# Patient Record
Sex: FEMALE | Race: WHITE | Employment: UNEMPLOYED | URBAN - METROPOLITAN AREA
[De-identification: names, ages, dates, MRNs, and addresses within clinical notes are randomized per-mention and may not be internally consistent; named-entity substitution may affect disease eponyms.]

---

## 2017-01-11 ENCOUNTER — HOSPITAL ENCOUNTER (OUTPATIENT)
Dept: NUCLEAR MEDICINE | Age: 37
Discharge: HOME | End: 2017-01-11
Attending: EMERGENCY MEDICINE | Admitting: EMERGENCY MEDICINE

## 2017-01-11 DIAGNOSIS — M79.604 PAIN OF RIGHT LEG: ICD-10-CM

## 2017-01-11 DIAGNOSIS — M84.376G: ICD-10-CM

## 2017-01-11 RX ORDER — TC 99M MEDRONATE 20 MG/10ML
25 INJECTION, POWDER, LYOPHILIZED, FOR SOLUTION INTRAVENOUS
Status: DISCONTINUED | OUTPATIENT
Start: 2017-01-11 | End: 2017-01-12 | Stop reason: HOSPADM

## 2018-02-09 ENCOUNTER — HOSPITAL ENCOUNTER (OUTPATIENT)
Dept: NUCLEAR MEDICINE | Age: 38
Discharge: HOME | End: 2018-02-09
Admitting: EMERGENCY MEDICINE

## 2018-02-09 DIAGNOSIS — M25.50 ARTHRALGIA, UNSPECIFIED JOINT: ICD-10-CM

## 2018-02-09 DIAGNOSIS — M79.601 PAIN OF RIGHT ARM: ICD-10-CM

## 2018-02-09 RX ORDER — TC 99M MEDRONATE 20 MG/10ML
25 INJECTION, POWDER, LYOPHILIZED, FOR SOLUTION INTRAVENOUS
Status: COMPLETED | OUTPATIENT
Start: 2018-02-09 | End: 2018-02-09

## 2018-02-09 RX ADMIN — TC 99M MEDRONATE 25 MILLICURIE: 20 INJECTION, POWDER, LYOPHILIZED, FOR SOLUTION INTRAVENOUS at 09:00

## 2018-05-10 ENCOUNTER — HOSPITAL ENCOUNTER (OUTPATIENT)
Dept: NEUROLOGY | Age: 38
Discharge: HOME | End: 2018-05-10
Attending: INTERNAL MEDICINE | Admitting: INTERNAL MEDICINE

## 2018-05-10 DIAGNOSIS — G90.09 OTHER IDIOPATHIC PERIPHERAL AUTONOMIC NEUROPATHY: ICD-10-CM

## 2018-09-12 ENCOUNTER — TELEPHONE (OUTPATIENT)
Age: 38
End: 2018-09-12

## 2018-09-13 NOTE — TELEPHONE ENCOUNTER
I have talked with Dr. Norm Doran but don't remember the details of the conversation. If he wants her seen here, we should get a formal referral from his office.  Suggest she call his office to request a referral.

## 2018-09-13 NOTE — TELEPHONE ENCOUNTER
Spoke with the patient related to New patient appointment.  Per Dr. Meka Mendenhall, please have Dr. Leny Jimenez send over a referral. Patient acknowledged

## 2018-09-25 PROBLEM — Z94.0 KIDNEY REPLACED BY TRANSPLANT: Status: ACTIVE | Noted: 2018-09-25

## 2018-09-25 RX ORDER — SODIUM CHLORIDE 0.9 % (FLUSH) 0.9 %
10 SYRINGE (ML) INJECTION PRN
Status: CANCELLED
Start: 2018-09-26

## 2018-09-26 ENCOUNTER — HOSPITAL ENCOUNTER (OUTPATIENT)
Dept: INFUSION THERAPY | Age: 38
Setting detail: INFUSION SERIES
Discharge: HOME | End: 2018-09-26
Payer: MEDICARE

## 2018-09-26 VITALS
RESPIRATION RATE: 17 BRPM | TEMPERATURE: 98.7 F | DIASTOLIC BLOOD PRESSURE: 74 MMHG | BODY MASS INDEX: 37.71 KG/M2 | WEIGHT: 220.9 LBS | HEART RATE: 81 BPM | OXYGEN SATURATION: 99 % | HEIGHT: 64 IN | SYSTOLIC BLOOD PRESSURE: 120 MMHG

## 2018-09-26 DIAGNOSIS — Z94.0 KIDNEY REPLACED BY TRANSPLANT: ICD-10-CM

## 2018-09-26 PROCEDURE — 2580000003 HC RX 258: Performed by: INTERNAL MEDICINE

## 2018-09-26 PROCEDURE — 96365 THER/PROPH/DIAG IV INF INIT: CPT

## 2018-09-26 PROCEDURE — 6360000002 HC RX W HCPCS: Performed by: INTERNAL MEDICINE

## 2018-09-26 RX ORDER — LORATADINE 10 MG/1
10 TABLET ORAL DAILY
COMMUNITY
End: 2019-06-11

## 2018-09-26 RX ORDER — SODIUM CHLORIDE 0.9 % (FLUSH) 0.9 %
10 SYRINGE (ML) INJECTION PRN
Status: CANCELLED
Start: 2018-09-26

## 2018-09-26 RX ORDER — MYCOPHENOLATE MOFETIL 250 MG/1
250 CAPSULE ORAL 2 TIMES DAILY
COMMUNITY

## 2018-09-26 RX ORDER — CITALOPRAM 20 MG/1
20 TABLET ORAL EVERY EVENING
COMMUNITY
End: 2020-05-21 | Stop reason: ALTCHOICE

## 2018-09-26 RX ADMIN — DEXTROSE MONOHYDRATE 500 MG: 50 INJECTION, SOLUTION INTRAVENOUS at 15:02

## 2018-09-26 NOTE — PROGRESS NOTES
Outpatient [id number] [facility]    Belatacept Visit    NAME:  Junior Matos OF BIRTH:  1980  MEDICAL RECORD NUMBER:  [id number]  DATE:  9/26/2018    Patient arrived to [facility] 58   [] per wheelchair   [x] ambulatory       Diagnosis: kidney transplant/ was getting this infusion at [facility].   Patient Active Problem List   Diagnosis    Kidney replaced by transplant     Allergies   Allergen Reactions    Dilaudid [Hydromorphone Hcl]     Morphine     Zithromax [Azithromycin]      Patient needs to be seropositive to EBV prior to Belatacept Infusion. Patient is EBV seropositive? Unable to find in documentation. Has patient had TB skin test recently? No / unknown per pt. Has patient had any vaccines given recently?  no  Has patient been instructed to avoid any live vaccine administrations, such as but not limited to : intranasal influenza, measles, mumps, rubella, oral polio, BCG, yellow fever, varicella, and TY21 typhoid vaccines? yes  Patient is aware that Belatacept is an immunosuppressant therapy and can increase risk of developing  infections and possibly other  Malignancies? Yes    checklist       Patient given a copy of the medication guide:  No:        Patient counseled about increased risk for:    Post-Transplant Lymphoproliferative Disorder (PTLD), predominantly involving the  CNS:  Yes                                                  Increased risk of Progressive Multifocal Leukoencephalopathy (PML), a CNS Infection:  Yes                                                                                                 Prior to infusion, these questions were read aloud to patient:  Over the past month, have you had any new or worsening medical problems such as a new or sudden change in your thinking, memory, speech, mood, behavior, vision, balance, strength, or other problems?   No                      Over the past month, have you had any new or worsening symptoms such as fever, night sweats, tiredness that does not go away, weight loss or swollen glands? YES nightly with 3 fans. Patient reminded to immediately report any new or worsening medical problems such as:  A new or sudden change in thinking, memory, speech, mood, behavior, vision, balance,strength. Yes                                Fever, night sweats, tiredness that does not go away, weight loss, or swollen glands. Yes   Night sweats that the MD is aware of. Patient given Belatacept  mg IVPB over 30 min IVPB using a .22 micron filter per protocol. Response to treatment:  Well tolerated by patient. Education:    Indicates understanding  To return 10/23/18 at 1415.  hemodiaylsis cath in left ACF site clean and unremarkable. Not in use at this time.   Electronically signed by Good Mariscal RN on 9/26/2018 at 3:57 PM

## 2018-10-23 ENCOUNTER — HOSPITAL ENCOUNTER (OUTPATIENT)
Dept: INFUSION THERAPY | Age: 38
Setting detail: INFUSION SERIES
Discharge: HOME | End: 2018-10-23
Payer: MEDICARE

## 2018-10-23 VITALS
DIASTOLIC BLOOD PRESSURE: 99 MMHG | TEMPERATURE: 98.9 F | HEART RATE: 77 BPM | RESPIRATION RATE: 17 BRPM | SYSTOLIC BLOOD PRESSURE: 144 MMHG | BODY MASS INDEX: 37.08 KG/M2 | WEIGHT: 216 LBS

## 2018-10-23 DIAGNOSIS — Z94.0 KIDNEY REPLACED BY TRANSPLANT: ICD-10-CM

## 2018-10-23 PROCEDURE — 6360000002 HC RX W HCPCS: Performed by: INTERNAL MEDICINE

## 2018-10-23 PROCEDURE — 2580000003 HC RX 258: Performed by: INTERNAL MEDICINE

## 2018-10-23 PROCEDURE — 96365 THER/PROPH/DIAG IV INF INIT: CPT

## 2018-10-23 RX ORDER — ACETAMINOPHEN 325 MG/1
650 TABLET ORAL EVERY 6 HOURS PRN
COMMUNITY

## 2018-10-23 RX ORDER — SODIUM CHLORIDE 0.9 % (FLUSH) 0.9 %
10 SYRINGE (ML) INJECTION PRN
Status: DISCONTINUED | OUTPATIENT
Start: 2018-10-23 | End: 2018-10-24 | Stop reason: HOSPADM

## 2018-10-23 RX ORDER — SODIUM CHLORIDE 0.9 % (FLUSH) 0.9 %
10 SYRINGE (ML) INJECTION PRN
Status: CANCELLED
Start: 2018-10-24

## 2018-10-23 RX ADMIN — Medication 20 ML: at 14:17

## 2018-10-23 RX ADMIN — DEXTROSE MONOHYDRATE 500 MG: 50 INJECTION, SOLUTION INTRAVENOUS at 15:29

## 2018-10-23 NOTE — PROGRESS NOTES
Outpatient [id number] [facility]    Belatacept Visit    NAME:  Ellen Ridley OF BIRTH:  1980  MEDICAL RECORD NUMBER:  [id number]  DATE:  10/23/2018    Patient arrived to [facility] 58   [] per wheelchair   [x] ambulatory       Diagnosis: kidney transplant    Patient Active Problem List   Diagnosis    Kidney replaced by transplant     Allergies   Allergen Reactions    Dilaudid [Hydromorphone Hcl]     Morphine     Zithromax [Azithromycin]        Patient needs to be seropositive to EBV prior to Belatacept Infusion. Patient is EBV seropositive? Yes    Has patient had TB skin test recently? July 2016  Has patient had any vaccines given recently? Yes July 2016    Has patient been instructed to avoid any live vaccine administrations, such as but not limited to : intranasal influenza, measles, mumps, rubella, oral polio, BCG, yellow fever, varicella, and TY21 typhoid vaccines? Yes    Patient is aware that Belatacept is an immunosuppressant therapy and can increase risk of developing  infections and possibly other  Malignancies? Yes     Wt 216 lb (98 kg)   BMI 37.08 kg/m²     Pre Infusion Checklist       Patient given a copy of the medication guide:  Yes       Patient counseled about increased risk for:    Post-Transplant Lymphoproliferative Disorder (PTLD), predominantly involving             the  CNS:  yes                                                  Increased risk of Progressive Multifocal Leukoencephalopathy (PML), a CNS                    Infection:  yes                                                                                                   Prior to infusion, these questions were read aloud to patient:  Over the past month, have you had any new or worsening medical problems such as a new or sudden change in your thinking, memory, speech, mood, behavior, vision, balance, strength, or other problems? NO.                          Over the past month, have you had any new or worsening symptoms such as fever, night sweats, tiredness that does not go away, weight loss or swollen glands? NO. Patient reminded to immediately report any new or worsening medical problems such as:  A new or sudden change in thinking, memory, speech, mood, behavior, vision, balance,strength. yes                                 Fever, night sweats, tiredness that does not go away, weight loss, or swollen glands. yes                                                          Patient given Belatacept 500 mg IVPB over 30 min IVPB using a .22 micron filter per protocol. Response to treatment:  Well tolerated by patient.     Education:    Indicates understanding    Electronically signed by Shahriar Louise RN on 10/23/2018 at 6:17 PM

## 2018-10-25 ENCOUNTER — HOSPITAL ENCOUNTER (OUTPATIENT)
Dept: NUCLEAR MEDICINE | Age: 38
Discharge: HOME | End: 2018-10-25
Payer: MEDICARE

## 2018-10-25 DIAGNOSIS — M89.8X6 PAIN IN LEFT TIBIA: ICD-10-CM

## 2018-10-25 DIAGNOSIS — M79.671 PAIN OF RIGHT HEEL: ICD-10-CM

## 2018-10-25 PROCEDURE — A9503 TC99M MEDRONATE: HCPCS | Performed by: EMERGENCY MEDICINE

## 2018-10-25 PROCEDURE — 3430000000 HC RX DIAGNOSTIC RADIOPHARMACEUTICAL: Performed by: EMERGENCY MEDICINE

## 2018-10-25 PROCEDURE — 78315 BONE IMAGING 3 PHASE: CPT

## 2018-10-25 RX ORDER — TC 99M MEDRONATE 20 MG/10ML
25 INJECTION, POWDER, LYOPHILIZED, FOR SOLUTION INTRAVENOUS
Status: COMPLETED | OUTPATIENT
Start: 2018-10-25 | End: 2018-10-25

## 2018-10-25 RX ADMIN — Medication 25 MILLICURIE: at 09:18

## 2018-11-01 ENCOUNTER — TELEPHONE (OUTPATIENT)
Dept: ENDOCRINOLOGY | Age: 38
End: 2018-11-01

## 2018-11-02 NOTE — TELEPHONE ENCOUNTER
See telephone encounter from 09/12/2018. As far as I know, we have not received a referral from Dr. Rosalie Flores. You can go ahead and send her the Health History Form, but have her call Dr. Angelic Michel office to have them do the referral. Thanks.

## 2018-11-16 ENCOUNTER — TELEPHONE (OUTPATIENT)
Dept: ENDOCRINOLOGY | Age: 38
End: 2018-11-16

## 2018-11-20 ENCOUNTER — HOSPITAL ENCOUNTER (OUTPATIENT)
Dept: INFUSION THERAPY | Age: 38
Setting detail: INFUSION SERIES
Discharge: HOME | End: 2018-11-20
Payer: MEDICARE

## 2018-11-20 VITALS
TEMPERATURE: 98.8 F | OXYGEN SATURATION: 98 % | HEART RATE: 77 BPM | SYSTOLIC BLOOD PRESSURE: 119 MMHG | DIASTOLIC BLOOD PRESSURE: 82 MMHG | RESPIRATION RATE: 16 BRPM

## 2018-11-20 DIAGNOSIS — Z94.0 KIDNEY REPLACED BY TRANSPLANT: ICD-10-CM

## 2018-11-20 PROCEDURE — 6360000002 HC RX W HCPCS: Performed by: INTERNAL MEDICINE

## 2018-11-20 PROCEDURE — 96372 THER/PROPH/DIAG INJ SC/IM: CPT

## 2018-11-20 PROCEDURE — 2580000003 HC RX 258: Performed by: INTERNAL MEDICINE

## 2018-11-20 RX ORDER — SODIUM CHLORIDE 0.9 % (FLUSH) 0.9 %
10 SYRINGE (ML) INJECTION PRN
Status: CANCELLED
Start: 2018-11-21

## 2018-11-20 RX ORDER — SODIUM CHLORIDE 0.9 % (FLUSH) 0.9 %
10 SYRINGE (ML) INJECTION PRN
Status: DISCONTINUED | OUTPATIENT
Start: 2018-11-20 | End: 2018-11-21 | Stop reason: HOSPADM

## 2018-11-20 RX ADMIN — DEXTROSE MONOHYDRATE 500 MG: 50 INJECTION, SOLUTION INTRAVENOUS at 16:04

## 2018-11-20 NOTE — PROGRESS NOTES
Outpatient [id number] [facility]    Belatacept Visit    NAME:  Landy Tamayo OF BIRTH:  1980  MEDICAL RECORD NUMBER:  [id number]  DATE:  11/20/2018    Patient arrived to [facility] 58   [] per wheelchair   [x] ambulatory     Alert and oriented X4, denies any side effects from last infusion. C/O intermittent bone pain related to chronic disease, last visit in boot states xrays determined tibial fx bilaterally, boot discontinued, no further treatment needed. Diagnosis: kidney transplant    Patient Active Problem List   Diagnosis    Kidney replaced by transplant     Allergies   Allergen Reactions    Dilaudid [Hydromorphone Hcl]     Morphine     Zithromax [Azithromycin]        Patient needs to be seropositive to EBV prior to Belatacept Infusion. Patient is EBV seropositive? Yes    Has patient had TB skin test recently? July 2016    Has patient had any vaccines given recently? No    Has patient been instructed to avoid any live vaccine administrations, such as but not limited to : intranasal influenza, measles, mumps, rubella, oral polio, BCG, yellow fever, varicella, and TY21 typhoid vaccines? Yes    Patient is aware that Belatacept is an immunosuppressant therapy and can increase risk of developing  infections and possibly other  Malignancies?   Yes     /82   Pulse 77   Temp 98.8 °F (37.1 °C) (Oral)   Resp 16   SpO2 98%     Pre Infusion Checklist       Patient given a copy of the medication guide:  Yes       Patient counseled about increased risk for:    Post-Transplant Lymphoproliferative Disorder (PTLD), predominantly involving             the  CNS:  Yes                                                  Increased risk of Progressive Multifocal Leukoencephalopathy (PML), a CNS                    Infection:  Yes                                                                                                   Prior to infusion, these questions were read aloud to patient:  Over the past month, have you had any new or worsening medical problems such as a new or sudden change in your thinking, memory, speech, mood, behavior, vision, balance, strength, or other problems? No                         Over the past month, have you had any new or worsening symptoms such as fever, night sweats, tiredness that does not go away, weight loss or swollen glands? No                   Patient reminded to immediately report any new or worsening medical problems such as:  A new or sudden change in thinking, memory, speech, mood, behavior, vision, balance,strength. Yes                                Fever, night sweats, tiredness that does not go away, weight loss, or swollen glands. Yes                                                             Patient given Belatacept 500 mg IVPB over 30 min IVPB using a .22 micron filter per protocol. Response to treatment:  Well tolerated by patient.     Education:    Verbalized understanding       Electronically signed by Louisa Yang RN on 11/20/2018 at 3:09 PM

## 2018-11-27 NOTE — TELEPHONE ENCOUNTER
Left message for the patient to call the office related to  Schedule new patient appointment in December 2018

## 2018-11-28 PROBLEM — E83.52 HYPERCALCEMIA: Status: ACTIVE | Noted: 2018-11-28

## 2018-11-28 PROBLEM — R79.89 HIGH SERUM PARATHYROID HORMONE (PTH): Status: ACTIVE | Noted: 2018-11-28

## 2018-12-03 ENCOUNTER — OFFICE VISIT (OUTPATIENT)
Dept: ENDOCRINOLOGY | Age: 38
End: 2018-12-03
Payer: MEDICARE

## 2018-12-03 VITALS
HEIGHT: 63 IN | WEIGHT: 223 LBS | SYSTOLIC BLOOD PRESSURE: 122 MMHG | DIASTOLIC BLOOD PRESSURE: 73 MMHG | BODY MASS INDEX: 39.51 KG/M2

## 2018-12-03 DIAGNOSIS — M84.471A: ICD-10-CM

## 2018-12-03 DIAGNOSIS — Z94.0 KIDNEY REPLACED BY TRANSPLANT: Primary | ICD-10-CM

## 2018-12-03 DIAGNOSIS — M84.373D: ICD-10-CM

## 2018-12-03 DIAGNOSIS — E83.52 HYPERCALCEMIA: ICD-10-CM

## 2018-12-03 DIAGNOSIS — R79.89 HIGH SERUM PARATHYROID HORMONE (PTH): ICD-10-CM

## 2018-12-03 DIAGNOSIS — S92.901D FOOT FRACTURE, RIGHT, WITH ROUTINE HEALING, SUBSEQUENT ENCOUNTER: ICD-10-CM

## 2018-12-03 PROCEDURE — 1036F TOBACCO NON-USER: CPT | Performed by: INTERNAL MEDICINE

## 2018-12-03 PROCEDURE — G8417 CALC BMI ABV UP PARAM F/U: HCPCS | Performed by: INTERNAL MEDICINE

## 2018-12-03 PROCEDURE — 99205 OFFICE O/P NEW HI 60 MIN: CPT | Performed by: INTERNAL MEDICINE

## 2018-12-03 PROCEDURE — G8484 FLU IMMUNIZE NO ADMIN: HCPCS | Performed by: INTERNAL MEDICINE

## 2018-12-03 PROCEDURE — G8427 DOCREV CUR MEDS BY ELIG CLIN: HCPCS | Performed by: INTERNAL MEDICINE

## 2018-12-13 LAB
ALBUMIN SERPL-MCNC: 4.5 G/DL (ref 3.5–5)
CALCIUM SERPL-MCNC: 9.7 MG/DL (ref 8.4–10.2)
PHOSPHORUS: 2.4 MG/DL (ref 2.7–4.5)
VITAMIN D 25-HYDROXY: 34 NG/ML

## 2018-12-14 LAB — PARATHYROID HORMONE INTACT: 72.41 PG/ML (ref 15–65)

## 2018-12-16 LAB — ALK PHOS BONE SPECIFIC: 13.3 UG/L

## 2018-12-18 ENCOUNTER — APPOINTMENT (OUTPATIENT)
Dept: INFUSION THERAPY | Age: 38
End: 2018-12-18
Payer: MEDICARE

## 2018-12-18 LAB — COLLAGEN CROSSLINKED C-TELOPEPTIDE: 248 PG/ML (ref 60–650)

## 2018-12-19 ENCOUNTER — HOSPITAL ENCOUNTER (OUTPATIENT)
Dept: INFUSION THERAPY | Age: 38
Setting detail: INFUSION SERIES
Discharge: HOME | End: 2018-12-19
Payer: MEDICARE

## 2018-12-19 VITALS
DIASTOLIC BLOOD PRESSURE: 87 MMHG | SYSTOLIC BLOOD PRESSURE: 135 MMHG | OXYGEN SATURATION: 100 % | TEMPERATURE: 98.2 F | HEART RATE: 73 BPM | RESPIRATION RATE: 16 BRPM

## 2018-12-19 DIAGNOSIS — Z94.0 KIDNEY REPLACED BY TRANSPLANT: ICD-10-CM

## 2018-12-19 DIAGNOSIS — E83.39 HYPOPHOSPHATEMIA: Primary | ICD-10-CM

## 2018-12-19 DIAGNOSIS — R79.89 HIGH SERUM PARATHYROID HORMONE (PTH): ICD-10-CM

## 2018-12-19 PROCEDURE — 6360000002 HC RX W HCPCS: Performed by: INTERNAL MEDICINE

## 2018-12-19 PROCEDURE — 2580000003 HC RX 258: Performed by: INTERNAL MEDICINE

## 2018-12-19 PROCEDURE — 96365 THER/PROPH/DIAG IV INF INIT: CPT

## 2018-12-19 RX ORDER — SODIUM CHLORIDE 0.9 % (FLUSH) 0.9 %
10 SYRINGE (ML) INJECTION PRN
Status: DISCONTINUED | OUTPATIENT
Start: 2018-12-19 | End: 2018-12-20 | Stop reason: HOSPADM

## 2018-12-19 RX ORDER — SODIUM CHLORIDE 0.9 % (FLUSH) 0.9 %
10 SYRINGE (ML) INJECTION PRN
Status: CANCELLED
Start: 2018-12-19

## 2018-12-19 RX ORDER — ATORVASTATIN CALCIUM 20 MG/1
20 TABLET, FILM COATED ORAL DAILY
COMMUNITY
End: 2019-02-15 | Stop reason: ALTCHOICE

## 2018-12-19 RX ADMIN — DEXTROSE MONOHYDRATE 500 MG: 50 INJECTION, SOLUTION INTRAVENOUS at 12:42

## 2018-12-19 RX ADMIN — Medication 10 ML: at 11:03

## 2018-12-19 RX ADMIN — Medication 20 ML: at 13:17

## 2018-12-19 NOTE — PROGRESS NOTES
Outpatient [id number] [facility]    Belatacept Visit    NAME:  Michel Zaman OF BIRTH:  1980  MEDICAL RECORD NUMBER:  [id number]    Diagnosis :  Kidney transplant patient. Patient needs to be seropositive to EBV prior to Belatacept Infusion. Patient is EBV seropositive? Yes. Has patient had TB skin test recently? YES / sept. 2016    Has patient had any vaccines given recently? No / flu was in October    Has patient been instructed to avoid any live vaccine administrations, such as but not limited to : intranasal influenza, measles, mumps, rubella, oral polio, BCG, yellow fever, varicella, and TY21 typhoid vaccines? Yes. Patient is aware that Belatacept is an immunosuppressant therapy and can increase risk of developing  infections and possibly other  Malignancies? Yes      Pre Infusion Checklist       Patient given a copy of the medication guide:  No: not at this visit. Patient counseled about increased risk for:    Post-Transplant Lymphoproliferative Disorder (PTLD), predominantly involving             the  CNS:  Yes                                                  Increased risk of Progressive Multifocal Leukoencephalopathy (PML), a CNS                    Infection:  Yes                                                                                                   Prior to infusion, these questions were read aloud to patient:  Over the past month, have you had any new or worsening medical problems such as a new or sudden change in your thinking, memory, speech, mood, behavior, vision, balance, strength, or other problems? No                         Over the past month, have you had any new or worsening symptoms such as fever, night sweats, tiredness that does not go away, weight loss or swollen glands?   No                   Patient reminded to immediately report any new or worsening medical problems such as:  A new or sudden change in thinking, memory, speech,

## 2019-01-15 ENCOUNTER — HOSPITAL ENCOUNTER (OUTPATIENT)
Dept: INFUSION THERAPY | Age: 39
Setting detail: INFUSION SERIES
End: 2019-01-15
Payer: MEDICARE

## 2019-01-15 LAB — PHOSPHORUS: 3.7 MG/DL (ref 2.7–4.5)

## 2019-01-16 ENCOUNTER — HOSPITAL ENCOUNTER (OUTPATIENT)
Dept: INFUSION THERAPY | Age: 39
Setting detail: INFUSION SERIES
Discharge: HOME | End: 2019-01-16
Payer: MEDICARE

## 2019-01-16 VITALS
WEIGHT: 223.99 LBS | DIASTOLIC BLOOD PRESSURE: 74 MMHG | OXYGEN SATURATION: 99 % | RESPIRATION RATE: 17 BRPM | BODY MASS INDEX: 39.39 KG/M2 | HEART RATE: 78 BPM | SYSTOLIC BLOOD PRESSURE: 133 MMHG | TEMPERATURE: 97.9 F

## 2019-01-16 DIAGNOSIS — Z94.0 KIDNEY REPLACED BY TRANSPLANT: ICD-10-CM

## 2019-01-16 LAB — PARATHYROID HORMONE INTACT: 105.8 PG/ML (ref 15–65)

## 2019-01-16 PROCEDURE — 96365 THER/PROPH/DIAG IV INF INIT: CPT

## 2019-01-16 PROCEDURE — 6360000002 HC RX W HCPCS: Performed by: INTERNAL MEDICINE

## 2019-01-16 PROCEDURE — 2580000003 HC RX 258: Performed by: INTERNAL MEDICINE

## 2019-01-16 RX ORDER — ROSUVASTATIN CALCIUM 10 MG/1
10 TABLET, COATED ORAL NIGHTLY
COMMUNITY
End: 2019-03-18 | Stop reason: SINTOL

## 2019-01-16 RX ORDER — SODIUM CHLORIDE 0.9 % (FLUSH) 0.9 %
10 SYRINGE (ML) INJECTION PRN
Status: CANCELLED
Start: 2019-01-16

## 2019-01-16 RX ADMIN — DEXTROSE MONOHYDRATE 500 MG: 50 INJECTION, SOLUTION INTRAVENOUS at 15:04

## 2019-01-18 DIAGNOSIS — R79.89 HIGH SERUM PARATHYROID HORMONE (PTH): Primary | ICD-10-CM

## 2019-01-18 DIAGNOSIS — E83.39 HYPOPHOSPHATEMIA: ICD-10-CM

## 2019-02-15 ENCOUNTER — HOSPITAL ENCOUNTER (OUTPATIENT)
Dept: INFUSION THERAPY | Age: 39
Setting detail: INFUSION SERIES
Discharge: HOME | End: 2019-02-15
Payer: MEDICARE

## 2019-02-15 VITALS
HEART RATE: 91 BPM | TEMPERATURE: 98.7 F | DIASTOLIC BLOOD PRESSURE: 77 MMHG | RESPIRATION RATE: 16 BRPM | SYSTOLIC BLOOD PRESSURE: 143 MMHG | OXYGEN SATURATION: 97 %

## 2019-02-15 DIAGNOSIS — Z94.0 KIDNEY REPLACED BY TRANSPLANT: ICD-10-CM

## 2019-02-15 PROCEDURE — 2580000003 HC RX 258: Performed by: INTERNAL MEDICINE

## 2019-02-15 PROCEDURE — 6360000002 HC RX W HCPCS: Performed by: INTERNAL MEDICINE

## 2019-02-15 PROCEDURE — 96365 THER/PROPH/DIAG IV INF INIT: CPT

## 2019-02-15 RX ORDER — SODIUM CHLORIDE 0.9 % (FLUSH) 0.9 %
10 SYRINGE (ML) INJECTION PRN
Status: DISCONTINUED | OUTPATIENT
Start: 2019-02-15 | End: 2019-02-16 | Stop reason: HOSPADM

## 2019-02-15 RX ORDER — SODIUM CHLORIDE 0.9 % (FLUSH) 0.9 %
10 SYRINGE (ML) INJECTION PRN
Status: CANCELLED
Start: 2019-02-15

## 2019-02-15 RX ADMIN — DEXTROSE MONOHYDRATE 500 MG: 50 INJECTION, SOLUTION INTRAVENOUS at 09:42

## 2019-02-15 NOTE — PROGRESS NOTES
Outpatient [id number] [facility]    Belatacept Visit    NAME:  Carolynn Briscoe OF BIRTH:  1980  MEDICAL RECORD NUMBER:  [id number]  DATE:  2/15/2019    Patient arrived to [facility] 58   [] per wheelchair   [x] ambulatory     Alert and oriented X4. Denies any new  side effects from last infusion. States always feels tired the day of infusion. Diagnosis: Kidney transplant    Patient Active Problem List   Diagnosis    Kidney replaced by transplant    Hypercalcemia    High serum parathyroid hormone (PTH)    Hypophosphatemia     Allergies   Allergen Reactions    Dilaudid [Hydromorphone Hcl]     Morphine     Zithromax [Azithromycin]        Patient needs to be seropositive to EBV prior to Belatacept Infusion. Patient is EBV seropositive? Yes    Has patient had TB skin test recently? No    Has patient had any vaccines given recently? No    Has patient been instructed to avoid any live vaccine administrations, such as but not limited to : intranasal influenza, measles, mumps, rubella, oral polio, BCG, yellow fever, varicella, and TY21 typhoid vaccines? Yes    Patient is aware that Belatacept is an immunosuppressant therapy and can increase risk of developing  infections and possibly other  Malignancies? Yes, States had the flu a few weeks ago and treated with antibiotics and tamiflu but none now.                                                             BP (!) 143/77   Pulse 91   Temp 98.7 °F (37.1 °C) (Oral)   Resp 16   SpO2 97%     Pre Infusion Checklist       Patient given a copy of the medication guide:  Yes       Patient counseled about increased risk for:    Post-Transplant Lymphoproliferative Disorder (PTLD), predominantly involving             the  CNS:  Yes                                                  Increased risk of Progressive Multifocal Leukoencephalopathy (PML), a CNS                    Infection:  Yes Prior to infusion, these questions were read aloud to patient:  Over the past month, have you had any new or worsening medical problems such as a new or sudden change in your thinking, memory, speech, mood, behavior, vision, balance, strength, or other problems? No                         Over the past month, have you had any new or worsening symptoms such as fever, night sweats, tiredness that does not go away, weight loss or swollen glands? No                   Patient reminded to immediately report any new or worsening medical problems such as:  A new or sudden change in thinking, memory, speech, mood, behavior, vision, balance,strength. Yes                                Fever, night sweats, tiredness that does not go away, weight loss, or swollen glands. NO (states has had general hot flashes ever since receiving the kidney transplant    Patient given Belatacept 500 mg IVPB over 30 min IVPB using a .22 micron filter per protocol. Response to treatment:  Well tolerated by patient.     Education:    Verbalized understanding       Electronically signed by Louisa Yang RN on 2/15/2019 at 10:37 AM

## 2019-03-18 ENCOUNTER — HOSPITAL ENCOUNTER (OUTPATIENT)
Dept: INFUSION THERAPY | Age: 39
Setting detail: INFUSION SERIES
Discharge: HOME | End: 2019-03-18
Payer: MEDICARE

## 2019-03-18 VITALS
DIASTOLIC BLOOD PRESSURE: 81 MMHG | WEIGHT: 224.6 LBS | BODY MASS INDEX: 39.5 KG/M2 | RESPIRATION RATE: 18 BRPM | TEMPERATURE: 98.9 F | SYSTOLIC BLOOD PRESSURE: 132 MMHG | HEART RATE: 81 BPM

## 2019-03-18 DIAGNOSIS — Z94.0 KIDNEY REPLACED BY TRANSPLANT: Primary | ICD-10-CM

## 2019-03-18 PROCEDURE — 96365 THER/PROPH/DIAG IV INF INIT: CPT

## 2019-03-18 PROCEDURE — 6360000002 HC RX W HCPCS: Performed by: INTERNAL MEDICINE

## 2019-03-18 PROCEDURE — 2580000003 HC RX 258: Performed by: INTERNAL MEDICINE

## 2019-03-18 RX ORDER — SODIUM CHLORIDE 0.9 % (FLUSH) 0.9 %
10 SYRINGE (ML) INJECTION PRN
Status: CANCELLED
Start: 2019-03-18

## 2019-03-18 RX ORDER — SODIUM CHLORIDE 0.9 % (FLUSH) 0.9 %
10 SYRINGE (ML) INJECTION PRN
Status: DISCONTINUED | OUTPATIENT
Start: 2019-03-18 | End: 2019-03-19 | Stop reason: HOSPADM

## 2019-03-18 RX ADMIN — DEXTROSE MONOHYDRATE 500 MG: 50 INJECTION, SOLUTION INTRAVENOUS at 14:28

## 2019-03-18 NOTE — PROGRESS NOTES
Outpatient [id number] [facility]    Belatacept Visit    NAME:  Kena Roque OF BIRTH:  1980  MEDICAL RECORD NUMBER:  [id number]  DATE:  3/18/2019    Patient arrived to [facility] 58   [] per wheelchair   [x] ambulatory     Patient here for belatacept infusion. Has had a chest cold bronchitis for about a week. Started on Amoxicillin 3 days ago. Still has a cough but denies any fever or chilling. Called Dr. Alix Kruse directly and spoke to him about bronchitis and being on abx. Dr. Alix Kruse wants patient to have the belatacept today. Diagnosis: Kidney replaced by transplant    Patient Active Problem List   Diagnosis    Kidney replaced by transplant    Hypercalcemia    High serum parathyroid hormone (PTH)    Hypophosphatemia     Allergies   Allergen Reactions    Dilaudid [Hydromorphone Hcl]     Morphine     Zithromax [Azithromycin]        Patient needs to be seropositive to EBV prior to Belatacept Infusion. Patient is EBV seropositive? Yes    Has patient had TB skin test recently? No    Has patient had any vaccines given recently? No , last vaccine was flu shot in oct 2018    Has patient been instructed to avoid any live vaccine administrations, such as but not limited to : intranasal influenza, measles, mumps, rubella, oral polio, BCG, yellow fever, varicella, and TY21 typhoid vaccines? Yes    Patient is aware that Belatacept is an immunosuppressant therapy and can increase risk of developing  infections and possibly other  Malignancies?   Yes     /81   Pulse 81   Temp 98.9 °F (37.2 °C) (Oral)   Resp 18     Pre Infusion Checklist       Patient given a copy of the medication guide:  Yes       Patient counseled about increased risk for:    Post-Transplant Lymphoproliferative Disorder (PTLD), predominantly involving             the  CNS:  Yes                                                  Increased risk of Progressive Multifocal Leukoencephalopathy (PML), a CNS                    Infection:  Yes                                                                                                   Prior to infusion, these questions were read aloud to patient:  Over the past month, have you had any new or worsening medical problems such as a new or sudden change in your thinking, memory, speech, mood, behavior, vision, balance, strength, or other problems? No                         Over the past month, have you had any new or worsening symptoms such as fever, night sweats, tiredness that does not go away, weight loss or swollen glands? No                   Patient reminded to immediately report any new or worsening medical problems such as:  A new or sudden change in thinking, memory, speech, mood, behavior, vision, balance,strength. Yes                                Fever, night sweats, tiredness that does not go away, weight loss, or swollen glands. Yes                                                             Patient given Belatacept 500 mg IVPB over 30 min IVPB  per protocol. Response to treatment:  Well tolerated by patient.     Education:    Indicates understanding       Electronically signed by Melissa Sewell RN on 3/18/2019 at 3:25 PM

## 2019-04-17 ENCOUNTER — HOSPITAL ENCOUNTER (OUTPATIENT)
Dept: INFUSION THERAPY | Age: 39
Setting detail: INFUSION SERIES
Discharge: HOME | End: 2019-04-17
Payer: MEDICARE

## 2019-04-17 VITALS
WEIGHT: 224 LBS | RESPIRATION RATE: 18 BRPM | SYSTOLIC BLOOD PRESSURE: 137 MMHG | OXYGEN SATURATION: 97 % | DIASTOLIC BLOOD PRESSURE: 84 MMHG | TEMPERATURE: 98.5 F | BODY MASS INDEX: 39.39 KG/M2 | HEART RATE: 85 BPM

## 2019-04-17 DIAGNOSIS — Z94.0 KIDNEY REPLACED BY TRANSPLANT: Primary | ICD-10-CM

## 2019-04-17 PROCEDURE — 2580000003 HC RX 258: Performed by: INTERNAL MEDICINE

## 2019-04-17 PROCEDURE — 96365 THER/PROPH/DIAG IV INF INIT: CPT

## 2019-04-17 PROCEDURE — 6360000002 HC RX W HCPCS: Performed by: INTERNAL MEDICINE

## 2019-04-17 RX ORDER — SODIUM CHLORIDE 0.9 % (FLUSH) 0.9 %
10 SYRINGE (ML) INJECTION PRN
Status: DISCONTINUED | OUTPATIENT
Start: 2019-04-17 | End: 2019-04-18 | Stop reason: HOSPADM

## 2019-04-17 RX ORDER — SODIUM CHLORIDE 0.9 % (FLUSH) 0.9 %
10 SYRINGE (ML) INJECTION PRN
Status: CANCELLED
Start: 2019-05-15

## 2019-04-17 RX ADMIN — DEXTROSE MONOHYDRATE 500 MG: 50 INJECTION, SOLUTION INTRAVENOUS at 14:45

## 2019-04-17 RX ADMIN — Medication 10 ML: at 15:17

## 2019-04-17 NOTE — PROGRESS NOTES
mood, behavior, vision, balance, strength, or other problems? NO. Over the past month, have you had any new or worsening symptoms such as fever, night sweats, tiredness that does not go away, weight loss or swollen glands? NO. Patient reminded to immediately report any new or worsening medical problems such as:  A new or sudden change in thinking, memory, speech, mood, behavior, vision, balance,strength. Yes. Fever, night sweats, tiredness that does not go away, weight loss, or swollen glands. Yes. Patient given Belatacept  500 mg IVPB over 30 min IVPB using a .22 micron filter per protocol. Response to treatment:  Well tolerated by patient. Education:    Understands the purpose of this medication. Is having an increase in issues with her IBS lately. To return in 4 weeks. appt. Already made.   Electronically signed by Jovana Valadez RN on 4/17/2019 at 3:47 PM

## 2019-05-15 ENCOUNTER — HOSPITAL ENCOUNTER (OUTPATIENT)
Dept: INFUSION THERAPY | Age: 39
Setting detail: INFUSION SERIES
Discharge: HOME | End: 2019-05-15
Payer: MEDICARE

## 2019-05-15 VITALS
DIASTOLIC BLOOD PRESSURE: 78 MMHG | SYSTOLIC BLOOD PRESSURE: 124 MMHG | BODY MASS INDEX: 39.57 KG/M2 | WEIGHT: 225 LBS | HEART RATE: 75 BPM | RESPIRATION RATE: 18 BRPM | OXYGEN SATURATION: 96 % | TEMPERATURE: 98.9 F

## 2019-05-15 DIAGNOSIS — Z94.0 KIDNEY REPLACED BY TRANSPLANT: Primary | ICD-10-CM

## 2019-05-15 PROCEDURE — 6360000002 HC RX W HCPCS: Performed by: INTERNAL MEDICINE

## 2019-05-15 PROCEDURE — 96365 THER/PROPH/DIAG IV INF INIT: CPT

## 2019-05-15 PROCEDURE — 2580000003 HC RX 258: Performed by: INTERNAL MEDICINE

## 2019-05-15 RX ORDER — SODIUM CHLORIDE 0.9 % (FLUSH) 0.9 %
10 SYRINGE (ML) INJECTION PRN
Status: DISCONTINUED | OUTPATIENT
Start: 2019-05-15 | End: 2019-05-16 | Stop reason: HOSPADM

## 2019-05-15 RX ORDER — CETIRIZINE HYDROCHLORIDE 10 MG/1
10 TABLET ORAL DAILY
COMMUNITY
End: 2020-08-14 | Stop reason: ALTCHOICE

## 2019-05-15 RX ORDER — GABAPENTIN 100 MG/1
200 CAPSULE ORAL NIGHTLY
COMMUNITY

## 2019-05-15 RX ORDER — SODIUM CHLORIDE 0.9 % (FLUSH) 0.9 %
10 SYRINGE (ML) INJECTION PRN
Status: CANCELLED
Start: 2019-06-12

## 2019-05-15 RX ADMIN — Medication 10 ML: at 15:25

## 2019-05-15 RX ADMIN — Medication 10 ML: at 14:52

## 2019-05-15 RX ADMIN — DEXTROSE MONOHYDRATE 500 MG: 50 INJECTION, SOLUTION INTRAVENOUS at 14:53

## 2019-05-15 NOTE — PROGRESS NOTES
Outpatient [id number] [facility]    Belatacept Visit    NAME:  Vianey Ott OF BIRTH:  1980  MEDICAL RECORD NUMBER:  [id number]  DATE:  5/15/2019    Patient arrived to [facility] 58   [] per wheelchair   [x] ambulatory     Color good, no ankle edema, pt very talkative. Denies any new c/o's . Is having problems with seasonal allergies, but is not on any antibiotics and denies any fever or infection . Denies any pain    Diagnosis: s/p kidney transplant    Patient Active Problem List   Diagnosis    Kidney replaced by transplant    Hypercalcemia    High serum parathyroid hormone (PTH)    Hypophosphatemia     Allergies   Allergen Reactions    Dilaudid [Hydromorphone Hcl]     Morphine     Zithromax [Azithromycin]        Patient needs to be seropositive to EBV prior to Belatacept Infusion. Patient is EBV seropositive? Yes    Has patient had TB skin test recently? No, last one given was in 2016    Has patient had any vaccines given recently? No    Has patient been instructed to avoid any live vaccine administrations, such as but not limited to : intranasal influenza, measles, mumps, rubella, oral polio, BCG, yellow fever, varicella, and TY21 typhoid vaccines? Yes    Patient is aware that Belatacept is an immunosuppressant therapy and can increase risk of developing  infections and possibly other  Malignancies?   Yes      Pre Infusion Checklist       Patient given a copy of the medication guide:  Yes       Patient counseled about increased risk for:    Post-Transplant Lymphoproliferative Disorder (PTLD), predominantly involving             the  CNS:  Yes                                                  Increased risk of Progressive Multifocal Leukoencephalopathy (PML), a CNS                    Infection:  Yes                                                                                                   Prior to infusion, these questions were read aloud to patient:  Over the past month, have you had any new or worsening medical problems such as a new or sudden change in your thinking, memory, speech, mood, behavior, vision, balance, strength, or other problems? NO                       Over the past month, have you had any new or worsening symptoms such as fever, night sweats, tiredness that does not go away, weight loss or swollen glands? No                   Patient reminded to immediately report any new or worsening medical problems such as:  A new or sudden change in thinking, memory, speech, mood, behavior, vision, balance,strength. yes                               Fever, night sweats, tiredness that does not go away, weight loss, or swollen glands. Yes                                                             Patient given Belatacept  500 mg IVPB over 30 min IVPB using a .22 micron filter per protocol. Response to treatment:  Well tolerated by patient. Education:  RE S/S OF INFECTION AND WHEN TO CALL md   Verbalized understanding     pT TO RETURN ON  June 11th  At 0900am for next infusion. Pt is to see her nephrologist in June 2019.    Electronically signed by Nahum Gauthier RN on 5/15/2019 at 5:14 PM

## 2019-06-11 ENCOUNTER — HOSPITAL ENCOUNTER (OUTPATIENT)
Dept: INFUSION THERAPY | Age: 39
Setting detail: INFUSION SERIES
Discharge: HOME | End: 2019-06-11
Payer: MEDICARE

## 2019-06-11 VITALS
SYSTOLIC BLOOD PRESSURE: 114 MMHG | WEIGHT: 228.18 LBS | BODY MASS INDEX: 40.13 KG/M2 | TEMPERATURE: 98.6 F | RESPIRATION RATE: 18 BRPM | DIASTOLIC BLOOD PRESSURE: 72 MMHG | HEART RATE: 78 BPM | OXYGEN SATURATION: 98 %

## 2019-06-11 DIAGNOSIS — Z94.0 KIDNEY REPLACED BY TRANSPLANT: Primary | ICD-10-CM

## 2019-06-11 PROCEDURE — 6360000002 HC RX W HCPCS: Performed by: INTERNAL MEDICINE

## 2019-06-11 PROCEDURE — 2580000003 HC RX 258: Performed by: INTERNAL MEDICINE

## 2019-06-11 PROCEDURE — 96365 THER/PROPH/DIAG IV INF INIT: CPT

## 2019-06-11 RX ORDER — SODIUM CHLORIDE 0.9 % (FLUSH) 0.9 %
10 SYRINGE (ML) INJECTION PRN
Status: CANCELLED
Start: 2019-06-12

## 2019-06-11 RX ORDER — SODIUM CHLORIDE 0.9 % (FLUSH) 0.9 %
10 SYRINGE (ML) INJECTION PRN
Status: DISCONTINUED | OUTPATIENT
Start: 2019-06-11 | End: 2019-06-12 | Stop reason: HOSPADM

## 2019-06-11 RX ADMIN — DEXTROSE MONOHYDRATE 500 MG: 50 INJECTION, SOLUTION INTRAVENOUS at 09:45

## 2019-06-11 NOTE — PROGRESS NOTES
Outpatient [id number] [facility]    Belatacept Visit    NAME:  Blanquita Soto OF BIRTH:  1980  MEDICAL RECORD NUMBER:  [id number]  DATE:  6/11/2019    Patient arrived to [facility] 58   [] per wheelchair   [x] ambulatory     Color tan, pt in good spirits, very talkative. Pt just came home from a [place] vacation. Pt denies any new c/o's or any change in medical hx. Diagnosis: s/p kidney transplant     Patient Active Problem List   Diagnosis    Kidney replaced by transplant    Hypercalcemia    High serum parathyroid hormone (PTH)    Hypophosphatemia     Allergies   Allergen Reactions    Dilaudid [Hydromorphone Hcl]     Morphine     Zithromax [Azithromycin]        Patient needs to be seropositive to EBV prior to Belatacept Infusion. Patient is EBV seropositive? Yes    Has patient had TB skin test recently? No, TB test done in 2016. Has patient had any vaccines given recently? No    Has patient been instructed to avoid any live vaccine administrations, such as but not limited to : intranasal influenza, measles, mumps, rubella, oral polio, BCG, yellow fever, varicella, and TY21 typhoid vaccines? Yes    Patient is aware that Belatacept is an immunosuppressant therapy and can increase risk of developing  infections and possibly other  Malignancies?   Yes     /72   Pulse 78   Temp 98.6 °F (37 °C) (Oral)   Resp 18   Wt 228 lb 2.8 oz (103.5 kg)   SpO2 98%   BMI 40.13 kg/m²     Pre Infusion Checklist       Patient given a copy of the medication guide:  Yes       Patient counseled about increased risk for:    Post-Transplant Lymphoproliferative Disorder (PTLD), predominantly involving             the  CNS:  Yes                                                  Increased risk of Progressive Multifocal Leukoencephalopathy (PML), a CNS                    Infection:  Yes Prior to infusion, these questions were read aloud to patient:  Over the past month, have you had any new or worsening medical problems such as a new or sudden change in your thinking, memory, speech, mood, behavior, vision, balance, strength, or other problems? no                       Over the past month, have you had any new or worsening symptoms such as fever, night sweats, tiredness that does not go away, weight loss or swollen glands? no                   Patient reminded to immediately report any new or worsening medical problems such as:  A new or sudden change in thinking, memory, speech, mood, behavior, vision, balance,strength. Yes                                Fever, night sweats, tiredness that does not go away, weight loss, or swollen glands. Yes                                                             Patient given Belatacept 500 mg IVPB over 30 min IVPB using a .22 micron filter per protocol. Response to treatment:  Well tolerated by patient. Education:  Re possible s.e from Belatacept and when to call MD   Verbalized understanding  Pt will see her nephrologist tomorrow for a rt. Appnt. Pt to return on 7/9/19 for next Belatacept appnt.    Electronically signed by Nikita Smith RN on 6/11/2019 at 1300pm

## 2019-07-09 ENCOUNTER — HOSPITAL ENCOUNTER (OUTPATIENT)
Dept: INFUSION THERAPY | Age: 39
Setting detail: INFUSION SERIES
Discharge: HOME | End: 2019-07-09
Payer: MEDICARE

## 2019-07-09 VITALS
OXYGEN SATURATION: 98 % | RESPIRATION RATE: 17 BRPM | WEIGHT: 230 LBS | SYSTOLIC BLOOD PRESSURE: 114 MMHG | TEMPERATURE: 98.3 F | HEART RATE: 72 BPM | DIASTOLIC BLOOD PRESSURE: 74 MMHG | BODY MASS INDEX: 40.45 KG/M2

## 2019-07-09 DIAGNOSIS — Z94.0 KIDNEY REPLACED BY TRANSPLANT: Primary | ICD-10-CM

## 2019-07-09 PROCEDURE — 96365 THER/PROPH/DIAG IV INF INIT: CPT

## 2019-07-09 PROCEDURE — 2580000003 HC RX 258: Performed by: INTERNAL MEDICINE

## 2019-07-09 PROCEDURE — 6360000002 HC RX W HCPCS: Performed by: INTERNAL MEDICINE

## 2019-07-09 RX ORDER — SODIUM CHLORIDE 0.9 % (FLUSH) 0.9 %
10 SYRINGE (ML) INJECTION PRN
Status: CANCELLED
Start: 2019-08-06

## 2019-07-09 RX ORDER — SODIUM CHLORIDE 0.9 % (FLUSH) 0.9 %
10 SYRINGE (ML) INJECTION PRN
Status: DISCONTINUED | OUTPATIENT
Start: 2019-07-09 | End: 2019-07-10 | Stop reason: HOSPADM

## 2019-07-09 RX ADMIN — Medication 10 ML: at 11:06

## 2019-07-09 RX ADMIN — Medication 10 ML: at 09:50

## 2019-07-09 RX ADMIN — DEXTROSE MONOHYDRATE 500 MG: 50 INJECTION, SOLUTION INTRAVENOUS at 10:15

## 2019-07-09 NOTE — PROGRESS NOTES
Outpatient [facility]    Belatacept Visit    NAME:  Junior Matos OF BIRTH:  1980  MEDICAL RECORD NUMBER:  [id number]  DATE:  7/9/2019    Patient arrived to [facility] 58   [] per wheelchair   [x] ambulatory     Color good, tan , pt very talkative, denies any new c/o's / pt states she saw her nephrologist Dr. Gamaliel Nieto recently and MD was pleased with her kidney function . MD made no med changes . Pt to see him again in 3 mos. Pt has a wart on her right thumb, and her PCP has been trying to burn it off with \"acid\" and it has been unsuccessful . Her right thumb is without s/s of infection. Diagnosis: s/p kidney transplant    Patient Active Problem List   Diagnosis    Kidney replaced by transplant    Hypercalcemia    High serum parathyroid hormone (PTH)    Hypophosphatemia     Allergies   Allergen Reactions    Dilaudid [Hydromorphone Hcl]     Morphine     Zithromax [Azithromycin]        Patient needs to be seropositive to EBV prior to Belatacept Infusion. Patient is EBV seropositive? Yes    Has patient had TB skin test recently? No    Has patient had any vaccines given recently? No    Has patient been instructed to avoid any live vaccine administrations, such as but not limited to : intranasal influenza, measles, mumps, rubella, oral polio, BCG, yellow fever, varicella, and TY21 typhoid vaccines? Yes    Patient is aware that Belatacept is an immunosuppressant therapy and can increase risk of developing  infections and possibly other  Malignancies?   Yes     /74   Pulse 76   Temp 98.3 °F (36.8 °C) (Oral)   Resp 17   Wt 230 lb (104.3 kg)   SpO2 98%   BMI 40.45 kg/m²     Pre Infusion Checklist       Patient given a copy of the medication guide:  Yes       Patient counseled about increased risk for:    Post-Transplant Lymphoproliferative Disorder (PTLD), predominantly involving             the  CNS:  Yes

## 2019-08-07 ENCOUNTER — HOSPITAL ENCOUNTER (OUTPATIENT)
Dept: INFUSION THERAPY | Age: 39
Setting detail: INFUSION SERIES
Discharge: HOME | End: 2019-08-07
Payer: MEDICARE

## 2019-08-07 VITALS
HEART RATE: 77 BPM | SYSTOLIC BLOOD PRESSURE: 118 MMHG | TEMPERATURE: 97.7 F | WEIGHT: 230 LBS | DIASTOLIC BLOOD PRESSURE: 78 MMHG | OXYGEN SATURATION: 97 % | BODY MASS INDEX: 40.45 KG/M2 | RESPIRATION RATE: 16 BRPM

## 2019-08-07 DIAGNOSIS — Z94.0 KIDNEY REPLACED BY TRANSPLANT: Primary | ICD-10-CM

## 2019-08-07 PROCEDURE — 2580000003 HC RX 258: Performed by: INTERNAL MEDICINE

## 2019-08-07 PROCEDURE — 96365 THER/PROPH/DIAG IV INF INIT: CPT

## 2019-08-07 PROCEDURE — 6360000002 HC RX W HCPCS: Performed by: INTERNAL MEDICINE

## 2019-08-07 RX ADMIN — DEXTROSE MONOHYDRATE 500 MG: 50 INJECTION, SOLUTION INTRAVENOUS at 10:32

## 2019-08-07 NOTE — PROGRESS NOTES
Outpatient [id number] [facility]    Belatacept Visit    NAME:  Rowdy Garibay OF BIRTH:  1980  MEDICAL RECORD NUMBER:  [id number]  DATE:  8/7/2019    Patient arrived to [facility] 58   [] per wheelchair   [x] ambulatory   Alert and oriented X4. Denies any side effects from last infusion. Denies any new symptoms of infection      Diagnosis: Kidney transplant 8/30/16    Patient Active Problem List   Diagnosis    Kidney replaced by transplant    Hypercalcemia    High serum parathyroid hormone (PTH)    Hypophosphatemia     Allergies   Allergen Reactions    Dilaudid [Hydromorphone Hcl]     Morphine     Zithromax [Azithromycin]        Patient needs to be seropositive to EBV prior to Belatacept Infusion. Patient is EBV seropositive? Yes    Has patient had TB skin test recently? No, 2016 negative    Has patient had any vaccines given recently? No    Has patient been instructed to avoid any live vaccine administrations, such as but not limited to : intranasal influenza, measles, mumps, rubella, oral polio, BCG, yellow fever, varicella, and TY21 typhoid vaccines? Yes    Patient is aware that Belatacept is an immunosuppressant therapy and can increase risk of developing  infections and possibly other  Malignancies?   Yes     /78   Pulse 77   Temp 97.7 °F (36.5 °C) (Oral)   Resp 16   Wt 230 lb (104.3 kg)   SpO2 97%   BMI 40.45 kg/m²     Pre Infusion Checklist       Patient given a copy of the medication guide:  Yes       Patient counseled about increased risk for:    Post-Transplant Lymphoproliferative Disorder (PTLD), predominantly involving             the  CNS:  Yes                                                  Increased risk of Progressive Multifocal Leukoencephalopathy (PML), a CNS                    Infection:  Yes                                                                                                   Prior to infusion, these questions were read aloud to patient:  Over the past month, have you had any new or worsening medical problems such as a new or sudden change in your thinking, memory, speech, mood, behavior, vision, balance, strength, or other problems? No                         Over the past month, have you had any new or worsening symptoms such as fever, night sweats, tiredness that does not go away, weight loss or swollen glands? No                   Patient reminded to immediately report any new or worsening medical problems such as:  A new or sudden change in thinking, memory, speech, mood, behavior, vision, balance,strength. Yes                                Fever, night sweats, tiredness that does not go away, weight loss, or swollen glands. Yes                                                             Patient given Belatacept 500 mg IVPB over 30 min IVPB using a .22 micron filter per protocol. Response to treatment:  Well tolerated by patient. Education:    Verbalized understanding.  Due for next appointment 9/4/19        Electronically signed by Isabelle Castillo RN on 8/7/2019 at 9:58 AM

## 2019-09-04 ENCOUNTER — HOSPITAL ENCOUNTER (OUTPATIENT)
Dept: INFUSION THERAPY | Age: 39
Setting detail: INFUSION SERIES
Discharge: HOME | End: 2019-09-04
Payer: MEDICARE

## 2019-09-04 VITALS
DIASTOLIC BLOOD PRESSURE: 77 MMHG | RESPIRATION RATE: 17 BRPM | HEART RATE: 86 BPM | WEIGHT: 232 LBS | SYSTOLIC BLOOD PRESSURE: 125 MMHG | OXYGEN SATURATION: 97 % | BODY MASS INDEX: 40.8 KG/M2 | TEMPERATURE: 98.8 F

## 2019-09-04 DIAGNOSIS — Z94.0 KIDNEY REPLACED BY TRANSPLANT: Primary | ICD-10-CM

## 2019-09-04 PROCEDURE — 6360000002 HC RX W HCPCS: Performed by: INTERNAL MEDICINE

## 2019-09-04 PROCEDURE — 96365 THER/PROPH/DIAG IV INF INIT: CPT

## 2019-09-04 PROCEDURE — 2580000003 HC RX 258: Performed by: INTERNAL MEDICINE

## 2019-09-04 RX ORDER — METHYLPREDNISOLONE 4 MG/1
4 TABLET ORAL DAILY
COMMUNITY
End: 2019-10-30 | Stop reason: ALTCHOICE

## 2019-09-04 RX ADMIN — DEXTROSE MONOHYDRATE 500 MG: 50 INJECTION, SOLUTION INTRAVENOUS at 10:39

## 2019-09-04 NOTE — PROGRESS NOTES
Outpatient [id number] [facility]    Belatacept Visit    NAME:  Heather Christine OF BIRTH:  1980  MEDICAL RECORD NUMBER:  [id number]  DATE:  9/4/2019    Patient arrived to [facility] 58   [] per wheelchair   [x] ambulatory     Color good. Pt very talkative , in good spirits, no ankle edema. Pt has a wart on her right thumb which she has been getting medical care from her PCP. Pt recently had blisters of nose, mouth and was placed on a Medrol dose pack per her PCP. States today is last day of Medrol dose pack and it has been helpful. Pt has had these blisters previously. Diagnosis: s/p kidney transplant     Patient Active Problem List   Diagnosis    Kidney replaced by transplant    Hypercalcemia    High serum parathyroid hormone (PTH)    Hypophosphatemia     Allergies   Allergen Reactions    Dilaudid [Hydromorphone Hcl]     Morphine     Zithromax [Azithromycin]        Patient needs to be seropositive to EBV prior to Belatacept Infusion. Patient is EBV seropositive? Yes    Has patient had TB skin test recently? No    Has patient had any vaccines given recently? No    Has patient been instructed to avoid any live vaccine administrations, such as but not limited to : intranasal influenza, measles, mumps, rubella, oral polio, BCG, yellow fever, varicella, and TY21 typhoid vaccines? Yes    Patient is aware that Belatacept is an immunosuppressant therapy and can increase risk of developing  infections and possibly other  Malignancies?   Yes     /85   Pulse 88   Temp 98.8 °F (37.1 °C) (Oral)   Resp 17   Wt 232 lb (105.2 kg)   SpO2 97%   BMI 40.80 kg/m²     Pre Infusion Checklist       Patient given a copy of the medication guide:  Yes       Patient counseled about increased risk for:    Post-Transplant Lymphoproliferative Disorder (PTLD), predominantly involving             the  CNS:yes                                                 Increased risk of Progressive Multifocal Leukoencephalopathy (PML), a CNS                    Infection:  yes                                                 Prior to infusion, these questions were read aloud to patient:  Over the past month, have you had any new or worsening medical problems such as a new or sudden change in your thinking, memory, speech, mood, behavior, vision, balance, strength, or other problems? No                         Over the past month, have you had any new or worsening symptoms such as fever, night sweats, tiredness that does not go away, weight loss or swollen glands? No                   Patient reminded to immediately report any new or worsening medical problems such as:  A new or sudden change in thinking, memory, speech, mood, behavior, vision, balance,strength. yes                             Fever, night sweats, tiredness that does not go away, weight loss, or swollen glands. Yes                                                             Patient given Belatacept 500 mg IVPB over 30 min IVPB using a .22 micron filter per protocol. Response to treatment:  Well tolerated by patient. Education:  Re possible s/s of infection and to report to MD immediately   Verbalized understanding     Pt to return for next infusion on   10/2/19   Pt to see her nephrologist on October 3rd for rt. appnt .    Electronically signed by Princess Flores RN on 9/4/2019 at 11:47 AM

## 2019-10-02 ENCOUNTER — HOSPITAL ENCOUNTER (OUTPATIENT)
Dept: INFUSION THERAPY | Age: 39
Setting detail: INFUSION SERIES
Discharge: HOME | End: 2019-10-02
Payer: MEDICARE

## 2019-10-02 VITALS
RESPIRATION RATE: 17 BRPM | HEART RATE: 83 BPM | DIASTOLIC BLOOD PRESSURE: 91 MMHG | TEMPERATURE: 98.6 F | OXYGEN SATURATION: 97 % | SYSTOLIC BLOOD PRESSURE: 132 MMHG

## 2019-10-02 DIAGNOSIS — Z94.0 KIDNEY REPLACED BY TRANSPLANT: Primary | ICD-10-CM

## 2019-10-02 PROCEDURE — 2580000003 HC RX 258: Performed by: INTERNAL MEDICINE

## 2019-10-02 PROCEDURE — 96365 THER/PROPH/DIAG IV INF INIT: CPT

## 2019-10-02 PROCEDURE — 6360000002 HC RX W HCPCS: Performed by: INTERNAL MEDICINE

## 2019-10-02 RX ADMIN — DEXTROSE MONOHYDRATE 500 MG: 50 INJECTION, SOLUTION INTRAVENOUS at 11:58

## 2019-10-02 ASSESSMENT — PAIN DESCRIPTION - LOCATION
LOCATION: HAND

## 2019-10-02 ASSESSMENT — PAIN DESCRIPTION - DESCRIPTORS
DESCRIPTORS: THROBBING

## 2019-10-02 ASSESSMENT — PAIN DESCRIPTION - ORIENTATION
ORIENTATION: RIGHT

## 2019-10-02 ASSESSMENT — PAIN - FUNCTIONAL ASSESSMENT
PAIN_FUNCTIONAL_ASSESSMENT: PREVENTS OR INTERFERES SOME ACTIVE ACTIVITIES AND ADLS

## 2019-10-02 ASSESSMENT — PAIN DESCRIPTION - ONSET
ONSET: ON-GOING

## 2019-10-02 ASSESSMENT — PAIN DESCRIPTION - FREQUENCY
FREQUENCY: INTERMITTENT

## 2019-10-02 ASSESSMENT — PAIN DESCRIPTION - PROGRESSION
CLINICAL_PROGRESSION: NOT CHANGED

## 2019-10-02 ASSESSMENT — PAIN DESCRIPTION - PAIN TYPE
TYPE: SURGICAL PAIN

## 2019-10-02 ASSESSMENT — PAIN SCALES - GENERAL
PAINLEVEL_OUTOF10: 3

## 2019-10-02 NOTE — PROGRESS NOTES
Outpatient [facility] 1979    Belatacept Visit    NAME:  Jurgen Bal OF BIRTH:  1980  MEDICAL RECORD NUMBER:  [id number]  DATE:  10/2/2019    Patient arrived to [facility] 58   [] per wheelchair   [x] ambulatory       Pt came in for her routine Belatacept infusion. Color natural, skin warm and dry. Made comfortable with several blankets as she did not get any sleep last pm at all. Does have some post-op pains from her right thumb where she recently had a wart removed this past week. It's rated a 3/10 and throbs a lot. Bandage tight and said a lot of skin had to be removed as well. Sees surgeon this week for stitches to be removed. Very talkative and in good spirits. S/p kidney transplant. Diagnosis: transplant   Patient Active Problem List   Diagnosis    Kidney replaced by transplant    Hypercalcemia    High serum parathyroid hormone (PTH)    Hypophosphatemia     Allergies   Allergen Reactions    Dilaudid [Hydromorphone Hcl]     Morphine     Zithromax [Azithromycin]      Patient needs to be seropositive to EBV prior to Belatacept Infusion. Patient is EBV seropositive? Yes  Has patient had TB skin test recently? No    Has patient had any vaccines given recently?  '' I will be getting my Flu vaccine this week sometime''  Has patient been instructed to avoid any live vaccine administrations, such as but not limited to : intranasal influenza, measles, mumps, rubella, oral polio, BCG, yellow fever, varicella, and TY21 typhoid vaccines? Yes    Patient is aware that Belatacept is an immunosuppressant therapy and can increase risk of developing  infections and possibly other  Malignancies?   Yes     BP (!) 132/91   Pulse 83   Temp 98.6 °F (37 °C) (Oral)   Resp 17   SpO2 97%     Pre Infusion Checklist     Patient given a copy of the medication guide:  Yes     Patient counseled about increased risk for:  Post-Transplant Lymphoproliferative Disorder (PTLD), predominantly involving  the  CNS:  Yes                                                  Increased risk of Progressive Multifocal Leukoencephalopathy (PML), a CNS  infection:  Yes. Prior to infusion, these questions were read aloud to patient:  Over the past month, have you had any new or worsening medical problems such as a new or sudden change in your thinking, memory, speech, mood, behavior, vision, balance, strength, or other problems? Yes   / this was read out loud to this pt. Over the past month, have you had any new or worsening symptoms such as fever, night sweats, tiredness that does not go away, weight loss or swollen glands? YES              Patient reminded to immediately report any new or worsening medical problems such as:  A new or sudden change in thinking, memory, speech, mood, behavior, vision, balance,strength. Yes, pt was reminded of the above sx.'s.                                Fever, night sweats, tiredness that does not go away, weight loss, or swollen glands. yes , these sx's were mentioned to the patient. Patient given Belatacept  500 mg IVPB over 30 min IVPB using a .22 micron filter per protocol. Response to treatment:  Well tolerated by patient. To see her Nephrologist tomorrow, routinely scheduled. Education:    \"Verbalized understanding\",\"Demonstrated understanding\". To return on October 30 [address] [facility] AT 10;00 AM.  Electronically signed by Efren Elliott RN on 10/2/2019 at 4:19 PM

## 2019-10-30 ENCOUNTER — HOSPITAL ENCOUNTER (OUTPATIENT)
Dept: INFUSION THERAPY | Age: 39
Setting detail: INFUSION SERIES
Discharge: HOME | End: 2019-10-30
Payer: MEDICARE

## 2019-10-30 VITALS
BODY MASS INDEX: 41.33 KG/M2 | TEMPERATURE: 98.7 F | RESPIRATION RATE: 17 BRPM | OXYGEN SATURATION: 100 % | SYSTOLIC BLOOD PRESSURE: 140 MMHG | DIASTOLIC BLOOD PRESSURE: 78 MMHG | WEIGHT: 235 LBS | HEART RATE: 74 BPM

## 2019-10-30 DIAGNOSIS — Z94.0 KIDNEY REPLACED BY TRANSPLANT: Primary | ICD-10-CM

## 2019-10-30 PROCEDURE — 96365 THER/PROPH/DIAG IV INF INIT: CPT

## 2019-10-30 PROCEDURE — 2580000003 HC RX 258: Performed by: INTERNAL MEDICINE

## 2019-10-30 PROCEDURE — 6360000002 HC RX W HCPCS: Performed by: INTERNAL MEDICINE

## 2019-10-30 RX ORDER — BUTALBITAL, ACETAMINOPHEN AND CAFFEINE 50; 325; 40 MG/1; MG/1; MG/1
1 TABLET ORAL EVERY 4 HOURS PRN
COMMUNITY
End: 2021-03-31 | Stop reason: ALTCHOICE

## 2019-10-30 RX ADMIN — DEXTROSE MONOHYDRATE 500 MG: 50 INJECTION, SOLUTION INTRAVENOUS at 11:21

## 2019-10-30 ASSESSMENT — PAIN DESCRIPTION - LOCATION
LOCATION: HAND

## 2019-10-30 ASSESSMENT — PAIN SCALES - GENERAL
PAINLEVEL_OUTOF10: 2

## 2019-10-30 ASSESSMENT — PAIN DESCRIPTION - ORIENTATION
ORIENTATION: RIGHT

## 2019-10-30 ASSESSMENT — PAIN - FUNCTIONAL ASSESSMENT
PAIN_FUNCTIONAL_ASSESSMENT: ACTIVITIES ARE NOT PREVENTED

## 2019-10-30 ASSESSMENT — PAIN DESCRIPTION - PROGRESSION
CLINICAL_PROGRESSION: GRADUALLY IMPROVING

## 2019-10-30 ASSESSMENT — PAIN DESCRIPTION - DESCRIPTORS
DESCRIPTORS: ACHING

## 2019-10-30 ASSESSMENT — PAIN DESCRIPTION - PAIN TYPE
TYPE: SURGICAL PAIN

## 2019-10-30 ASSESSMENT — PAIN DESCRIPTION - FREQUENCY
FREQUENCY: INTERMITTENT

## 2019-10-30 ASSESSMENT — PAIN DESCRIPTION - ONSET
ONSET: ON-GOING

## 2019-10-30 NOTE — PROGRESS NOTES
Outpatient [id number] [facility]    Belatacept Visit    NAME:  Ernestina Jorge OF BIRTH:  1980  MEDICAL RECORD NUMBER:  [id number]  DATE:  10/30/2019    Patient arrived to [facility] 58   [] per wheelchair   [x] ambulatory        Pharmacy did call me in regards to her dosing of Belatacept today. I called the ordering MD to leave a msg. At 0950  Then I did send a fax to MD for dose confirmation at 0958 am.   Cleared up the order and she did still qualify for the infusion at the 500 mgs of Belatacept. Pharmacy called and they did then agree. Med. Ordered. She did c/o of some dermatitis on her face of red ''measle-like'' areas that she has been taking steroids for. No active illnesses, aches or fevers. i've had this for awhile now.''   Diagnosis: KIDNEY TRANSPLANT PT. Patient Active Problem List   Diagnosis    Kidney replaced by transplant    Hypercalcemia    High serum parathyroid hormone (PTH)    Hypophosphatemia     Allergies   Allergen Reactions    Dilaudid [Hydromorphone Hcl]     Morphine     Zithromax [Azithromycin]        Patient needs to be seropositive to EBV prior to Belatacept Infusion. Patient is EBV seropositive? Yes. Has patient had TB skin test recently? NO. Last TB test was in 2016. Has patient had any vaccines given recently? NO. Has patient been instructed to avoid any live vaccine administrations, such as but not limited to : intranasal influenza, measles, mumps, rubella, oral polio, BCG, yellow fever, varicella, and TY21 typhoid vaccines? No.    Patient is aware that Belatacept is an immunosuppressant therapy and can increase risk of developing  infections and possibly other  Malignancies? Yes. Wt 235 lb (106.6 kg)   BMI 41.33 kg/m²     Pre Infusion Checklist       Patient given a copy of the medication guide:  Yes upon the start of her infusions.        Patient counseled about increased risk for:  Post-Transplant Lymphoproliferative Disorder (PTLD), predominantly involving             the  CNS:  Yes. Increased risk of Progressive Multifocal Leukoencephalopathy (PML), a CNS                    Infection:  yes . Prior to infusion, these questions were read aloud to patient:  Over the past month, have you had any new or worsening medical problems such as a new or sudden change in your thinking, memory, speech, mood, behavior, vision, balance, strength, or other problems? Yes still needs to get her flu shot too. Over the past month, have you had any new or worsening symptoms such as fever, night sweats, tiredness that does not go away, weight loss or swollen glands? No                   Patient reminded to immediately report any new or worsening medical problems such as:  A new or sudden change in thinking, memory, speech, mood, behavior, vision, balance,strength.  yes . Fever, night sweats, tiredness that does not go away, weight loss, or swollen glands. Yes. Patient given Belatacept  500 mg IVPB over 30 min IVPB using a .22 micron filter per protocol. Response to treatment:  Well tolerated by patient. Education:    Indicates understanding  Pt did make her next 2 appt.'s, returning Nov. 26 th and Dec. 26 th, 2019. No complications during infusion. She did say that her right ACF was a little tender from IV attempt that was unsuccessful earlier. Ice applied to that area and upon her leaving the site was numb. Right thumb area was totally healed from her wart removal and thumb now has full ROJM.     Electronically signed by Charly Valadez RN on 10/30/2019 at 5:05 PM

## 2019-11-26 ENCOUNTER — HOSPITAL ENCOUNTER (OUTPATIENT)
Dept: INFUSION THERAPY | Age: 39
Setting detail: INFUSION SERIES
Discharge: HOME | End: 2019-11-26
Payer: MEDICARE

## 2019-12-02 ENCOUNTER — HOSPITAL ENCOUNTER (OUTPATIENT)
Dept: INFUSION THERAPY | Age: 39
Setting detail: INFUSION SERIES
Discharge: HOME | End: 2019-12-02
Payer: MEDICARE

## 2019-12-02 VITALS
BODY MASS INDEX: 41.5 KG/M2 | SYSTOLIC BLOOD PRESSURE: 124 MMHG | TEMPERATURE: 98.4 F | OXYGEN SATURATION: 97 % | RESPIRATION RATE: 18 BRPM | DIASTOLIC BLOOD PRESSURE: 81 MMHG | WEIGHT: 236 LBS | HEART RATE: 80 BPM

## 2019-12-02 DIAGNOSIS — Z94.0 KIDNEY REPLACED BY TRANSPLANT: Primary | ICD-10-CM

## 2019-12-02 PROCEDURE — 6360000002 HC RX W HCPCS: Performed by: INTERNAL MEDICINE

## 2019-12-02 PROCEDURE — 96365 THER/PROPH/DIAG IV INF INIT: CPT

## 2019-12-02 PROCEDURE — 2580000003 HC RX 258: Performed by: INTERNAL MEDICINE

## 2019-12-02 RX ORDER — SODIUM CHLORIDE 0.9 % (FLUSH) 0.9 %
10 SYRINGE (ML) INJECTION PRN
Status: DISCONTINUED | OUTPATIENT
Start: 2019-12-02 | End: 2019-12-03 | Stop reason: HOSPADM

## 2019-12-02 RX ORDER — PROMETHAZINE HYDROCHLORIDE AND CODEINE PHOSPHATE 6.25; 1 MG/5ML; MG/5ML
5 SYRUP ORAL 4 TIMES DAILY PRN
COMMUNITY
End: 2019-12-30

## 2019-12-02 RX ORDER — SODIUM CHLORIDE 0.9 % (FLUSH) 0.9 %
10 SYRINGE (ML) INJECTION PRN
Status: CANCELLED
Start: 2019-12-02

## 2019-12-02 RX ORDER — LEVALBUTEROL INHALATION SOLUTION 0.31 MG/3ML
1 SOLUTION RESPIRATORY (INHALATION) EVERY 6 HOURS PRN
COMMUNITY

## 2019-12-02 RX ORDER — SODIUM CHLORIDE 0.9 % (FLUSH) 0.9 %
10 SYRINGE (ML) INJECTION PRN
Status: CANCELLED
Start: 2019-12-30

## 2019-12-02 RX ORDER — AMOXICILLIN AND CLAVULANATE POTASSIUM 875; 125 MG/1; MG/1
1 TABLET, FILM COATED ORAL 2 TIMES DAILY
COMMUNITY
End: 2019-12-30

## 2019-12-02 RX ADMIN — DEXTROSE MONOHYDRATE 500 MG: 50 INJECTION, SOLUTION INTRAVENOUS at 12:40

## 2019-12-02 RX ADMIN — Medication 10 ML: at 12:22

## 2019-12-02 RX ADMIN — Medication 10 ML: at 13:16

## 2019-12-03 NOTE — PROGRESS NOTES
Outpatient [id number] [facility]    Belatacept Visit    NAME:  Roger Stone OF BIRTH:  1980  MEDICAL RECORD NUMBER:  [id number]  DATE:  12/2/2019    Patient arrived to [facility] 58   [] per wheelchair   [x] ambulatory   Call placed to nephrologist this am per [facility] RN , spoke to Medical assistant in office , and she spoke to Dr. Sita Bardales MD aware that pt is on her second round of oral antibiotcs and is slowly improving . Pt denies fever , OK per MD for pt to receive her Belatacept today . Color fair, pt is coughing , has dx of acute bronchitis . Pt seen by NP last week on Tuesday due to a cold and sore throat . and was placed on Doxycycline, was only able to take it for 2 days ,  caused her nausea and emesis. Pt placed on Amoxiciliin BID for 7 days--started it on Friday 11/29/19  Per NP. Pt denies fever, has fatigue and dry nagging cough and increased cough when she moves around and talks . Has only a  sl. Prod cough of yellow mucus. Pt denies any wheezing. Pt has a HHN at home , not using consistently , encouraged to do so. Lung sounds --clear, no wheezing noted . Diagnosis: s/p kidney transplant     Patient Active Problem List   Diagnosis    Kidney replaced by transplant    Hypercalcemia    High serum parathyroid hormone (PTH)    Hypophosphatemia     Allergies   Allergen Reactions    Dilaudid [Hydromorphone Hcl]     Morphine     Zithromax [Azithromycin]        Patient needs to be seropositive to EBV prior to Belatacept Infusion. Patient is EBV seropositive? Yes    Has patient had TB skin test recently? No    Has patient had any vaccines given recently? No    Has patient been instructed to avoid any live vaccine administrations, such as but not limited to : intranasal influenza, measles, mumps, rubella, oral polio, BCG, yellow fever, varicella, and TY21 typhoid vaccines?   Yes    Patient is aware that Belatacept is an immunosuppressant therapy and can increase risk of developing  infections and possibly other  Malignancies? Yes     Wt 236 lb (107 kg)   BMI 41.50 kg/m²     Pre Infusion Checklist       Patient given a copy of the medication guide:  Yes       Patient counseled about increased risk for:    Post-Transplant Lymphoproliferative Disorder (PTLD), predominantly involving             the  CNS:  Yes                                                  Increased risk of Progressive Multifocal Leukoencephalopathy (PML), a CNS                    Infection:  Yes                                                                                                   Prior to infusion, these questions were read aloud to patient:  Over the past month, have you had any new or worsening medical problems such as a new or sudden change in your thinking, memory, speech, mood, behavior, vision, balance, strength, or other problems? No                         Over the past month, have you had any new or worsening symptoms such as fever, night sweats, tiredness that does not go away, weight loss or swollen glands? Yes , see information above in nsg notes                    Patient reminded to immediately report any new or worsening medical problems such as:  A new or sudden change in thinking, memory, speech, mood, behavior, vision, balance,strength. Yes                                Fever, night sweats, tiredness that does not go away, weight loss, or swollen glands. Yes                                                             Patient given Belatacept  500  mg IVPB over 30 min IVPB using a .22 micron filter per protocol. Response to treatment:  Well tolerated by patient. Education: to call MD immediately for high fever, or increasing cough or night sweats or feeling worse despite being on antibiotics  . Instructed to continue to monitor her temperature and to use her HHN several times a day. Verbalized understanding  Pt to return on dec.  30th at 1130am for next infusion. Pt to see her kidney MD on Jan. 8, 2020.    Electronically signed by Mi Winston RN on 12/2/2019 at 1730pm

## 2019-12-30 ENCOUNTER — HOSPITAL ENCOUNTER (OUTPATIENT)
Dept: INFUSION THERAPY | Age: 39
Setting detail: INFUSION SERIES
Discharge: HOME | End: 2019-12-30
Payer: MEDICARE

## 2019-12-30 VITALS
BODY MASS INDEX: 41.5 KG/M2 | DIASTOLIC BLOOD PRESSURE: 94 MMHG | HEART RATE: 73 BPM | RESPIRATION RATE: 17 BRPM | SYSTOLIC BLOOD PRESSURE: 150 MMHG | TEMPERATURE: 98.4 F | OXYGEN SATURATION: 99 % | WEIGHT: 236 LBS

## 2019-12-30 DIAGNOSIS — Z94.0 KIDNEY REPLACED BY TRANSPLANT: Primary | ICD-10-CM

## 2019-12-30 PROCEDURE — 2580000003 HC RX 258: Performed by: INTERNAL MEDICINE

## 2019-12-30 PROCEDURE — 6360000002 HC RX W HCPCS: Performed by: INTERNAL MEDICINE

## 2019-12-30 PROCEDURE — 96365 THER/PROPH/DIAG IV INF INIT: CPT

## 2019-12-30 RX ORDER — SODIUM CHLORIDE 0.9 % (FLUSH) 0.9 %
10 SYRINGE (ML) INJECTION PRN
Status: DISCONTINUED | OUTPATIENT
Start: 2019-12-30 | End: 2019-12-31 | Stop reason: HOSPADM

## 2019-12-30 RX ORDER — SODIUM CHLORIDE 0.9 % (FLUSH) 0.9 %
10 SYRINGE (ML) INJECTION PRN
Status: CANCELLED
Start: 2020-01-27

## 2019-12-30 RX ADMIN — Medication 10 ML: at 12:18

## 2019-12-30 RX ADMIN — DEXTROSE MONOHYDRATE 500 MG: 50 INJECTION, SOLUTION INTRAVENOUS at 11:41

## 2019-12-30 ASSESSMENT — PAIN SCALES - GENERAL: PAINLEVEL_OUTOF10: 0

## 2019-12-30 NOTE — PROGRESS NOTES
Outpatient [facility] 1979    Belatacept Visit    NAME:  Rowdy Garibay OF BIRTH:  1980  MEDICAL RECORD NUMBER:  [id number]  DATE:  12/30/2019    Patient arrived to [facility] 58   [] per wheelchair   [x] ambulatory       Diagnosis: right kidney  transplant    Patient Active Problem List   Diagnosis    Kidney replaced by transplant    Hypercalcemia    High serum parathyroid hormone (PTH)    Hypophosphatemia     Allergies   Allergen Reactions    Dilaudid [Hydromorphone Hcl]     Morphine     Zithromax [Azithromycin]        Patient needs to be seropositive to EBV prior to Belatacept Infusion. Patient is EBV seropositive? Yes    Has patient had TB skin test recently? No, last one was in 2016    Has patient had any vaccines given recently? No    Has patient been instructed to avoid any live vaccine administrations, such as but not limited to : intranasal influenza, measles, mumps, rubella, oral polio, BCG, yellow fever, varicella, and TY21 typhoid vaccines? Yes    Patient is aware that Belatacept is an immunosuppressant therapy and can increase risk of developing  infections and possibly other  Malignancies?   Yes     BP (!) 150/94   Pulse 73   Temp 98.4 °F (36.9 °C) (Oral)   Resp 17   Wt 236 lb (107 kg)   SpO2 99%   BMI 41.50 kg/m²     Pre Infusion Checklist       Patient given a copy of the medication guide:  Yes       Patient counseled about increased risk for:    Post-Transplant Lymphoproliferative Disorder (PTLD), predominantly involving             the  CNS:  Yes                                                  Increased risk of Progressive Multifocal Leukoencephalopathy (PML), a CNS                    Infection:  Yes                                                                                                   Prior to infusion, these questions were read aloud to patient:  Over the past month, have you had any new or worsening medical problems such as a new or sudden change in your thinking, memory, speech, mood, behavior, vision, balance, strength, or other problems? No                         Over the past month, have you had any new or worsening symptoms such as fever, night sweats, tiredness that does not go away, weight loss or swollen glands? No                   Patient reminded to immediately report any new or worsening medical problems such as:  A new or sudden change in thinking, memory, speech, mood, behavior, vision, balance,strength. Yes                                Fever, night sweats, tiredness that does not go away, weight loss, or swollen glands. Yes                                                             Patient given Belatacept 500 mg IVPB over 30 min IVPB using a .22 micron filter per protocol. Response to treatment:  Well tolerated by patient.     Education:    Verbalized understanding     Next visit is on 1/29/19 at 10 AM    Electronically signed by Norbert Pinto RN on 12/30/2019 at 11:37 AM

## 2020-01-29 ENCOUNTER — APPOINTMENT (OUTPATIENT)
Dept: INFUSION THERAPY | Age: 40
End: 2020-01-29
Payer: MEDICARE

## 2020-01-31 ENCOUNTER — HOSPITAL ENCOUNTER (OUTPATIENT)
Dept: INFUSION THERAPY | Age: 40
Setting detail: INFUSION SERIES
Discharge: HOME | End: 2020-01-31
Payer: MEDICARE

## 2020-01-31 VITALS
DIASTOLIC BLOOD PRESSURE: 78 MMHG | BODY MASS INDEX: 40.94 KG/M2 | OXYGEN SATURATION: 96 % | RESPIRATION RATE: 18 BRPM | HEART RATE: 76 BPM | WEIGHT: 232.81 LBS | SYSTOLIC BLOOD PRESSURE: 117 MMHG | TEMPERATURE: 97.8 F

## 2020-01-31 DIAGNOSIS — Z94.0 KIDNEY REPLACED BY TRANSPLANT: Primary | ICD-10-CM

## 2020-01-31 PROCEDURE — 6360000002 HC RX W HCPCS: Performed by: INTERNAL MEDICINE

## 2020-01-31 PROCEDURE — 96365 THER/PROPH/DIAG IV INF INIT: CPT

## 2020-01-31 PROCEDURE — 2580000003 HC RX 258: Performed by: INTERNAL MEDICINE

## 2020-01-31 RX ORDER — SODIUM CHLORIDE 0.9 % (FLUSH) 0.9 %
10 SYRINGE (ML) INJECTION PRN
Status: DISCONTINUED | OUTPATIENT
Start: 2020-01-31 | End: 2020-02-01 | Stop reason: HOSPADM

## 2020-01-31 RX ORDER — SODIUM CHLORIDE 0.9 % (FLUSH) 0.9 %
10 SYRINGE (ML) INJECTION PRN
Status: CANCELLED
Start: 2020-02-28

## 2020-01-31 RX ADMIN — Medication 10 ML: at 11:15

## 2020-01-31 RX ADMIN — Medication 10 ML: at 10:25

## 2020-01-31 RX ADMIN — DEXTROSE MONOHYDRATE 500 MG: 50 INJECTION, SOLUTION INTRAVENOUS at 10:33

## 2020-01-31 ASSESSMENT — PAIN SCALES - GENERAL: PAINLEVEL_OUTOF10: 0

## 2020-01-31 NOTE — PROGRESS NOTES
Outpatient [id number] [facility]    Belatacept Visit    NAME:  Riley Duran OF BIRTH:  1980  MEDICAL RECORD NUMBER:  [id number]  DATE:  1/31/2020    Patient arrived to [facility] 58   [] per wheelchair   [x] ambulatory       Diagnosis: Kidney transplant 8/31/16    Patient Active Problem List   Diagnosis    Kidney replaced by transplant    Hypercalcemia    High serum parathyroid hormone (PTH)    Hypophosphatemia     Allergies   Allergen Reactions    Dilaudid [Hydromorphone Hcl]     Morphine     Zithromax [Azithromycin]        Patient needs to be seropositive to EBV prior to Belatacept Infusion. Patient is EBV seropositive? Yes    Has patient had TB skin test or Quantiferon Gold TB test recently? No, will need to check with nephrologist to see if he wants to get a Quantiferon TB    Has patient had any vaccines given recently? No not since flu shot in October    Has patient been instructed to avoid any live vaccine administrations, such as but not limited to : intranasal influenza, measles, mumps, rubella, oral polio, BCG, yellow fever, varicella, and TY21 typhoid vaccines? Yes    Avoid prolonged sun light, tanning beds or sun lights. Use sun screen. Immunosuppression can increase risk of skin cancers. Patient is aware that Belatacept is an immunosuppressant therapy and can increase risk of developing  infections and possibly other  Malignancies? Yes     Wt 232 lb 12.9 oz (105.6 kg)   BMI 40.94 kg/m²     Pre Infusion Checklist       Patient given a copy of the medication guide:  Yes with first visit and again today.        Patient counseled about increased risk for:    Post-Transplant Lymphoproliferative Disorder (PTLD), predominantly involving the  CNS:  Yes                                                  Increased risk of Progressive Multifocal Leukoencephalopathy (PML), a CNS Infection:  Yes

## 2020-02-26 ENCOUNTER — HOSPITAL ENCOUNTER (OUTPATIENT)
Dept: INFUSION THERAPY | Age: 40
Setting detail: INFUSION SERIES
Discharge: HOME | End: 2020-02-26
Payer: MEDICARE

## 2020-02-26 VITALS
DIASTOLIC BLOOD PRESSURE: 101 MMHG | TEMPERATURE: 98.7 F | BODY MASS INDEX: 40.45 KG/M2 | HEART RATE: 76 BPM | WEIGHT: 230 LBS | RESPIRATION RATE: 17 BRPM | OXYGEN SATURATION: 99 % | SYSTOLIC BLOOD PRESSURE: 147 MMHG

## 2020-02-26 DIAGNOSIS — Z94.0 KIDNEY REPLACED BY TRANSPLANT: Primary | ICD-10-CM

## 2020-02-26 PROCEDURE — 96365 THER/PROPH/DIAG IV INF INIT: CPT

## 2020-02-26 PROCEDURE — 2580000003 HC RX 258: Performed by: INTERNAL MEDICINE

## 2020-02-26 PROCEDURE — 6360000002 HC RX W HCPCS: Performed by: INTERNAL MEDICINE

## 2020-02-26 RX ORDER — SODIUM CHLORIDE 0.9 % (FLUSH) 0.9 %
10 SYRINGE (ML) INJECTION PRN
Status: CANCELLED
Start: 2020-03-25

## 2020-02-26 RX ORDER — SODIUM CHLORIDE 0.9 % (FLUSH) 0.9 %
10 SYRINGE (ML) INJECTION PRN
Status: DISCONTINUED | OUTPATIENT
Start: 2020-02-26 | End: 2020-02-27 | Stop reason: HOSPADM

## 2020-02-26 RX ADMIN — Medication 10 ML: at 10:20

## 2020-02-26 RX ADMIN — DEXTROSE MONOHYDRATE 500 MG: 50 INJECTION, SOLUTION INTRAVENOUS at 10:43

## 2020-02-26 RX ADMIN — Medication 20 ML: at 11:24

## 2020-02-26 ASSESSMENT — PAIN SCALES - GENERAL
PAINLEVEL_OUTOF10: 0
PAINLEVEL_OUTOF10: 0

## 2020-02-26 NOTE — PROGRESS NOTES
Outpatient [id number] [facility]    Belatacept Visit    NAME:  Drew Murphy OF BIRTH:  1980  MEDICAL RECORD NUMBER:  [id number]  DATE:  2/26/2020    Patient arrived to [facility] 58   [] per wheelchair   [x] ambulatory       Diagnosis: kidney transplant on 08/31/18. Patient Active Problem List   Diagnosis    Kidney replaced by transplant    Hypercalcemia    High serum parathyroid hormone (PTH)    Hypophosphatemia     Allergies   Allergen Reactions    Dilaudid [Hydromorphone Hcl]     Morphine     Zithromax [Azithromycin]        Patient needs to be seropositive to EBV prior to Belatacept Infusion. Patient is EBV seropositive? Yes. Has patient had TB skin test or Quantiferon Gold TB test recently? No.    Has patient had any vaccines given recently? No , had yearly flu shot in October. Has patient been instructed to avoid any live vaccine administrations, such as but not limited to : intranasal influenza, measles, mumps, rubella, oral polio, BCG, yellow fever, varicella, and TY21 typhoid vaccines? yes    Avoid prolonged sun light, tanning beds or sun lights. Use sun screen. Immunosuppression can increase risk of skin cancers. yes, however she is headed to [place] for 5 days. Patient is aware that Belatacept is an immunosuppressant therapy and can increase risk of developing  infections and possibly other  Malignancies? yes      /82   Pulse 77   Resp 17   Wt 230 lb (104.3 kg)   SpO2 99%   BMI 40.45 kg/m²     Pre Infusion Checklist       Patient given a copy of the medication guide:  yes   patient counseled about increased risk for:    Yes. Post-Transplant Lymphoproliferative Disorder (PTLD), predominantly involving the  CNS:  Yes. Increased risk of Progressive Multifocal Leukoencephalopathy (PML), a CNS Infection:  Yes. Over the past month, have you had any new or worsening medical problems such as a new or sudden change in your thinking, memory, speech, mood, behavior, vision, balance, strength, or other problems? No.                        Over the past month, have you had any new or worsening symptoms such as fever, night sweats, tiredness that does not go away, weight loss or swollen glands? YES / fatigue and night sweats. Patient counseled to report any of these symptoms to physician right away. yes    Patient given Belatacept 500 mg IVPB over 30 min IVPB using a .22 micron filter per protocol. yes    Response to treatment:  Well tolerated by patient. Education:    Indicates understanding  To return on 3/25/20 for a repeat infusion.   Electronically signed by Anibal Fisher RN on 2/26/2020 at 11:40 AM

## 2020-03-25 ENCOUNTER — HOSPITAL ENCOUNTER (OUTPATIENT)
Dept: INFUSION THERAPY | Age: 40
Setting detail: INFUSION SERIES
Discharge: HOME | End: 2020-03-25
Payer: MEDICARE

## 2020-03-25 VITALS
RESPIRATION RATE: 17 BRPM | WEIGHT: 226 LBS | TEMPERATURE: 98.1 F | SYSTOLIC BLOOD PRESSURE: 106 MMHG | DIASTOLIC BLOOD PRESSURE: 66 MMHG | OXYGEN SATURATION: 98 % | BODY MASS INDEX: 39.75 KG/M2 | HEART RATE: 80 BPM

## 2020-03-25 DIAGNOSIS — Z94.0 KIDNEY REPLACED BY TRANSPLANT: Primary | ICD-10-CM

## 2020-03-25 PROCEDURE — 96365 THER/PROPH/DIAG IV INF INIT: CPT

## 2020-03-25 PROCEDURE — 2580000003 HC RX 258: Performed by: INTERNAL MEDICINE

## 2020-03-25 PROCEDURE — 6360000002 HC RX W HCPCS: Performed by: INTERNAL MEDICINE

## 2020-03-25 RX ORDER — SODIUM CHLORIDE 0.9 % (FLUSH) 0.9 %
10 SYRINGE (ML) INJECTION PRN
Status: DISCONTINUED | OUTPATIENT
Start: 2020-03-25 | End: 2020-03-26 | Stop reason: HOSPADM

## 2020-03-25 RX ORDER — SODIUM CHLORIDE 0.9 % (FLUSH) 0.9 %
10 SYRINGE (ML) INJECTION PRN
Status: CANCELLED
Start: 2020-04-22

## 2020-03-25 RX ADMIN — Medication 20 ML: at 10:58

## 2020-03-25 RX ADMIN — Medication 10 ML: at 10:15

## 2020-03-25 RX ADMIN — DEXTROSE MONOHYDRATE 500 MG: 50 INJECTION, SOLUTION INTRAVENOUS at 10:25

## 2020-03-25 ASSESSMENT — PAIN SCALES - GENERAL: PAINLEVEL_OUTOF10: 0

## 2020-03-25 NOTE — PROGRESS NOTES
Outpatient [id number] [facility]    Belatacept Visit    NAME:  Jurgen Bal OF BIRTH:  1980  MEDICAL RECORD NUMBER:  [id number]  DATE:  3/25/2020    Patient arrived to [facility] 58   [] per wheelchair   [x] ambulatory       Diagnosis: kidney transplant pt.  8/31/18. Patient Active Problem List   Diagnosis    Kidney replaced by transplant    Hypercalcemia    High serum parathyroid hormone (PTH)    Hypophosphatemia     Allergies   Allergen Reactions    Dilaudid [Hydromorphone Hcl]     Morphine     Zithromax [Azithromycin]      Patient needs to be seropositive to EBV prior to Belatacept Infusion. Patient is EBV seropositive?  yes   Has patient had TB skin test or Quantiferon Gold TB test recently? No still awaiting MD order for blood test.}    Has patient had any vaccines given recently? no    Has patient been instructed to avoid any live vaccine administrations, such as but not limited to : intranasal influenza, measles, mumps, rubella, oral polio, BCG, yellow fever, varicella, and TY21 typhoid vaccines? Yes. Avoid prolonged sun light, tanning beds or sun lights. Use sun screen. Immunosuppression can increase risk of skin cancers. Just came back from [place] to visit her parents, beach closed most of the time. Pt aware of avoiding excessive sun. Patient is aware that Belatacept is an immunosuppressant therapy and can increase risk of developing  infections and possibly other  Malignancies? Yes. Wt 226 lb (102.5 kg)   BMI 39.75 kg/m²     Pre Infusion Checklist       Patient given a copy of the medication guide:  Yes. Patient counseled about increased risk for:    Post-Transplant Lymphoproliferative Disorder (PTLD), predominantly involving the  CNS:  Yes. Increased risk of Progressive Multifocal Leukoencephalopathy (PML), a CNS Infection:  Yes. Over the past month, have you had any new or worsening medical problems such as a new or sudden change in your thinking, memory, speech, mood, behavior, vision, balance, strength, or other problems? No.                         Over the past month, have you had any new or worsening symptoms such as fever, night sweats, tiredness that does not go away, weight loss or swollen glands? No.  However, she freed have a small red right, lower eyelid that does itch a little. She did talk to her MD office as I was in the room, so they are following up on this, afebrile. The other eye is fine. Patient counseled to report any of these symptoms to physician right away. N/A    Patient given Belatacept 500 mg IVPB over 30 min IVPB using a .22 micron filter per protocol. Response to treatment:  Well tolerated by patient. Education:    Indicates understanding  To return April 22, 2020 at 10:00 AM FOR THE SAME INFUSION.      Electronically signed by Kael Alonzo RN on 3/25/2020 at 11:25 AM

## 2020-04-16 ENCOUNTER — TELEPHONE (OUTPATIENT)
Dept: INFUSION THERAPY | Age: 40
End: 2020-04-16

## 2020-04-21 ENCOUNTER — TELEPHONE (OUTPATIENT)
Dept: INFUSION THERAPY | Age: 40
End: 2020-04-21

## 2020-04-21 ENCOUNTER — HOSPITAL ENCOUNTER (OUTPATIENT)
Dept: INFUSION THERAPY | Age: 40
Setting detail: INFUSION SERIES
End: 2020-04-21
Payer: MEDICARE

## 2020-04-22 ENCOUNTER — HOSPITAL ENCOUNTER (OUTPATIENT)
Dept: INFUSION THERAPY | Age: 40
Setting detail: INFUSION SERIES
End: 2020-04-22
Payer: MEDICARE

## 2020-04-23 ENCOUNTER — HOSPITAL ENCOUNTER (OUTPATIENT)
Dept: INFUSION THERAPY | Age: 40
Setting detail: INFUSION SERIES
Discharge: HOME | End: 2020-04-23
Payer: MEDICARE

## 2020-04-23 VITALS
OXYGEN SATURATION: 98 % | SYSTOLIC BLOOD PRESSURE: 135 MMHG | WEIGHT: 225 LBS | TEMPERATURE: 98.8 F | DIASTOLIC BLOOD PRESSURE: 86 MMHG | HEART RATE: 82 BPM | BODY MASS INDEX: 39.57 KG/M2 | RESPIRATION RATE: 17 BRPM

## 2020-04-23 DIAGNOSIS — Z94.0 KIDNEY REPLACED BY TRANSPLANT: Primary | ICD-10-CM

## 2020-04-23 LAB
A/G RATIO: 1.6 (ref 1.1–2.2)
ALBUMIN SERPL-MCNC: 4.3 G/DL (ref 3.4–5)
ALP BLD-CCNC: 68 U/L (ref 40–129)
ALT SERPL-CCNC: 20 U/L (ref 10–40)
ANION GAP SERPL CALCULATED.3IONS-SCNC: 16 MMOL/L (ref 3–16)
AST SERPL-CCNC: 19 U/L (ref 15–37)
BASOPHILS ABSOLUTE: 0 K/UL (ref 0–0.2)
BASOPHILS RELATIVE PERCENT: 0.6 %
BILIRUB SERPL-MCNC: 0.3 MG/DL (ref 0–1)
BUN BLDV-MCNC: 12 MG/DL (ref 7–20)
CALCIUM IONIZED: 1.21 MMOL/L (ref 1.12–1.32)
CALCIUM SERPL-MCNC: 10 MG/DL (ref 8.3–10.6)
CHLORIDE BLD-SCNC: 100 MMOL/L (ref 99–110)
CHOLESTEROL, TOTAL: 196 MG/DL (ref 0–199)
CO2: 21 MMOL/L (ref 21–32)
CREAT SERPL-MCNC: 0.6 MG/DL (ref 0.6–1.1)
CREATININE URINE: 106.5 MG/DL (ref 28–259)
EOSINOPHILS ABSOLUTE: 0.4 K/UL (ref 0–0.6)
EOSINOPHILS RELATIVE PERCENT: 4.6 %
ESTIMATED AVERAGE GLUCOSE: 105.4 MG/DL
GFR AFRICAN AMERICAN: >60
GFR NON-AFRICAN AMERICAN: >60
GLOBULIN: 2.7 G/DL
GLUCOSE BLD-MCNC: 87 MG/DL (ref 70–99)
HBA1C MFR BLD: 5.3 %
HCT VFR BLD CALC: 39 % (ref 36–48)
HDLC SERPL-MCNC: 34 MG/DL (ref 40–60)
HEMOGLOBIN: 12.9 G/DL (ref 12–16)
LDL CHOLESTEROL CALCULATED: 132 MG/DL
LYMPHOCYTES ABSOLUTE: 2.1 K/UL (ref 1–5.1)
LYMPHOCYTES RELATIVE PERCENT: 26.9 %
MAGNESIUM: 2 MG/DL (ref 1.8–2.4)
MCH RBC QN AUTO: 29.4 PG (ref 26–34)
MCHC RBC AUTO-ENTMCNC: 33.1 G/DL (ref 31–36)
MCV RBC AUTO: 88.7 FL (ref 80–100)
MONOCYTES ABSOLUTE: 0.6 K/UL (ref 0–1.3)
MONOCYTES RELATIVE PERCENT: 7.5 %
NEUTROPHILS ABSOLUTE: 4.7 K/UL (ref 1.7–7.7)
NEUTROPHILS RELATIVE PERCENT: 60.4 %
PARATHYROID HORMONE INTACT: 60.4 PG/ML (ref 14–72)
PDW BLD-RTO: 14 % (ref 12.4–15.4)
PH VENOUS: 7.33 (ref 7.35–7.45)
PLATELET # BLD: 201 K/UL (ref 135–450)
PMV BLD AUTO: 9.8 FL (ref 5–10.5)
POTASSIUM SERPL-SCNC: 4.1 MMOL/L (ref 3.5–5.1)
PROTEIN PROTEIN: 10 MG/DL
PROTEIN/CREAT RATIO: 0.1 MG/DL
RBC # BLD: 4.4 M/UL (ref 4–5.2)
SODIUM BLD-SCNC: 137 MMOL/L (ref 136–145)
TOTAL PROTEIN: 7 G/DL (ref 6.4–8.2)
TRIGL SERPL-MCNC: 149 MG/DL (ref 0–150)
URIC ACID, SERUM: 5.5 MG/DL (ref 2.6–6)
VITAMIN D 25-HYDROXY: 25.4 NG/ML
VLDLC SERPL CALC-MCNC: 30 MG/DL
WBC # BLD: 7.9 K/UL (ref 4–11)

## 2020-04-23 PROCEDURE — 96365 THER/PROPH/DIAG IV INF INIT: CPT

## 2020-04-23 PROCEDURE — 2580000003 HC RX 258: Performed by: INTERNAL MEDICINE

## 2020-04-23 PROCEDURE — 82330 ASSAY OF CALCIUM: CPT

## 2020-04-23 PROCEDURE — 83735 ASSAY OF MAGNESIUM: CPT

## 2020-04-23 PROCEDURE — 84156 ASSAY OF PROTEIN URINE: CPT

## 2020-04-23 PROCEDURE — 82570 ASSAY OF URINE CREATININE: CPT

## 2020-04-23 PROCEDURE — 6360000002 HC RX W HCPCS: Performed by: INTERNAL MEDICINE

## 2020-04-23 PROCEDURE — 83970 ASSAY OF PARATHORMONE: CPT

## 2020-04-23 PROCEDURE — 85025 COMPLETE CBC W/AUTO DIFF WBC: CPT

## 2020-04-23 PROCEDURE — 80053 COMPREHEN METABOLIC PANEL: CPT

## 2020-04-23 PROCEDURE — 82306 VITAMIN D 25 HYDROXY: CPT

## 2020-04-23 PROCEDURE — 84550 ASSAY OF BLOOD/URIC ACID: CPT

## 2020-04-23 PROCEDURE — 83036 HEMOGLOBIN GLYCOSYLATED A1C: CPT

## 2020-04-23 PROCEDURE — 80061 LIPID PANEL: CPT

## 2020-04-23 RX ORDER — SODIUM CHLORIDE 0.9 % (FLUSH) 0.9 %
10 SYRINGE (ML) INJECTION PRN
Status: DISCONTINUED | OUTPATIENT
Start: 2020-04-23 | End: 2020-04-24 | Stop reason: HOSPADM

## 2020-04-23 RX ORDER — SODIUM CHLORIDE 0.9 % (FLUSH) 0.9 %
10 SYRINGE (ML) INJECTION PRN
Status: CANCELLED
Start: 2020-05-21

## 2020-04-23 RX ADMIN — DEXTROSE MONOHYDRATE 500 MG: 50 INJECTION, SOLUTION INTRAVENOUS at 11:30

## 2020-04-23 RX ADMIN — Medication 10 ML: at 11:15

## 2020-04-23 RX ADMIN — Medication 10 ML: at 12:02

## 2020-04-23 ASSESSMENT — PAIN SCALES - GENERAL
PAINLEVEL_OUTOF10: 3
PAINLEVEL_OUTOF10: 3

## 2020-04-23 ASSESSMENT — PAIN DESCRIPTION - DESCRIPTORS
DESCRIPTORS: SHARP
DESCRIPTORS: SHARP

## 2020-04-23 ASSESSMENT — PAIN DESCRIPTION - LOCATION
LOCATION: HEAD
LOCATION: HEAD

## 2020-04-23 ASSESSMENT — PAIN DESCRIPTION - ONSET
ONSET: PROGRESSIVE
ONSET: PROGRESSIVE

## 2020-04-23 ASSESSMENT — PAIN DESCRIPTION - PROGRESSION
CLINICAL_PROGRESSION: GRADUALLY WORSENING
CLINICAL_PROGRESSION: NOT CHANGED

## 2020-04-23 ASSESSMENT — PAIN DESCRIPTION - ORIENTATION
ORIENTATION: RIGHT;LEFT
ORIENTATION: RIGHT;LEFT

## 2020-04-23 ASSESSMENT — PAIN - FUNCTIONAL ASSESSMENT: PAIN_FUNCTIONAL_ASSESSMENT: ACTIVITIES ARE NOT PREVENTED

## 2020-04-23 NOTE — PROGRESS NOTES
Outpatient [facility] 1979    Belatacept Visit    NAME:  Grey Gay OF BIRTH:  1980  MEDICAL RECORD NUMBER:  [id number]  DATE:  4/23/2020    Patient arrived to [facility] 58   [] per wheelchair   [x] ambulatory       Diagnosis: kidney transplant pt done 08/30/16. Patient Active Problem List   Diagnosis    Kidney replaced by transplant    Hypercalcemia    High serum parathyroid hormone (PTH)    Hypophosphatemia     Allergies   Allergen Reactions    Dilaudid [Hydromorphone Hcl]     Morphine     Zithromax [Azithromycin]        Patient needs to be seropositive to EBV prior to Belatacept Infusion. Patient is EBV seropositive? Yes. Has patient had TB skin test or Quantiferon Gold TB test recently? No.    Has patient had any vaccines given recently? No.    Has patient been instructed to avoid any live vaccine administrations, such as but not limited to : intranasal influenza, measles, mumps, rubella, oral polio, BCG, yellow fever, varicella, and TY21 typhoid vaccines? Yes. Avoid prolonged sun light, tanning beds or sun lights. Use sun screen. Immunosuppression can increase risk of skin cancers. Patient is aware that Belatacept is an immunosuppressant therapy and can increase risk of developing  infections and possibly other  Malignancies? Yes. Wt 225 lb (102.1 kg)   BMI 39.57 kg/m²     Pre Infusion Checklist       Patient given a copy of the medication guide: yes at start of these infusions. Patient counseled about increased risk for:    Post-Transplant Lymphoproliferative Disorder (PTLD), predominantly involving the  CNS:  Yes. Increased risk of Progressive Multifocal Leukoencephalopathy (PML), a CNS Infection:  Yes. .                                                                                             Over the past month, have you had any new or worsening medical problems such as a new or sudden change in your thinking, memory, speech, mood, behavior, vision, balance, strength, or other problems? No.                         Over the past month, have you had any new or worsening symptoms such as fever, night sweats, tiredness that does not go away, weight loss or swollen glands? No, pt  counseled to report any of these symptoms to physician right away. yes    Patient given Belatacept  500  mg IVPB over 30 min IVPB using a .22 micron filter per protocol. Response to treatment:  Well tolerated by patient. To return in 1 month. Education:    Indicates understanding  No complications or adverse side effects seen. Did c/o of a headache while she was here. Several lab specimens done while here including a urine specimen.   All lab results were sent per epic route to Dr Carmina Regan   Electronically signed by Jovana Valadez RN on 4/23/2020 at 4:21 PM

## 2020-05-19 ENCOUNTER — TELEPHONE (OUTPATIENT)
Dept: INFUSION THERAPY | Age: 40
End: 2020-05-19

## 2020-05-21 ENCOUNTER — HOSPITAL ENCOUNTER (OUTPATIENT)
Dept: INFUSION THERAPY | Age: 40
Setting detail: INFUSION SERIES
Discharge: HOME | End: 2020-05-21
Payer: MEDICARE

## 2020-05-21 VITALS
RESPIRATION RATE: 17 BRPM | HEART RATE: 87 BPM | BODY MASS INDEX: 39.57 KG/M2 | DIASTOLIC BLOOD PRESSURE: 80 MMHG | TEMPERATURE: 98.8 F | SYSTOLIC BLOOD PRESSURE: 130 MMHG | OXYGEN SATURATION: 98 % | WEIGHT: 225 LBS

## 2020-05-21 DIAGNOSIS — Z94.0 KIDNEY REPLACED BY TRANSPLANT: Primary | ICD-10-CM

## 2020-05-21 PROCEDURE — 6360000002 HC RX W HCPCS: Performed by: INTERNAL MEDICINE

## 2020-05-21 PROCEDURE — 2580000003 HC RX 258: Performed by: INTERNAL MEDICINE

## 2020-05-21 PROCEDURE — 96365 THER/PROPH/DIAG IV INF INIT: CPT

## 2020-05-21 RX ORDER — SODIUM CHLORIDE 0.9 % (FLUSH) 0.9 %
10 SYRINGE (ML) INJECTION PRN
Status: CANCELLED
Start: 2020-06-18

## 2020-05-21 RX ORDER — SODIUM CHLORIDE 0.9 % (FLUSH) 0.9 %
10 SYRINGE (ML) INJECTION PRN
Status: DISCONTINUED | OUTPATIENT
Start: 2020-05-21 | End: 2020-05-22 | Stop reason: HOSPADM

## 2020-05-21 RX ADMIN — Medication 10 ML: at 10:47

## 2020-05-21 RX ADMIN — DEXTROSE MONOHYDRATE 500 MG: 50 INJECTION, SOLUTION INTRAVENOUS at 11:27

## 2020-05-21 RX ADMIN — Medication 10 ML: at 11:59

## 2020-05-21 ASSESSMENT — PAIN SCALES - GENERAL: PAINLEVEL_OUTOF10: 0

## 2020-05-21 NOTE — PROGRESS NOTES
Outpatient [id number] [facility]    Belatacept Visit    NAME:  Stephen Root OF BIRTH:  1980  MEDICAL RECORD NUMBER:  [id number]  DATE:  5/21/2020    Patient arrived to [facility] 58   [] per wheelchair   [x] ambulatory       Diagnosis: kidney transplant in 2018. Wearing her masks all the time and she is well aware of the situation. Patient Active Problem List   Diagnosis    Kidney replaced by transplant    Hypercalcemia    High serum parathyroid hormone (PTH)    Hypophosphatemia     Allergies   Allergen Reactions    Dilaudid [Hydromorphone Hcl]     Morphine     Zithromax [Azithromycin]        Patient needs to be seropositive to EBV prior to Belatacept Infusion. Patient is EBV seropositive? yes  Has patient had TB skin test or Quantiferon Gold TB test recently?  no  Has patient had any vaccines given recently? No.    Has patient been instructed to avoid any live vaccine administrations, such as but not limited to : intranasal influenza, measles, mumps, rubella, oral polio, BCG, yellow fever, varicella, and TY21 typhoid vaccines? Yes. Avoid prolonged sun light, tanning beds or sun lights. Use sun screen. Immunosuppression can increase risk of skin cancers. yes    Patient is aware that Belatacept is an immunosuppressant therapy and can increase risk of developing  infections and possibly other  Malignancies? Yes.     /80   Pulse 87   Temp 98.8 °F (37.1 °C) (Oral)   Resp 17   Wt 225 lb (102.1 kg)   SpO2 98%   BMI 39.57 kg/m²     Pre Infusion Checklist       Patient given a copy of the medication guide:         Patient counseled about increased risk for:    Post-Transplant Lymphoproliferative Disorder (PTLD), predominantly involving the  CNS:  Yes. Increased risk of Progressive Multifocal Leukoencephalopathy (PML), a CNS Infection: Yes. Over the past month, have you had any new or worsening medical problems such as a new or sudden change in your thinking, memory, speech, mood, behavior, vision, balance, strength, or other problems? No.                         Over the past month, have you had any new or worsening symptoms such as fever, night sweats, tiredness that does not go away, weight loss or swollen glands? No.                   Patient counseled to report any of these symptoms to physician right away. yes    Patient given Belatacept  500 mg IVPB over 30 min IVPB using a .22 micron filter per protocol. Response to treatment:  Well tolerated by patient. Continuing to do weight loss program.  Education:    Demonstrated understanding. To return in 4 weeks.   Electronically signed by Efren Elliott RN on 5/21/2020 at 4:34 PM

## 2020-06-18 ENCOUNTER — HOSPITAL ENCOUNTER (OUTPATIENT)
Dept: INFUSION THERAPY | Age: 40
Setting detail: INFUSION SERIES
Discharge: HOME | End: 2020-06-18
Payer: MEDICARE

## 2020-06-18 VITALS
DIASTOLIC BLOOD PRESSURE: 94 MMHG | WEIGHT: 228 LBS | SYSTOLIC BLOOD PRESSURE: 132 MMHG | TEMPERATURE: 98.5 F | RESPIRATION RATE: 17 BRPM | HEART RATE: 112 BPM | BODY MASS INDEX: 40.1 KG/M2 | OXYGEN SATURATION: 97 %

## 2020-06-18 DIAGNOSIS — Z94.0 KIDNEY REPLACED BY TRANSPLANT: Primary | ICD-10-CM

## 2020-06-18 PROCEDURE — 2580000003 HC RX 258: Performed by: INTERNAL MEDICINE

## 2020-06-18 PROCEDURE — 6360000002 HC RX W HCPCS: Performed by: INTERNAL MEDICINE

## 2020-06-18 PROCEDURE — 96365 THER/PROPH/DIAG IV INF INIT: CPT

## 2020-06-18 RX ORDER — SODIUM CHLORIDE 0.9 % (FLUSH) 0.9 %
10 SYRINGE (ML) INJECTION PRN
Status: DISCONTINUED | OUTPATIENT
Start: 2020-06-18 | End: 2020-06-19 | Stop reason: HOSPADM

## 2020-06-18 RX ORDER — SODIUM CHLORIDE 0.9 % (FLUSH) 0.9 %
10 SYRINGE (ML) INJECTION PRN
Status: CANCELLED
Start: 2020-07-16

## 2020-06-18 RX ADMIN — DEXTROSE MONOHYDRATE 500 MG: 50 INJECTION, SOLUTION INTRAVENOUS at 10:35

## 2020-06-18 RX ADMIN — Medication 20 ML: at 10:25

## 2020-06-18 RX ADMIN — Medication 20 ML: at 11:07

## 2020-06-18 ASSESSMENT — PAIN SCALES - GENERAL
PAINLEVEL_OUTOF10: 0
PAINLEVEL_OUTOF10: 0

## 2020-06-18 NOTE — PROGRESS NOTES
Outpatient [facility] 1979    Belatacept Visit    NAME:  Velma Marking OF BIRTH:  1980  MEDICAL RECORD NUMBER:  [id number]  DATE:  6/18/2020    Patient arrived to [facility] 58   [] per wheelchair   [x] ambulatory       Diagnosis:     Patient Active Problem List   Diagnosis    Kidney replaced by transplant    Hypercalcemia    High serum parathyroid hormone (PTH)    Hypophosphatemia     Allergies   Allergen Reactions    Dilaudid [Hydromorphone Hcl]     Morphine     Zithromax [Azithromycin]        Pt grieving over the deep loss of her grandma recently. She did get to spend a lot of time with her during that last week or so. She suffered from a stroke. Patient needs to be seropositive to EBV prior to Belatacept Infusion. Patient is EBV seropositive? Yes. Has patient had TB skin test or Quantiferon Gold TB test recently? No.    Has patient had any vaccines given recently? No.    Has patient been instructed to avoid any live vaccine administrations, such as but not limited to : intranasal influenza, measles, mumps, rubella, oral polio, BCG, yellow fever, varicella, and TY21 typhoid vaccines? Yes. Avoid prolonged sun light, tanning beds or sun lights. Use sun screen. Immunosuppression can increase risk of skin cancers. Yes. Patient is aware that Belatacept is an immunosuppressant therapy and can increase risk of developing  infections and possibly other  Malignancies? Yes. Wt 228 lb (103.4 kg)   BMI 40.10 kg/m²     Pre Infusion Checklist       Patient given a copy of the medication guide:  Yes. ...initally. Patient counseled about increased risk for:    Post-Transplant Lymphoproliferative Disorder (PTLD), predominantly involving the  CNS:  Yes. Increased risk of Progressive Multifocal Leukoencephalopathy (PML), a CNS Infection:  Yes. Over the past month, have you had any new or worsening medical problems such as a new or sudden change in your thinking, memory, speech, mood, behavior, vision, balance, strength, or other problems? No.                  Over the past month, have you had any new or worsening symptoms such as fever, night sweats, tiredness that does not go away, weight loss or swollen glands? NO. Patient counseled to report any of these symptoms to physician right away. Yes. Patient given Belatacept  500 mg IVPB in 100 D5 IVPB. over 30 min IVPB using a .22 micron filter per protocol / yes. Response to treatment:  Well tolerated by patient. Education:  Understands infusion and side effects too. To return in 4 weeks for the same infusion.   Electronically signed by Chrystal Moore RN on 6/18/2020 at 2:45 PM

## 2020-07-15 ENCOUNTER — HOSPITAL ENCOUNTER (OUTPATIENT)
Dept: INFUSION THERAPY | Age: 40
Setting detail: INFUSION SERIES
Discharge: HOME | End: 2020-07-15
Payer: MEDICARE

## 2020-07-15 VITALS
BODY MASS INDEX: 39.57 KG/M2 | HEART RATE: 96 BPM | DIASTOLIC BLOOD PRESSURE: 87 MMHG | WEIGHT: 225 LBS | OXYGEN SATURATION: 98 % | TEMPERATURE: 98.2 F | SYSTOLIC BLOOD PRESSURE: 140 MMHG | RESPIRATION RATE: 17 BRPM

## 2020-07-15 DIAGNOSIS — Z94.0 KIDNEY REPLACED BY TRANSPLANT: Primary | ICD-10-CM

## 2020-07-15 PROCEDURE — 96365 THER/PROPH/DIAG IV INF INIT: CPT

## 2020-07-15 PROCEDURE — 6360000002 HC RX W HCPCS: Performed by: INTERNAL MEDICINE

## 2020-07-15 PROCEDURE — 2580000003 HC RX 258: Performed by: INTERNAL MEDICINE

## 2020-07-15 RX ORDER — SODIUM CHLORIDE 0.9 % (FLUSH) 0.9 %
10 SYRINGE (ML) INJECTION PRN
Status: DISCONTINUED | OUTPATIENT
Start: 2020-07-15 | End: 2020-07-16 | Stop reason: HOSPADM

## 2020-07-15 RX ORDER — SODIUM CHLORIDE 0.9 % (FLUSH) 0.9 %
10 SYRINGE (ML) INJECTION PRN
Status: CANCELLED
Start: 2020-08-12

## 2020-07-15 RX ADMIN — DEXTROSE MONOHYDRATE 500 MG: 50 INJECTION, SOLUTION INTRAVENOUS at 10:25

## 2020-07-15 RX ADMIN — Medication 20 ML: at 11:01

## 2020-07-15 RX ADMIN — Medication 20 ML: at 10:20

## 2020-07-15 ASSESSMENT — PAIN SCALES - GENERAL
PAINLEVEL_OUTOF10: 0
PAINLEVEL_OUTOF10: 0

## 2020-07-15 NOTE — PROGRESS NOTES
Outpatient [id number] [facility]    Belatacept Visit    NAME:  Blanquita Soto OF BIRTH:  1980  MEDICAL RECORD NUMBER:  [id number]  DATE:  7/15/2020    Patient arrived to [facility] 58   [] per wheelchair   [x] ambulatory       Diagnosis: kidney transplant pt. Patient Active Problem List   Diagnosis    Kidney replaced by transplant    Hypercalcemia    High serum parathyroid hormone (PTH)    Hypophosphatemia     Allergies   Allergen Reactions    Dilaudid [Hydromorphone Hcl]     Morphine     Zithromax [Azithromycin]        Patient needs to be seropositive to EBV prior to Belatacept Infusion. Patient is EBV seropositive? Yes. Has patient had TB skin test or Quantiferon Gold TB test recently? No.    Has patient had any vaccines given recently? No.    Has patient been instructed to avoid any live vaccine administrations, such as but not limited to : intranasal influenza, measles, mumps, rubella, oral polio, BCG, yellow fever, varicella, and TY21 typhoid vaccines? Yes. Avoid prolonged sun light, tanning beds or sun lights. Use sun screen. Immunosuppression can increase risk of skin cancers. Yes but is highly enjoying her swimming pool this year. Patient is aware that Belatacept is an immunosuppressant therapy and can increase risk of developing  infections and possibly other  Malignancies? yes     Wt 225 lb (102.1 kg)   BMI 39.57 kg/m²     Pre Infusion Checklist       Patient given a copy of the medication guide:  No   counseled about increased risk for:    Post-Transplant Lymphoproliferative Disorder (PTLD), predominantly involving the  CNS:  Yes, in the start of therapy . Increased risk of Progressive Multifocal Leukoencephalopathy (PML), a CNS Infection:  Yes.                                                                                                    Over the past month, have you had any new or worsening medical problems such as a new or sudden change in your thinking, memory, speech, mood, behavior, vision, balance, strength, or other problems? No.                       Over the past month, have you had any new or worsening symptoms such as fever, night sweats, tiredness that does not go away, weight loss or swollen glands? No.                   Patient counseled to report any of these symptoms to physician right away. Yes. Patient given Belatacept 500  mg IVPB over 30 min IVPB using a .22 micron filter per protocol. To return in 4 weeks on August 13th 2020. Response to treatment:  Well tolerated by patient. Education:    Indicates understanding  In good spirits.   Electronically signed by Didier Truong RN on 7/15/2020 at 5:45 PM

## 2020-07-16 ENCOUNTER — HOSPITAL ENCOUNTER (OUTPATIENT)
Dept: INFUSION THERAPY | Age: 40
End: 2020-07-16

## 2020-08-13 ENCOUNTER — HOSPITAL ENCOUNTER (OUTPATIENT)
Dept: INFUSION THERAPY | Age: 40
Setting detail: INFUSION SERIES
Discharge: HOME | End: 2020-08-13
Payer: MEDICARE

## 2020-08-14 ENCOUNTER — HOSPITAL ENCOUNTER (OUTPATIENT)
Dept: INFUSION THERAPY | Age: 40
Setting detail: INFUSION SERIES
Discharge: HOME | End: 2020-08-14
Payer: MEDICARE

## 2020-08-14 VITALS
HEART RATE: 81 BPM | BODY MASS INDEX: 39.39 KG/M2 | OXYGEN SATURATION: 99 % | DIASTOLIC BLOOD PRESSURE: 87 MMHG | TEMPERATURE: 98 F | WEIGHT: 224 LBS | RESPIRATION RATE: 17 BRPM | SYSTOLIC BLOOD PRESSURE: 127 MMHG

## 2020-08-14 DIAGNOSIS — Z94.0 KIDNEY REPLACED BY TRANSPLANT: Primary | ICD-10-CM

## 2020-08-14 PROCEDURE — 96365 THER/PROPH/DIAG IV INF INIT: CPT

## 2020-08-14 PROCEDURE — 6360000002 HC RX W HCPCS: Performed by: INTERNAL MEDICINE

## 2020-08-14 PROCEDURE — 2580000003 HC RX 258: Performed by: INTERNAL MEDICINE

## 2020-08-14 RX ORDER — AMITRIPTYLINE HYDROCHLORIDE 25 MG/1
25 TABLET, FILM COATED ORAL NIGHTLY
COMMUNITY

## 2020-08-14 RX ORDER — SODIUM CHLORIDE 0.9 % (FLUSH) 0.9 %
10 SYRINGE (ML) INJECTION PRN
Status: CANCELLED
Start: 2020-09-11

## 2020-08-14 RX ORDER — LEVOCETIRIZINE DIHYDROCHLORIDE 5 MG/1
5 TABLET, FILM COATED ORAL NIGHTLY
COMMUNITY

## 2020-08-14 RX ORDER — SODIUM CHLORIDE 0.9 % (FLUSH) 0.9 %
10 SYRINGE (ML) INJECTION PRN
Status: DISCONTINUED | OUTPATIENT
Start: 2020-08-14 | End: 2020-08-15 | Stop reason: HOSPADM

## 2020-08-14 RX ORDER — CITALOPRAM 10 MG/1
10 TABLET ORAL NIGHTLY
COMMUNITY

## 2020-08-14 RX ADMIN — Medication 10 ML: at 10:53

## 2020-08-14 RX ADMIN — Medication 10 ML: at 10:20

## 2020-08-14 RX ADMIN — DEXTROSE MONOHYDRATE 500 MG: 50 INJECTION, SOLUTION INTRAVENOUS at 10:21

## 2020-08-14 ASSESSMENT — PAIN SCALES - GENERAL: PAINLEVEL_OUTOF10: 0

## 2020-08-14 NOTE — PROGRESS NOTES
Outpatient [id number] [facility]    Belatacept Visit    NAME:  Vianey Ott OF BIRTH:  1980  MEDICAL RECORD NUMBER:  [id number]  DATE:  8/14/2020    Patient arrived to [facility] 58   [] per wheelchair   [x] ambulatory       Diagnosis:  Kidney transplant / 08/30/2016. Patient Active Problem List   Diagnosis    Kidney replaced by transplant    Hypercalcemia    High serum parathyroid hormone (PTH)    Hypophosphatemia     Allergies   Allergen Reactions    Dilaudid [Hydromorphone Hcl]     Morphine     Zithromax [Azithromycin]      Patient needs to be seropositive to EBV prior to Belatacept Infusion. Patient is EBV seropositive? Yes.  has patient had TB skin test or Quantiferon Gold TB test recently? In 2016. Negative results. TB  Has patient had any vaccines given recently? No.  Has patient been instructed to avoid any live vaccine administrations, such as but not limited to : intranasal influenza, measles, mumps, rubella, oral polio, BCG, yellow fever, varicella, and TY21 typhoid vaccines? Yes. Avoid prolonged sun light, tanning beds or sun lights. Use sun screen. Immunosuppression can increase risk of skin cancers. Yes. Patient is aware that Belatacept is an immunosuppressant therapy and can increase risk of developing  infections and possibly other  Malignancies? Yes.     /87   Pulse 81   Temp 98 °F (36.7 °C) (Oral)   Resp 17   Wt 224 lb (101.6 kg)   SpO2 99%   BMI 39.39 kg/m²     Pre Infusion Checklist       Patient given a copy of the medication guide: no.     Patient counseled about increased risk for:    Post-Transplant Lymphoproliferative Disorder (PTLD), predominantly involving the  CNS:  Yes. Increased risk of Progressive Multifocal Leukoencephalopathy (PML), a CNS Infection:  Yes.                                                                                                   Over the past month, have you had any new or worsening medical problems such as a new or sudden change in your thinking, memory, speech, mood, behavior, vision, balance, strength, or other problems? no                   Over the past month, have you had any new or worsening symptoms such as fever, night sweats, tiredness that does not go away, weight loss or swollen glands? No.                   Patient counseled to report any of these symptoms to physician right away. yes    Patient given Belatacept 500 mg IVPB over 30 min IVPB using a .22 micron filter per protocol. Yes. Response to treatment:  Well tolerated by patient. Education:    Indicates understanding  Upset over her friend who had just passed and is grieving this loss, ventilated her feelings and then felt a little better. Also still grieving over her grandma too this year. Allowed quiet time with feet elevated and a warm blanket and was trying to nap while infusing. AVS signed but not taken home. To return 9/10/20 at 10;00 am for a repeat dose of the same infusion.   Electronically signed by Rich Hoffman RN on 8/14/2020 at 4:00 PM

## 2020-09-10 ENCOUNTER — HOSPITAL ENCOUNTER (OUTPATIENT)
Dept: INFUSION THERAPY | Age: 40
Setting detail: INFUSION SERIES
Discharge: HOME | End: 2020-09-10
Payer: MEDICARE

## 2020-09-10 VITALS
BODY MASS INDEX: 39.39 KG/M2 | HEART RATE: 95 BPM | DIASTOLIC BLOOD PRESSURE: 79 MMHG | SYSTOLIC BLOOD PRESSURE: 138 MMHG | RESPIRATION RATE: 17 BRPM | TEMPERATURE: 98.9 F | OXYGEN SATURATION: 97 % | WEIGHT: 224 LBS

## 2020-09-10 DIAGNOSIS — Z94.0 KIDNEY REPLACED BY TRANSPLANT: Primary | ICD-10-CM

## 2020-09-10 PROCEDURE — 6360000002 HC RX W HCPCS: Performed by: INTERNAL MEDICINE

## 2020-09-10 PROCEDURE — 96365 THER/PROPH/DIAG IV INF INIT: CPT

## 2020-09-10 PROCEDURE — 2580000003 HC RX 258: Performed by: INTERNAL MEDICINE

## 2020-09-10 RX ORDER — SODIUM CHLORIDE 0.9 % (FLUSH) 0.9 %
10 SYRINGE (ML) INJECTION PRN
Status: DISCONTINUED | OUTPATIENT
Start: 2020-09-10 | End: 2020-09-11 | Stop reason: HOSPADM

## 2020-09-10 RX ORDER — SODIUM CHLORIDE 0.9 % (FLUSH) 0.9 %
10 SYRINGE (ML) INJECTION PRN
Status: CANCELLED
Start: 2020-10-08

## 2020-09-10 RX ADMIN — Medication 10 ML: at 11:06

## 2020-09-10 RX ADMIN — Medication 10 ML: at 10:24

## 2020-09-10 RX ADMIN — DEXTROSE MONOHYDRATE 500 MG: 50 INJECTION, SOLUTION INTRAVENOUS at 10:26

## 2020-09-10 ASSESSMENT — PAIN SCALES - GENERAL: PAINLEVEL_OUTOF10: 0

## 2020-09-10 NOTE — PROGRESS NOTES
Outpatient [id number] [facility]    Belatacept Visit    NAME:  Carolynn Briscoe OF BIRTH:  1980  MEDICAL RECORD NUMBER:  [id number]  DATE:  9/10/2020    Patient arrived to [facility] 58   [] per wheelchair   [x] ambulatory       Diagnosis: kidney transplant    Patient Active Problem List   Diagnosis    Kidney replaced by transplant    Hypercalcemia    High serum parathyroid hormone (PTH)    Hypophosphatemia     Allergies   Allergen Reactions    Dilaudid [Hydromorphone Hcl]     Morphine     Zithromax [Azithromycin]        Patient needs to be seropositive to EBV prior to Belatacept Infusion. Patient is EBV seropositive? Yes    Has patient had TB skin test or Quantiferon Gold TB test recently? Not since 2016    Has patient had any vaccines given recently? No    Has patient been instructed to avoid any live vaccine administrations, such as but not limited to : intranasal influenza, measles, mumps, rubella, oral polio, BCG, yellow fever, varicella, and TY21 typhoid vaccines? Yes    Avoid prolonged sun light, tanning beds or sun lights. Use sun screen. Immunosuppression can increase risk of skin cancers. Patient is aware that Belatacept is an immunosuppressant therapy and can increase risk of developing  infections and possibly other  Malignancies?   Yes     /79   Pulse 95   Temp 98.9 °F (37.2 °C) (Oral)   Resp 17   Wt 224 lb (101.6 kg)   SpO2 97%   BMI 39.39 kg/m²     Pre Infusion Checklist       Patient given a copy of the medication guide:  Yes       Patient counseled about increased risk for:    Post-Transplant Lymphoproliferative Disorder (PTLD), predominantly involving the  CNS:  Yes                                                  Increased risk of Progressive Multifocal Leukoencephalopathy (PML), a CNS Infection:  Yes                                                                                                   Over the past month, have you had any new or worsening medical problems such as a new or sudden change in your thinking, memory, speech, mood, behavior, vision, balance, strength, or other problems? No                         Over the past month, have you had any new or worsening symptoms such as fever, night sweats, tiredness that does not go away, weight loss or swollen glands? No                   Patient counseled to report any of these symptoms to physician right away. Patient given Belatacept 500 mg IVPB over 30 min IVPB using a .22 micron filter per protocol. Response to treatment:  Well tolerated by patient.     Education:    Verbalized understanding       Electronically signed by Clemencia Almaguer RN on 9/10/2020 at 11:12 AM

## 2020-10-07 ENCOUNTER — HOSPITAL ENCOUNTER (OUTPATIENT)
Dept: INFUSION THERAPY | Age: 40
Setting detail: INFUSION SERIES
Discharge: HOME | End: 2020-10-07
Payer: MEDICARE

## 2020-10-07 VITALS
HEART RATE: 89 BPM | TEMPERATURE: 98.5 F | RESPIRATION RATE: 16 BRPM | SYSTOLIC BLOOD PRESSURE: 130 MMHG | BODY MASS INDEX: 40.1 KG/M2 | DIASTOLIC BLOOD PRESSURE: 79 MMHG | OXYGEN SATURATION: 99 % | WEIGHT: 228 LBS

## 2020-10-07 DIAGNOSIS — Z94.0 KIDNEY REPLACED BY TRANSPLANT: Primary | ICD-10-CM

## 2020-10-07 PROCEDURE — 2580000003 HC RX 258: Performed by: INTERNAL MEDICINE

## 2020-10-07 PROCEDURE — 96365 THER/PROPH/DIAG IV INF INIT: CPT

## 2020-10-07 PROCEDURE — 6360000002 HC RX W HCPCS: Performed by: INTERNAL MEDICINE

## 2020-10-07 RX ORDER — SODIUM CHLORIDE 0.9 % (FLUSH) 0.9 %
10 SYRINGE (ML) INJECTION PRN
Status: CANCELLED
Start: 2020-11-04

## 2020-10-07 RX ORDER — SODIUM CHLORIDE 0.9 % (FLUSH) 0.9 %
10 SYRINGE (ML) INJECTION PRN
Status: DISCONTINUED | OUTPATIENT
Start: 2020-10-07 | End: 2020-10-08 | Stop reason: HOSPADM

## 2020-10-07 RX ADMIN — Medication 10 ML: at 11:55

## 2020-10-07 RX ADMIN — Medication 10 ML: at 10:19

## 2020-10-07 RX ADMIN — DEXTROSE MONOHYDRATE 500 MG: 50 INJECTION, SOLUTION INTRAVENOUS at 11:12

## 2020-10-07 RX ADMIN — Medication 10 ML: at 11:10

## 2020-10-07 NOTE — PROGRESS NOTES
Outpatient [id number] [facility]    Belatacept Visit    NAME:  Yury Jacques OF BIRTH:  1980  MEDICAL RECORD NUMBER:  [id number]  DATE:  10/7/2020    Patient arrived to [facility] 58   [] per wheelchair   [x] ambulatory     Alert and oriented X 4. Denies any side effects from last infusion. No new s/s of rejection. Denies new s/s of infection or increase in respiratory distress. Afebrile. Wearing face mask to prevent the spread of COVID-19      Diagnosis: Kidney transplant    Patient Active Problem List   Diagnosis    Kidney replaced by transplant    Hypercalcemia    High serum parathyroid hormone (PTH)    Hypophosphatemia     Allergies   Allergen Reactions    Dilaudid [Hydromorphone Hcl]     Morphine     Zithromax [Azithromycin]        Patient needs to be seropositive to EBV prior to Belatacept Infusion. Patient is EBV seropositive? Yes    Has patient had TB skin test or Quantiferon Gold TB test recently? No last one 2016    Has patient had any vaccines given recently? No    Has patient been instructed to avoid any live vaccine administrations, such as but not limited to : intranasal influenza, measles, mumps, rubella, oral polio, BCG, yellow fever, varicella, and TY21 typhoid vaccines? Yes    Avoid prolonged sun light, tanning beds or sun lights. Use sun screen. Immunosuppression can increase risk of skin cancers. Yes    Patient is aware that Belatacept is an immunosuppressant therapy and can increase risk of developing  infections and possibly other  Malignancies?   Yes     /79   Pulse 89   Temp 98.5 °F (36.9 °C) (Oral)   Resp 16   Wt 228 lb (103.4 kg)   SpO2 99%   BMI 40.10 kg/m²     Pre Infusion Checklist       Patient given a copy of the medication guide:  Yes previously       Patient counseled about increased risk for:    Post-Transplant Lymphoproliferative Disorder (PTLD), predominantly involving the  CNS:  Yes Increased risk of Progressive Multifocal Leukoencephalopathy (PML), a CNS Infection:  Yes                                                                                                   Over the past month, have you had any new or worsening medical problems such as a new or sudden change in your thinking, memory, speech, mood, behavior, vision, balance, strength, or other problems? No                         Over the past month, have you had any new or worsening symptoms such as fever, night sweats, tiredness that does not go away, weight loss or swollen glands? No                   Patient counseled to report any of these symptoms to physician right away. Patient given Belatacept 500 mg IVPB over 30 min IVPB using a .22 micron filter per protocol. Response to treatment:  Well tolerated by patient.     Education:    Verbalized understanding       Electronically signed by Kayleen Castrejon RN on 10/7/2020 at 10:08 AM

## 2020-11-03 ENCOUNTER — HOSPITAL ENCOUNTER (OUTPATIENT)
Dept: INFUSION THERAPY | Age: 40
Setting detail: INFUSION SERIES
Discharge: HOME | End: 2020-11-03
Payer: MEDICARE

## 2020-11-03 VITALS
OXYGEN SATURATION: 97 % | BODY MASS INDEX: 40.1 KG/M2 | SYSTOLIC BLOOD PRESSURE: 129 MMHG | DIASTOLIC BLOOD PRESSURE: 87 MMHG | HEART RATE: 76 BPM | RESPIRATION RATE: 17 BRPM | WEIGHT: 228 LBS | TEMPERATURE: 98.4 F

## 2020-11-03 DIAGNOSIS — Z94.0 KIDNEY REPLACED BY TRANSPLANT: Primary | ICD-10-CM

## 2020-11-03 PROCEDURE — 2580000003 HC RX 258: Performed by: INTERNAL MEDICINE

## 2020-11-03 PROCEDURE — 6360000002 HC RX W HCPCS: Performed by: INTERNAL MEDICINE

## 2020-11-03 PROCEDURE — 96365 THER/PROPH/DIAG IV INF INIT: CPT

## 2020-11-03 RX ORDER — SODIUM CHLORIDE 0.9 % (FLUSH) 0.9 %
10 SYRINGE (ML) INJECTION PRN
Status: CANCELLED
Start: 2020-12-01

## 2020-11-03 RX ORDER — SODIUM CHLORIDE 0.9 % (FLUSH) 0.9 %
10 SYRINGE (ML) INJECTION PRN
Status: DISCONTINUED | OUTPATIENT
Start: 2020-11-03 | End: 2020-11-04 | Stop reason: HOSPADM

## 2020-11-03 RX ADMIN — Medication 10 ML: at 11:29

## 2020-11-03 RX ADMIN — Medication 10 ML: at 12:07

## 2020-11-03 RX ADMIN — DEXTROSE MONOHYDRATE 500 MG: 50 INJECTION, SOLUTION INTRAVENOUS at 11:30

## 2020-11-03 ASSESSMENT — PAIN SCALES - GENERAL: PAINLEVEL_OUTOF10: 0

## 2020-11-03 NOTE — PROGRESS NOTES
Outpatient [id number] [facility]    Belatacept Visit    NAME:  Yury Jacques OF BIRTH:  1980  MEDICAL RECORD NUMBER:  [id number]  DATE:  11/3/2020    Patient arrived to [facility] 58   [] per wheelchair   [x] ambulatory       Diagnosis: Kidney Transplant    Patient Active Problem List   Diagnosis    Kidney replaced by transplant    Hypercalcemia    High serum parathyroid hormone (PTH)    Hypophosphatemia     Allergies   Allergen Reactions    Dilaudid [Hydromorphone Hcl]     Morphine     Zithromax [Azithromycin]        Patient needs to be seropositive to EBV prior to Belatacept Infusion. Patient is EBV seropositive? Yes    Has patient had TB skin test or Quantiferon Gold TB test recently? No in 2016 and was negative  Has patient had any vaccines given recently? Yes, flu shot 2 weeks ago    Has patient been instructed to avoid any live vaccine administrations, such as but not limited to : intranasal influenza, measles, mumps, rubella, oral polio, BCG, yellow fever, varicella, and TY21 typhoid vaccines? Yes    Avoid prolonged sun light, tanning beds or sun lights. Use sun screen. Immunosuppression can increase risk of skin cancers. Patient is aware that Belatacept is an immunosuppressant therapy and can increase risk of developing  infections and possibly other  Malignancies?   Yes     /87   Pulse 76   Temp 98.4 °F (36.9 °C) (Oral)   Resp 17   Wt 228 lb (103.4 kg)   SpO2 97%   BMI 40.10 kg/m²     Pre Infusion Checklist       Patient given a copy of the medication guide:  Yes       Patient counseled about increased risk for:    Post-Transplant Lymphoproliferative Disorder (PTLD), predominantly involving the  CNS:  Yes                                                  Increased risk of Progressive Multifocal Leukoencephalopathy (PML), a CNS Infection:  Yes Over the past month, have you had any new or worsening medical problems such as a new or sudden change in your thinking, memory, speech, mood, behavior, vision, balance, strength, or other problems? No                         Over the past month, have you had any new or worsening symptoms such as fever, night sweats, tiredness that does not go away, weight loss or swollen glands? Yes but is in menopause right now. Patient counseled to report any of these symptoms to physician right away. Patient given Belatacept 500 mg IVPB over 30 min IVPB using a .22 micron filter per protocol. Response to treatment:  Well tolerated by patient.     Education:    Verbalized understanding and printed AVS       Electronically signed by Didier Ambrosio RN on 11/3/2020 at 11:49 AM

## 2020-11-04 ENCOUNTER — APPOINTMENT (OUTPATIENT)
Dept: INFUSION THERAPY | Age: 40
End: 2020-11-04
Payer: MEDICARE

## 2020-12-02 ENCOUNTER — HOSPITAL ENCOUNTER (OUTPATIENT)
Dept: INFUSION THERAPY | Age: 40
Setting detail: INFUSION SERIES
Discharge: HOME | End: 2020-12-02
Payer: MEDICARE

## 2020-12-02 VITALS
DIASTOLIC BLOOD PRESSURE: 88 MMHG | OXYGEN SATURATION: 97 % | TEMPERATURE: 97.5 F | RESPIRATION RATE: 17 BRPM | HEIGHT: 63 IN | HEART RATE: 91 BPM | SYSTOLIC BLOOD PRESSURE: 151 MMHG | WEIGHT: 232 LBS | BODY MASS INDEX: 41.11 KG/M2

## 2020-12-02 DIAGNOSIS — Z94.0 KIDNEY REPLACED BY TRANSPLANT: Primary | ICD-10-CM

## 2020-12-02 PROCEDURE — 96365 THER/PROPH/DIAG IV INF INIT: CPT

## 2020-12-02 PROCEDURE — 6360000002 HC RX W HCPCS: Performed by: INTERNAL MEDICINE

## 2020-12-02 PROCEDURE — 2580000003 HC RX 258: Performed by: INTERNAL MEDICINE

## 2020-12-02 RX ORDER — SODIUM CHLORIDE 0.9 % (FLUSH) 0.9 %
10 SYRINGE (ML) INJECTION PRN
Status: DISCONTINUED | OUTPATIENT
Start: 2020-12-02 | End: 2020-12-03 | Stop reason: HOSPADM

## 2020-12-02 RX ORDER — SODIUM CHLORIDE 0.9 % (FLUSH) 0.9 %
10 SYRINGE (ML) INJECTION PRN
Status: CANCELLED
Start: 2020-12-30

## 2020-12-02 RX ADMIN — Medication 10 ML: at 10:23

## 2020-12-02 RX ADMIN — DEXTROSE MONOHYDRATE 500 MG: 50 INJECTION, SOLUTION INTRAVENOUS at 10:23

## 2020-12-02 RX ADMIN — Medication 10 ML: at 11:15

## 2020-12-02 NOTE — PROGRESS NOTES
Outpatient [facility] 1979    Belatacept Visit    NAME:  Maria Antonia Elise OF BIRTH:  1980  MEDICAL RECORD NUMBER:  [id number]  DATE:  12/2/2020    Patient arrived to [facility] 58   [] per wheelchair   [x] ambulatory       Diagnosis: kidney transplant    Patient Active Problem List   Diagnosis    Kidney replaced by transplant    Hypercalcemia    High serum parathyroid hormone (PTH)    Hypophosphatemia     Allergies   Allergen Reactions    Dilaudid [Hydromorphone Hcl]     Morphine     Zithromax [Azithromycin]        Patient needs to be seropositive to EBV prior to Belatacept Infusion. Patient is EBV seropositive? Yes    Has patient had TB skin test or Quantiferon Gold TB test recently? Yes 2016    Has patient had any vaccines given recently? Yes Flu    Has patient been instructed to avoid any live vaccine administrations, such as but not limited to : intranasal influenza, measles, mumps, rubella, oral polio, BCG, yellow fever, varicella, and TY21 typhoid vaccines? Yes    Avoid prolonged sun light, tanning beds or sun lights. Use sun screen. Immunosuppression can increase risk of skin cancers. Patient is aware that Belatacept is an immunosuppressant therapy and can increase risk of developing  infections and possibly other  Malignancies?   Yes     BP (!) 151/88   Pulse 91   Temp 97.5 °F (36.4 °C) (Oral)   Resp 17   Ht 5' 3\" (1.6 m)   Wt 232 lb (105.2 kg)   SpO2 97%   BMI 41.10 kg/m²     Pre Infusion Checklist       Patient given a copy of the medication guide:  Yes       Patient counseled about increased risk for:    Post-Transplant Lymphoproliferative Disorder (PTLD), predominantly involving the  CNS:  Yes                                                  Increased risk of Progressive Multifocal Leukoencephalopathy (PML), a CNS Infection:  Yes                                                                                                   Over the past month, have you had any new or worsening medical problems such as a new or sudden change in your thinking, memory, speech, mood, behavior, vision, balance, strength, or other problems? No                         Over the past month, have you had any new or worsening symptoms such as fever, night sweats, tiredness that does not go away, weight loss or swollen glands? No                   Patient counseled to report any of these symptoms to physician right away. Patient given Belatacept 5 mg IVPB over 30 min IVPB using a .22 micron filter per protocol. Response to treatment:  Well tolerated by patient.     Education:    Indicates understanding       Electronically signed by Nile Rahman RN on 12/2/2020 at 10:32 AM

## 2020-12-28 ENCOUNTER — TELEPHONE (OUTPATIENT)
Dept: INFUSION THERAPY | Age: 40
End: 2020-12-28

## 2020-12-28 NOTE — TELEPHONE ENCOUNTER
Patient called to let us know she has tested positive for COVID and she was scheduled for her Belatacept infusion this week. States she is checking with nephrologist to see how long she will have to wait to get her infusion and she will let us know.

## 2020-12-30 ENCOUNTER — HOSPITAL ENCOUNTER (OUTPATIENT)
Dept: INFUSION THERAPY | Age: 40
Setting detail: INFUSION SERIES
End: 2020-12-30
Payer: MEDICARE

## 2021-01-05 ENCOUNTER — HOSPITAL ENCOUNTER (OUTPATIENT)
Dept: INFUSION THERAPY | Age: 41
Setting detail: INFUSION SERIES
Discharge: HOME | End: 2021-01-05
Payer: MEDICARE

## 2021-01-05 VITALS
HEIGHT: 63 IN | WEIGHT: 235 LBS | RESPIRATION RATE: 18 BRPM | BODY MASS INDEX: 41.64 KG/M2 | TEMPERATURE: 98.4 F | HEART RATE: 91 BPM | DIASTOLIC BLOOD PRESSURE: 85 MMHG | SYSTOLIC BLOOD PRESSURE: 139 MMHG

## 2021-01-05 DIAGNOSIS — Z94.0 KIDNEY REPLACED BY TRANSPLANT: Primary | ICD-10-CM

## 2021-01-05 PROCEDURE — 96365 THER/PROPH/DIAG IV INF INIT: CPT

## 2021-01-05 PROCEDURE — 2580000003 HC RX 258: Performed by: INTERNAL MEDICINE

## 2021-01-05 PROCEDURE — 6360000002 HC RX W HCPCS: Performed by: INTERNAL MEDICINE

## 2021-01-05 RX ORDER — SODIUM CHLORIDE 0.9 % (FLUSH) 0.9 %
10 SYRINGE (ML) INJECTION PRN
Status: DISCONTINUED | OUTPATIENT
Start: 2021-01-05 | End: 2021-01-06 | Stop reason: HOSPADM

## 2021-01-05 RX ORDER — SODIUM CHLORIDE 0.9 % (FLUSH) 0.9 %
10 SYRINGE (ML) INJECTION PRN
Status: CANCELLED
Start: 2021-01-26

## 2021-01-05 RX ORDER — EMPAGLIFLOZIN 10 MG/1
10 TABLET, FILM COATED ORAL DAILY
COMMUNITY
End: 2021-10-14

## 2021-01-05 RX ADMIN — Medication 10 ML: at 10:53

## 2021-01-05 RX ADMIN — DEXTROSE MONOHYDRATE 500 MG: 50 INJECTION, SOLUTION INTRAVENOUS at 10:21

## 2021-01-05 RX ADMIN — Medication 10 ML: at 10:15

## 2021-01-05 NOTE — PROGRESS NOTES
Outpatient [id number] [facility]    Belatacept Visit    NAME:  Brittny Cartagena OF BIRTH:  1980  MEDICAL RECORD NUMBER:  [id number]  DATE:  1/5/2021    Patient arrived to [facility] 58   [] per wheelchair   [x] ambulatory       Was positive for COVID on 12/26/2020, whole family had it. Put off belatacept for 1 week. Feeling better but still has a little cough and feels tired. Diagnosis: Kidney transplant 8/30/2016    Patient Active Problem List   Diagnosis    Kidney replaced by transplant    Hypercalcemia    High serum parathyroid hormone (PTH)    Hypophosphatemia     Allergies   Allergen Reactions    Dilaudid [Hydromorphone Hcl]     Morphine     Zithromax [Azithromycin]        Patient needs to be seropositive to EBV prior to Belatacept Infusion. Patient is EBV seropositive? Yes    Has patient had TB skin test or Quantiferon Gold TB test recently? No    Has patient had any vaccines given recently? No    Has patient been instructed to avoid any live vaccine administrations, such as but not limited to : intranasal influenza, measles, mumps, rubella, oral polio, BCG, yellow fever, varicella, and TY21 typhoid vaccines? Yes    Avoid prolonged sun light, tanning beds or sun lights. Use sun screen. Immunosuppression can increase risk of skin cancers. Patient is aware that Belatacept is an immunosuppressant therapy and can increase risk of developing  infections and possibly other  Malignancies?   Yes     Ht 5' 3\" (1.6 m)   Wt 235 lb (106.6 kg)   BMI 41.63 kg/m²     Pre Infusion Checklist       Patient given a copy of the medication guide:  Yes       Patient counseled about increased risk for:    Post-Transplant Lymphoproliferative Disorder (PTLD), predominantly involving the  CNS:  Yes                                                  Increased risk of Progressive Multifocal Leukoencephalopathy (PML), a CNS Infection:  Yes Over the past month, have you had any new or worsening medical problems such as a new or sudden change in your thinking, memory, speech, mood, behavior, vision, balance, strength, or other problems? No                         Over the past month, have you had any new or worsening symptoms such as fever, night sweats, tiredness that does not go away, weight loss or swollen glands? No                   Patient counseled to report any of these symptoms to physician right away. Patient given Belatacept 500 mg IVPB over 30 min IVPB using a .22 micron filter per protocol. Response to treatment:  Well tolerated by patient.     Education:    Indicates understanding       Electronically signed by Lillian Mejia RN on 1/5/2021 at 10:29 AM

## 2021-02-03 ENCOUNTER — HOSPITAL ENCOUNTER (OUTPATIENT)
Dept: INFUSION THERAPY | Age: 41
Setting detail: INFUSION SERIES
End: 2021-02-03
Payer: MEDICARE

## 2021-02-05 ENCOUNTER — HOSPITAL ENCOUNTER (OUTPATIENT)
Dept: INFUSION THERAPY | Age: 41
Setting detail: INFUSION SERIES
Discharge: HOME | End: 2021-02-05
Payer: MEDICARE

## 2021-02-05 VITALS
BODY MASS INDEX: 42.87 KG/M2 | OXYGEN SATURATION: 97 % | DIASTOLIC BLOOD PRESSURE: 85 MMHG | HEART RATE: 90 BPM | RESPIRATION RATE: 18 BRPM | WEIGHT: 242 LBS | SYSTOLIC BLOOD PRESSURE: 136 MMHG | TEMPERATURE: 96.6 F

## 2021-02-05 DIAGNOSIS — Z94.0 KIDNEY REPLACED BY TRANSPLANT: Primary | ICD-10-CM

## 2021-02-05 PROCEDURE — 96365 THER/PROPH/DIAG IV INF INIT: CPT

## 2021-02-05 PROCEDURE — 2580000003 HC RX 258: Performed by: INTERNAL MEDICINE

## 2021-02-05 PROCEDURE — 6360000002 HC RX W HCPCS: Performed by: INTERNAL MEDICINE

## 2021-02-05 RX ORDER — SODIUM CHLORIDE 0.9 % (FLUSH) 0.9 %
10 SYRINGE (ML) INJECTION PRN
Status: CANCELLED
Start: 2021-03-05

## 2021-02-05 RX ORDER — SODIUM CHLORIDE 0.9 % (FLUSH) 0.9 %
10 SYRINGE (ML) INJECTION PRN
Status: DISCONTINUED | OUTPATIENT
Start: 2021-02-05 | End: 2021-02-06 | Stop reason: HOSPADM

## 2021-02-05 RX ADMIN — Medication 10 ML: at 09:50

## 2021-02-05 RX ADMIN — Medication 10 ML: at 10:40

## 2021-02-05 RX ADMIN — DEXTROSE MONOHYDRATE 500 MG: 50 INJECTION, SOLUTION INTRAVENOUS at 10:08

## 2021-02-05 ASSESSMENT — PAIN SCALES - GENERAL: PAINLEVEL_OUTOF10: 0

## 2021-03-03 ENCOUNTER — HOSPITAL ENCOUNTER (OUTPATIENT)
Dept: INFUSION THERAPY | Age: 41
Setting detail: INFUSION SERIES
Discharge: HOME | End: 2021-03-03
Payer: MEDICARE

## 2021-03-03 VITALS
RESPIRATION RATE: 18 BRPM | OXYGEN SATURATION: 97 % | HEART RATE: 93 BPM | BODY MASS INDEX: 42.51 KG/M2 | WEIGHT: 240 LBS | SYSTOLIC BLOOD PRESSURE: 142 MMHG | TEMPERATURE: 96.9 F | DIASTOLIC BLOOD PRESSURE: 88 MMHG

## 2021-03-03 DIAGNOSIS — Z94.0 KIDNEY REPLACED BY TRANSPLANT: Primary | ICD-10-CM

## 2021-03-03 PROCEDURE — 96365 THER/PROPH/DIAG IV INF INIT: CPT

## 2021-03-03 PROCEDURE — 2580000003 HC RX 258: Performed by: INTERNAL MEDICINE

## 2021-03-03 PROCEDURE — 6360000002 HC RX W HCPCS: Performed by: INTERNAL MEDICINE

## 2021-03-03 RX ORDER — SODIUM CHLORIDE 0.9 % (FLUSH) 0.9 %
10 SYRINGE (ML) INJECTION PRN
Status: DISCONTINUED | OUTPATIENT
Start: 2021-03-03 | End: 2021-03-04 | Stop reason: HOSPADM

## 2021-03-03 RX ORDER — SODIUM CHLORIDE 0.9 % (FLUSH) 0.9 %
10 SYRINGE (ML) INJECTION PRN
Status: CANCELLED
Start: 2021-03-31

## 2021-03-03 RX ADMIN — DEXTROSE MONOHYDRATE 500 MG: 50 INJECTION, SOLUTION INTRAVENOUS at 10:21

## 2021-03-03 RX ADMIN — Medication 20 ML: at 10:53

## 2021-03-03 RX ADMIN — Medication 10 ML: at 10:15

## 2021-03-03 ASSESSMENT — PAIN SCALES - GENERAL: PAINLEVEL_OUTOF10: 0

## 2021-03-03 NOTE — PROGRESS NOTES
Outpatient [address] [facility]    Belatacept Visit    NAME:  Riley Duran OF BIRTH:  1980  MEDICAL RECORD NUMBER:  [id number]  DATE:  3/3/2021    Patient arrived to [facility] 58   [] per wheelchair   [x] ambulatory       Diagnosis: post op kidney transplant pt. Patient Active Problem List   Diagnosis    Kidney replaced by transplant    Hypercalcemia    High serum parathyroid hormone (PTH)    Hypophosphatemia     Allergies   Allergen Reactions    Dilaudid [Hydromorphone Hcl]     Morphine     Zithromax [Azithromycin]        Patient needs to be seropositive to EBV prior to Belatacept Infusion. Patient is EBV seropositive? yes    Has patient had TB skin test or Quantiferon Gold TB test recently? No but doctor aware of this. Test done in 2016 and no longer requested per MD.   Has patient had any vaccines given recently? No.     Has patient been instructed to avoid any live vaccine administrations, such as but not limited to : intranasal influenza, measles, mumps, rubella, oral polio, BCG, yellow fever, varicella, and TY21 typhoid vaccines? Yes. Avoid prolonged sun light, tanning beds or sun lights. Use sun screen. Immunosuppression can increase risk of skin cancers. yes    Patient is aware that Belatacept is an immunosuppressant therapy and can increase risk of developing  infections and possibly other  Malignancies? yes     BP (!) 142/88   Pulse 93   Temp 96.9 °F (36.1 °C) (Infrared)   Resp 18   Wt 240 lb (108.9 kg)   SpO2 97%   BMI 42.51 kg/m²     Pre Infusion Checklist       Patient given a copy of the medication guide:  Yes at the start of her infusions here. Patient counseled about increased risk for:    Post-Transplant Lymphoproliferative Disorder (PTLD), predominantly involving the  CNS:  yes                                              Increased risk of Progressive Multifocal leukoencephalopathy (PML), a CNS Infection:  Yes. Over the past month, have you had any new or worsening medical problems such as a new or sudden change in your thinking, memory, speech, mood, behavior, vision, balance, strength, or other problems? No.                     Over the past month, have you had any new or worsening symptoms such as fever, night sweats, tiredness that does not go away, weight loss or swollen glands? No                   Patient counseled to report any of these symptoms to physician right away. Yes. Patient given Belatacept 500 mgs  IVPB over 30 min IVPB using a .22 micron filter per protocol. Response to treatment:  Well tolerated by patient. Education:    Indicates understanding  Scheduled to return on 03/31/21 for the same infusion.   Electronically signed by Diony Garzon RN on 3/3/2021 at 11:46 AM

## 2021-03-31 ENCOUNTER — HOSPITAL ENCOUNTER (OUTPATIENT)
Dept: INFUSION THERAPY | Age: 41
Setting detail: INFUSION SERIES
Discharge: HOME | End: 2021-03-31
Payer: MEDICARE

## 2021-03-31 VITALS
RESPIRATION RATE: 18 BRPM | OXYGEN SATURATION: 97 % | TEMPERATURE: 96.6 F | HEART RATE: 90 BPM | SYSTOLIC BLOOD PRESSURE: 133 MMHG | DIASTOLIC BLOOD PRESSURE: 80 MMHG | WEIGHT: 240 LBS | BODY MASS INDEX: 42.51 KG/M2

## 2021-03-31 DIAGNOSIS — Z94.0 KIDNEY REPLACED BY TRANSPLANT: Primary | ICD-10-CM

## 2021-03-31 PROCEDURE — 96365 THER/PROPH/DIAG IV INF INIT: CPT

## 2021-03-31 PROCEDURE — 6360000002 HC RX W HCPCS: Performed by: INTERNAL MEDICINE

## 2021-03-31 PROCEDURE — 2580000003 HC RX 258: Performed by: INTERNAL MEDICINE

## 2021-03-31 RX ORDER — SODIUM CHLORIDE 0.9 % (FLUSH) 0.9 %
10 SYRINGE (ML) INJECTION PRN
Status: DISCONTINUED | OUTPATIENT
Start: 2021-03-31 | End: 2021-04-01 | Stop reason: HOSPADM

## 2021-03-31 RX ORDER — SODIUM CHLORIDE 0.9 % (FLUSH) 0.9 %
10 SYRINGE (ML) INJECTION PRN
Status: CANCELLED
Start: 2021-04-28

## 2021-03-31 RX ADMIN — Medication 20 ML: at 09:45

## 2021-03-31 RX ADMIN — DEXTROSE MONOHYDRATE 500 MG: 50 INJECTION, SOLUTION INTRAVENOUS at 10:04

## 2021-03-31 RX ADMIN — Medication 20 ML: at 10:38

## 2021-03-31 ASSESSMENT — PAIN SCALES - GENERAL: PAINLEVEL_OUTOF10: 0

## 2021-03-31 NOTE — PROGRESS NOTES
Outpatient [id number] [facility]    Belatacept Visit    NAME:  Jurgen Bal OF BIRTH:  1980  MEDICAL RECORD NUMBER:  [id number]  DATE:  3/31/2021    Patient arrived to [facility] 58   [] per wheelchair   [x] ambulatory       Diagnosis:     Patient Active Problem List   Diagnosis    Kidney replaced by transplant    Hypercalcemia    High serum parathyroid hormone (PTH)    Hypophosphatemia     Allergies   Allergen Reactions    Dilaudid [Hydromorphone Hcl]     Morphine     Zithromax [Azithromycin]    went to dermatologists and had large mole removed under her right arm, very red and tender so I placed a soft 2 x 2 in 1/2 and with a soft bandaid with her consent because it was rubbing on her clothes. Patient needs to be seropositive to EBV prior to Belatacept Infusion. Patient is EBV seropositive?  yes    Has patient had TB skin test or Quantiferon Gold TB test recently? 2016 and negative. Has an order that she does not need it anymore. Has patient had any vaccines given recently? 1 covid vaccine done. Has patient been instructed to avoid any live vaccine. No. administrations, such as but not limited to : intranasal influenza, measles, mumps, rubella, oral polio, BCG, yellow fever, varicella, and TY21 typhoid vaccines? No.    Avoid prolonged sun light, tanning beds or sun lights. Use sun screen. Immunosuppression can increase risk of skin cancers, went on a [place] trip with parents for 5 days. Patient is aware that Belatacept is an immunosuppressant therapy and can increase risk of developing  infections and possibly other  Malignancies? Yes. Wt 240 lb (108.9 kg)   BMI 42.51 kg/m²     Pre Infusion Checklist       Patient given a copy of the medication guide: at start of the infusion therapy. Patient counseled about increased risk for:    Post-Transplant Lymphoproliferative Disorder (PTLD), predominantly involving the  CNS: yes. Increased risk of Progressive Multifocal Leukoencephalopathy (PML), a CNS Infection: yes. Over the past month, have you had any new or worsening medical problems such as a new or sudden change in your thinking, memory, speech, mood, behavior, vision, balance, strength, or other problems? No.                  Over the past month, have you had any new or worsening symptoms such as fever, night sweats, tiredness that does not go away, weight loss or swollen glands? No.                   Patient counseled to report any of these symptoms to physician right away. Yes. Patient given Belatacept  500 mg IVPB over 30 min IVPB using a .22 micron filter per protocol. Response to treatment:  Infusion tolerated well but states that she always needs to take a long nap when she gets home. Site unremarkable and always needs a # 24 gauge needle due to tiny peripheral veins. Education:  Understands all of the teaching about the medication and is to return in 4 weeks. Very pleasant and in great spirits, she's looking forward to her daughter's Senior play , prom and her graduation.   Electronically signed by Toby Santoyo RN on 3/31/2021 at 11:08 AM

## 2021-04-26 ENCOUNTER — HOSPITAL ENCOUNTER (OUTPATIENT)
Dept: INFUSION THERAPY | Age: 41
Setting detail: INFUSION SERIES
Discharge: HOME | End: 2021-04-26
Payer: MEDICARE

## 2021-04-26 VITALS
RESPIRATION RATE: 18 BRPM | WEIGHT: 241.84 LBS | HEART RATE: 92 BPM | DIASTOLIC BLOOD PRESSURE: 78 MMHG | SYSTOLIC BLOOD PRESSURE: 128 MMHG | HEIGHT: 63 IN | OXYGEN SATURATION: 98 % | BODY MASS INDEX: 42.85 KG/M2 | TEMPERATURE: 98.3 F

## 2021-04-26 DIAGNOSIS — Z94.0 KIDNEY REPLACED BY TRANSPLANT: Primary | ICD-10-CM

## 2021-04-26 PROCEDURE — 2580000003 HC RX 258: Performed by: INTERNAL MEDICINE

## 2021-04-26 PROCEDURE — 6360000002 HC RX W HCPCS: Performed by: INTERNAL MEDICINE

## 2021-04-26 PROCEDURE — 96365 THER/PROPH/DIAG IV INF INIT: CPT

## 2021-04-26 RX ORDER — IMIQUIMOD 12.5 MG/.25G
1 CREAM TOPICAL
COMMUNITY
End: 2022-08-15

## 2021-04-26 RX ORDER — SODIUM CHLORIDE 0.9 % (FLUSH) 0.9 %
10 SYRINGE (ML) INJECTION PRN
Status: DISCONTINUED | OUTPATIENT
Start: 2021-04-26 | End: 2021-04-27 | Stop reason: HOSPADM

## 2021-04-26 RX ORDER — SODIUM CHLORIDE 0.9 % (FLUSH) 0.9 %
10 SYRINGE (ML) INJECTION PRN
Status: CANCELLED
Start: 2021-05-24

## 2021-04-26 RX ADMIN — Medication 10 ML: at 10:27

## 2021-04-26 RX ADMIN — DEXTROSE MONOHYDRATE 500 MG: 50 INJECTION, SOLUTION INTRAVENOUS at 10:36

## 2021-04-26 RX ADMIN — Medication 10 ML: at 11:11

## 2021-04-26 RX ADMIN — Medication 10 ML: at 10:36

## 2021-04-26 RX ADMIN — Medication 10 ML: at 11:30

## 2021-04-26 ASSESSMENT — PAIN SCALES - GENERAL
PAINLEVEL_OUTOF10: 0
PAINLEVEL_OUTOF10: 0

## 2021-04-26 NOTE — PROGRESS NOTES
Outpatient [id number] [facility]    Belatacept Visit    NAME:  Torrie Jarquin OF BIRTH:  1980  MEDICAL RECORD NUMBER:  [id number]  DATE:  4/26/2021    Patient arrived to [facility] 58   [] per wheelchair   [x] ambulatory   Patient alert and orient x 4. Reports that she has been feeling fairly well other than have some sciatica pain intermittently. Patient states that she will be having surgery for this as well as hemorrhoid removal in the next month or so. Patient aware to call if the surgeries are close to her next Belatacept infusions. Diagnosis: Kidney transplant - August 30, 2016    Patient Active Problem List   Diagnosis    Kidney replaced by transplant    Hypercalcemia    High serum parathyroid hormone (PTH)    Hypophosphatemia     Allergies   Allergen Reactions    Dilaudid [Hydromorphone Hcl]     Morphine     Vancomycin Itching    Zithromax [Azithromycin]        Patient needs to be seropositive to EBV prior to Belatacept Infusion. Patient is EBV seropositive? Yes    Has patient had TB skin test or Quantiferon Gold TB test recently? No - does not need to have done per Dr Myrna Healy    Has patient had any vaccines given recently? Yes - Has had both COVID-19 vaccines on 3/31/2021 and 4/21/2021    Has patient been instructed to avoid any live vaccine administrations, such as but not limited to : intranasal influenza, measles, mumps, rubella, oral polio, BCG, yellow fever, varicella, and TY21 typhoid vaccines? Yes - patient verbalized understanding    Avoid prolonged sun light, tanning beds or sun lights. Use sun screen. Immunosuppression can increase risk of skin cancers. - reviewed with patient and she verbalized understanding    Patient is aware that Belatacept is an immunosuppressant therapy and can increase risk of developing  infections and possibly other  Malignancies?   Yes - Patient verbalized understanding     /78   Pulse 92   Temp 98.3 °F (36.8 °C) (Oral)   Resp 18   Ht 5' 3\" (1.6 m)   Wt 241 lb 13.5 oz (109.7 kg)   SpO2 98%   BMI 42.84 kg/m²     Pre Infusion Checklist       Patient given a copy of the medication guide:  Yes - given and previous visit and information given today via AVS       Patient counseled about increased risk for:    Post-Transplant Lymphoproliferative Disorder (PTLD), predominantly involving the  CNS:  Yes - patient verbalized understanding                                                  Increased risk of Progressive Multifocal Leukoencephalopathy (PML), a CNS Infection:  Yes - patient verbalized understanding                                                                                                   Over the past month, have you had any new or worsening medical problems such as a new or sudden change in your thinking, memory, speech, mood, behavior, vision, balance, strength, or other problems? No - patient denies all of these symptoms                        Over the past month, have you had any new or worsening symptoms such as fever, night sweats, tiredness that does not go away, weight loss or swollen glands? No - patient denies all of these symptoms                   Patient counseled to report any of these symptoms to physician right away. - YES. Patient given Belatacept 500 mg IVPB over 35 min IVPB using a .22 micron filter per protocol. Response to treatment:  Well tolerated by patient. Education: Verbal and written discharge instructions reviewed with patient. Verbalized understanding.  Written copy given via AVS    Patient scheduled for next infusions on May 26, 2021 and June 23, 2021    Electronically signed by Troy Pedraza RN on 4/26/2021 at 11:58 AM

## 2021-05-26 ENCOUNTER — HOSPITAL ENCOUNTER (OUTPATIENT)
Dept: INFUSION THERAPY | Age: 41
Setting detail: INFUSION SERIES
End: 2021-05-26
Payer: MEDICARE

## 2021-05-27 ENCOUNTER — HOSPITAL ENCOUNTER (OUTPATIENT)
Dept: INFUSION THERAPY | Age: 41
Setting detail: INFUSION SERIES
Discharge: HOME | End: 2021-05-27
Payer: MEDICARE

## 2021-05-27 VITALS — WEIGHT: 240.08 LBS | HEIGHT: 63 IN | BODY MASS INDEX: 42.54 KG/M2

## 2021-05-27 DIAGNOSIS — Z94.0 KIDNEY REPLACED BY TRANSPLANT: Primary | ICD-10-CM

## 2021-05-27 PROCEDURE — 6360000002 HC RX W HCPCS: Performed by: INTERNAL MEDICINE

## 2021-05-27 PROCEDURE — 96365 THER/PROPH/DIAG IV INF INIT: CPT

## 2021-05-27 PROCEDURE — 2580000003 HC RX 258: Performed by: INTERNAL MEDICINE

## 2021-05-27 RX ORDER — SODIUM CHLORIDE 0.9 % (FLUSH) 0.9 %
10 SYRINGE (ML) INJECTION PRN
Status: DISCONTINUED | OUTPATIENT
Start: 2021-05-27 | End: 2021-05-28 | Stop reason: HOSPADM

## 2021-05-27 RX ORDER — SODIUM CHLORIDE 0.9 % (FLUSH) 0.9 %
10 SYRINGE (ML) INJECTION PRN
Status: CANCELLED
Start: 2021-06-24

## 2021-05-27 RX ADMIN — Medication 10 ML: at 10:15

## 2021-05-27 RX ADMIN — Medication 10 ML: at 10:53

## 2021-05-27 RX ADMIN — DEXTROSE MONOHYDRATE 500 MG: 50 INJECTION, SOLUTION INTRAVENOUS at 10:15

## 2021-05-27 NOTE — PROGRESS NOTES
Outpatient [id number] [facility]    Belatacept Visit    NAME:  Kitty Iqbal OF BIRTH:  1980  MEDICAL RECORD NUMBER:  [id number]  DATE:  5/27/2021    Patient arrived to [facility] 58   [] per wheelchair   [x] ambulatory       Diagnosis: Kidney transplant    Patient had hemhroidectomy    Patient Active Problem List   Diagnosis    Kidney replaced by transplant    Hypercalcemia    High serum parathyroid hormone (PTH)    Hypophosphatemia     Allergies   Allergen Reactions    Dilaudid [Hydromorphone Hcl]     Morphine     Vancomycin Itching    Zithromax [Azithromycin]        Patient needs to be seropositive to EBV prior to Belatacept Infusion. Patient is EBV seropositive? Yes    Has patient had TB skin test or Quantiferon Gold TB test recently? No Does not need. Has patient had any vaccines given recently? Yes covid    Has patient been instructed to avoid any live vaccine administrations, such as but not limited to : intranasal influenza, measles, mumps, rubella, oral polio, BCG, yellow fever, varicella, and TY21 typhoid vaccines? Yes    Avoid prolonged sun light, tanning beds or sun lights. Use sun screen. Immunosuppression can increase risk of skin cancers. Patient is aware that Belatacept is an immunosuppressant therapy and can increase risk of developing  infections and possibly other  Malignancies?   Yes     Ht 5' 3\" (1.6 m)   Wt 240 lb 1.3 oz (108.9 kg)   BMI 42.53 kg/m²     Pre Infusion Checklist       Patient given a copy of the medication guide:  Yes       Patient counseled about increased risk for:    Post-Transplant Lymphoproliferative Disorder (PTLD), predominantly involving the  CNS:  Yes                                                  Increased risk of Progressive Multifocal Leukoencephalopathy (PML), a CNS Infection:  Yes                                                                                                   Over the past month, have you had any new or worsening medical problems such as a new or sudden change in your thinking, memory, speech, mood, behavior, vision, balance, strength, or other problems? No                         Over the past month, have you had any new or worsening symptoms such as fever, night sweats, tiredness that does not go away, weight loss or swollen glands? No                   Patient counseled to report any of these symptoms to physician right away. Patient given Belatacept 500 mg IVPB over 30 min IVPB using a .22 micron filter per protocol. Response to treatment:  Well tolerated by patient.     Education:    Indicates understanding       Electronically signed by Jose Moreno RN on 5/27/2021 at 10:21 AM

## 2021-06-23 ENCOUNTER — HOSPITAL ENCOUNTER (OUTPATIENT)
Dept: INFUSION THERAPY | Age: 41
Setting detail: INFUSION SERIES
Discharge: HOME | End: 2021-06-23
Payer: MEDICARE

## 2021-06-23 VITALS
BODY MASS INDEX: 41.99 KG/M2 | HEART RATE: 100 BPM | DIASTOLIC BLOOD PRESSURE: 69 MMHG | RESPIRATION RATE: 18 BRPM | SYSTOLIC BLOOD PRESSURE: 129 MMHG | HEIGHT: 63 IN | TEMPERATURE: 99 F | OXYGEN SATURATION: 97 % | WEIGHT: 237 LBS

## 2021-06-23 DIAGNOSIS — Z94.0 KIDNEY REPLACED BY TRANSPLANT: Primary | ICD-10-CM

## 2021-06-23 PROCEDURE — 6360000002 HC RX W HCPCS: Performed by: INTERNAL MEDICINE

## 2021-06-23 PROCEDURE — 2580000003 HC RX 258: Performed by: INTERNAL MEDICINE

## 2021-06-23 PROCEDURE — 96365 THER/PROPH/DIAG IV INF INIT: CPT

## 2021-06-23 RX ORDER — SODIUM CHLORIDE 0.9 % (FLUSH) 0.9 %
10 SYRINGE (ML) INJECTION PRN
Status: DISCONTINUED | OUTPATIENT
Start: 2021-06-23 | End: 2021-06-24 | Stop reason: HOSPADM

## 2021-06-23 RX ORDER — SODIUM CHLORIDE 0.9 % (FLUSH) 0.9 %
10 SYRINGE (ML) INJECTION PRN
Status: CANCELLED
Start: 2021-07-21

## 2021-06-23 RX ADMIN — Medication 10 ML: at 11:07

## 2021-06-23 RX ADMIN — Medication 10 ML: at 11:10

## 2021-06-23 RX ADMIN — DEXTROSE MONOHYDRATE 500 MG: 50 INJECTION, SOLUTION INTRAVENOUS at 10:21

## 2021-06-23 RX ADMIN — Medication 10 ML: at 10:27

## 2021-06-23 RX ADMIN — Medication 10 ML: at 10:04

## 2021-06-23 NOTE — PROGRESS NOTES
Outpatient [id number] [facility]    Belatacept Visit    NAME:  Riley Duran OF BIRTH:  1980  MEDICAL RECORD NUMBER:  [id number]  DATE:  6/23/2021    Patient arrived to [facility] 58   [] per wheelchair   [x] ambulatory   Patient A/O X4. No complaint of pain, no fever. Diagnosis: Kidney transplant    Patient Active Problem List   Diagnosis    Kidney replaced by transplant    Hypercalcemia    High serum parathyroid hormone (PTH)    Hypophosphatemia     Allergies   Allergen Reactions    Dilaudid [Hydromorphone Hcl]     Morphine     Vancomycin Itching    Zithromax [Azithromycin]        Patient needs to be seropositive to EBV prior to Belatacept Infusion. Patient is EBV seropositive? Yes    Has patient had TB skin test or Quantiferon Gold TB test recently? No, does not need to have per Dr. Maxim Jung. Has patient had any vaccines given recently? Yes, patient had both COVID-19 vaccines on Mar 31, 2021 and April 21, 2021. Has patient been instructed to avoid any live vaccine administrations, such as but not limited to : intranasal influenza, measles, mumps, rubella, oral polio, BCG, yellow fever, varicella, and TY21 typhoid vaccines? Yes    Avoid prolonged sun light, tanning beds or sun lights. Use sun screen. Immunosuppression can increase risk of skin cancers. Patient is aware that Belatacept is an immunosuppressant therapy and can increase risk of developing  infections and possibly other  Malignancies?   Yes     Pulse 100   Temp 99 °F (37.2 °C) (Oral)   Resp 18   Ht 5' 3\" (1.6 m)   Wt 237 lb (107.5 kg)   SpO2 97%   BMI 41.98 kg/m²     Pre Infusion Checklist       Patient given a copy of the medication guide:  Yes, given in previous visit and information given today via AVS.       Patient counseled about increased risk for:    Post-Transplant Lymphoproliferative Disorder (PTLD), predominantly involving the  CNS:  Yes Increased risk of Progressive Multifocal Leukoencephalopathy (PML), a CNS Infection:  Yes                                                                                                   Over the past month, have you had any new or worsening medical problems such as a new or sudden change in your thinking, memory, speech, mood, behavior, vision, balance, strength, or other problems? No                         Over the past month, have you had any new or worsening symptoms such as fever, night sweats, tiredness that does not go away, weight loss or swollen glands? No, patient denied all these symptoms. Patient counseled to report any of these symptoms to physician right away. Patient given Belatacept 500 mg IVPB over 30 min IVPB using a .22 micron filter per protocol. Response to treatment:  Well tolerated by patient.     Education:    Verbalized understanding       Electronically signed by Benjamín Deluna RN on 6/23/2021 at 10:36 AM

## 2021-07-22 ENCOUNTER — HOSPITAL ENCOUNTER (OUTPATIENT)
Dept: INFUSION THERAPY | Age: 41
Setting detail: INFUSION SERIES
End: 2021-07-22
Payer: MEDICARE

## 2021-07-23 ENCOUNTER — HOSPITAL ENCOUNTER (OUTPATIENT)
Dept: INFUSION THERAPY | Age: 41
Setting detail: INFUSION SERIES
Discharge: HOME | End: 2021-07-23
Payer: MEDICARE

## 2021-07-23 VITALS
HEART RATE: 81 BPM | SYSTOLIC BLOOD PRESSURE: 135 MMHG | BODY MASS INDEX: 41.79 KG/M2 | WEIGHT: 235.89 LBS | TEMPERATURE: 98.2 F | OXYGEN SATURATION: 98 % | RESPIRATION RATE: 16 BRPM | DIASTOLIC BLOOD PRESSURE: 79 MMHG

## 2021-07-23 DIAGNOSIS — Z94.0 KIDNEY REPLACED BY TRANSPLANT: Primary | ICD-10-CM

## 2021-07-23 PROCEDURE — 96365 THER/PROPH/DIAG IV INF INIT: CPT

## 2021-07-23 PROCEDURE — 2580000003 HC RX 258: Performed by: INTERNAL MEDICINE

## 2021-07-23 PROCEDURE — 6360000002 HC RX W HCPCS: Performed by: INTERNAL MEDICINE

## 2021-07-23 RX ORDER — SODIUM CHLORIDE 0.9 % (FLUSH) 0.9 %
10 SYRINGE (ML) INJECTION PRN
Status: DISCONTINUED | OUTPATIENT
Start: 2021-07-23 | End: 2021-07-24 | Stop reason: HOSPADM

## 2021-07-23 RX ORDER — SODIUM CHLORIDE 0.9 % (FLUSH) 0.9 %
10 SYRINGE (ML) INJECTION PRN
Status: CANCELLED
Start: 2021-08-20

## 2021-07-23 RX ADMIN — Medication 10 ML: at 10:52

## 2021-07-23 RX ADMIN — DEXTROSE MONOHYDRATE 500 MG: 50 INJECTION, SOLUTION INTRAVENOUS at 10:18

## 2021-07-23 RX ADMIN — Medication 10 ML: at 10:10

## 2021-07-23 RX ADMIN — Medication 10 ML: at 10:18

## 2021-07-23 RX ADMIN — Medication 20 ML: at 11:00

## 2021-07-23 ASSESSMENT — PAIN SCALES - GENERAL
PAINLEVEL_OUTOF10: 0
PAINLEVEL_OUTOF10: 0

## 2021-07-23 NOTE — PROGRESS NOTES
Outpatient [id number] [facility]    Belatacept Visit    NAME:  Ernestina Jorge OF BIRTH:  1980  MEDICAL RECORD NUMBER:  [id number]  DATE:  7/23/2021    Patient arrived to [facility] 58   [] per wheelchair   [x] ambulatory   Alert and oriented x 4. Patient reports that she has been feeling well other than having more frequent migraines. Patient feels that this is being caused by the poor air quality index. Patient states that taking her Theotis Axe usually relieves her headache. Diagnosis: Kidnet transplant - August 30, 2016. Patient Active Problem List   Diagnosis    Kidney replaced by transplant    Hypercalcemia    High serum parathyroid hormone (PTH)    Hypophosphatemia     Allergies   Allergen Reactions    Dilaudid [Hydromorphone Hcl]     Morphine     Vancomycin Itching    Zithromax [Azithromycin]        Patient needs to be seropositive to EBV prior to Belatacept Infusion. Patient is EBV seropositive? Yes    Has patient had TB skin test or Quantiferon Gold TB test recently? No - does not need to have done per Dr Jeet Rosa. Has patient had any vaccines given recently? Had both Covid-19 vaccines (3/31/2021 and 4/21/2021)    Has patient been instructed to avoid any live vaccine administrations, such as but not limited to : intranasal influenza, measles, mumps, rubella, oral polio, BCG, yellow fever, varicella, and TY21 typhoid vaccines? Yes - reviewed and patient verbalized understanding    Avoid prolonged sun light, tanning beds or sun lights. Use sun screen. Immunosuppression can increase risk of skin cancers. - reviewed with patient and she verbalized understanding    Patient is aware that Belatacept is an immunosuppressant therapy and can increase risk of developing  infections and possibly other  Malignancies?   Yes - reviewed with patient and she verbalized understanding     /79   Pulse 81   Temp 98.2 °F (36.8 °C) (Oral)   Resp 16   Wt 235 lb 14.3 oz (107 kg)   SpO2 98%   BMI 41.79 kg/m²     Pre Infusion Checklist       Patient given a copy of the medication guide:  Yes - given at previuos visit and given via AVS at today's visit       Patient counseled about increased risk for:    Post-Transplant Lymphoproliferative Disorder (PTLD), predominantly involving the  CNS:  Yes - Reviewed and patient verbalized understanding                                                 Increased risk of Progressive Multifocal Leukoencephalopathy (PML), a CNS Infection: Yes - Reviewed and patient verbalized understanding                                                                                                                                    Over the past month, have you had any new or worsening medical problems such as a new or sudden change in your thinking, memory, speech, mood, behavior, vision, balance, strength, or other problems? No - patient denies these symptoms                       Over the past month, have you had any new or worsening symptoms such as fever, night sweats, tiredness that does not go away, weight loss or swollen glands? No - patient denies these symptoms                   Patient counseled to report any of these symptoms to physician right away. YES - patient verbalized understanding    Patient given Belatacept 500 mg IVPB over 30 min IVPB using a .22 micron filter per protocol. Response to treatment:  Well tolerated by patient. Education:Verbal and written discharge instructions reviewed with patient. Verbalized understanding.  Written copy given via AVS      Next Belatacept Infusion scheduled for August 19, 2021     Electronically signed by Markel Browne RN on 7/23/2021 at 6:45 PM

## 2021-08-20 ENCOUNTER — HOSPITAL ENCOUNTER (OUTPATIENT)
Dept: INFUSION THERAPY | Age: 41
Setting detail: INFUSION SERIES
Discharge: HOME | End: 2021-08-20
Payer: MEDICARE

## 2021-08-20 VITALS
TEMPERATURE: 98.3 F | SYSTOLIC BLOOD PRESSURE: 134 MMHG | BODY MASS INDEX: 42.29 KG/M2 | OXYGEN SATURATION: 97 % | DIASTOLIC BLOOD PRESSURE: 85 MMHG | HEART RATE: 97 BPM | WEIGHT: 238.76 LBS | RESPIRATION RATE: 18 BRPM

## 2021-08-20 DIAGNOSIS — Z94.0 KIDNEY REPLACED BY TRANSPLANT: Primary | ICD-10-CM

## 2021-08-20 PROCEDURE — 6360000002 HC RX W HCPCS: Performed by: INTERNAL MEDICINE

## 2021-08-20 PROCEDURE — 2580000003 HC RX 258: Performed by: INTERNAL MEDICINE

## 2021-08-20 PROCEDURE — 96365 THER/PROPH/DIAG IV INF INIT: CPT

## 2021-08-20 RX ORDER — SODIUM CHLORIDE 0.9 % (FLUSH) 0.9 %
10 SYRINGE (ML) INJECTION PRN
Status: CANCELLED
Start: 2021-09-17

## 2021-08-20 RX ORDER — ELETRIPTAN HYDROBROMIDE 20 MG/1
20 TABLET, FILM COATED ORAL
COMMUNITY
End: 2022-08-15

## 2021-08-20 RX ORDER — SODIUM CHLORIDE 0.9 % (FLUSH) 0.9 %
10 SYRINGE (ML) INJECTION PRN
Status: DISCONTINUED | OUTPATIENT
Start: 2021-08-20 | End: 2021-08-21 | Stop reason: HOSPADM

## 2021-08-20 RX ADMIN — Medication 10 ML: at 10:25

## 2021-08-20 RX ADMIN — DEXTROSE MONOHYDRATE 500 MG: 50 INJECTION, SOLUTION INTRAVENOUS at 10:34

## 2021-08-20 RX ADMIN — Medication 10 ML: at 11:10

## 2021-08-20 NOTE — PROGRESS NOTES
Outpatient [id number] [facility]    Belatacept Visit    NAME:  Saturnino Cargo OF BIRTH:  1980  MEDICAL RECORD NUMBER:  [id number]  DATE:  8/20/2021    Patient arrived to [facility] 58   [] per wheelchair   [x] ambulatory       Diagnosis: 8/30/2016 Kidney Transplant    Patient Active Problem List   Diagnosis    Kidney replaced by transplant    Hypercalcemia    High serum parathyroid hormone (PTH)    Hypophosphatemia     Allergies   Allergen Reactions    Dilaudid [Hydromorphone Hcl]     Morphine     Vancomycin Itching    Zithromax [Azithromycin]        Patient needs to be seropositive to EBV prior to Belatacept Infusion. Patient is EBV seropositive? Yes    Has patient had TB skin test or Quantiferon Gold TB test recently? No    Has patient had any vaccines given recently? Not since Covid Vaccines 3/31/21 and 4/21/21    Has patient been instructed to avoid any live vaccine administrations, such as but not limited to : intranasal influenza, measles, mumps, rubella, oral polio, BCG, yellow fever, varicella, and TY21 typhoid vaccines? Yes    Avoid prolonged sun light, tanning beds or sun lights. Use sun screen. Immunosuppression can increase risk of skin cancers. Patient is aware that Belatacept is an immunosuppressant therapy and can increase risk of developing  infections and possibly other  Malignancies?   Yes     /85   Pulse 97   Temp 98.3 °F (36.8 °C) (Oral)   Resp 18   Wt 237 lb (107.5 kg)   SpO2 97%   BMI 41.98 kg/m²     Pre Infusion Checklist       Patient given a copy of the medication guide:  Yes       Patient counseled about increased risk for:    Post-Transplant Lymphoproliferative Disorder (PTLD), predominantly involving the  CNS:  Yes                                                  Increased risk of Progressive Multifocal Leukoencephalopathy (PML), a CNS Infection:  Yes Over the past month, have you had any new or worsening medical problems such as a new or sudden change in your thinking, memory, speech, mood, behavior, vision, balance, strength, or other problems? No                         Over the past month, have you had any new or worsening symptoms such as fever, night sweats, tiredness that does not go away, weight loss or swollen glands? No                   Patient counseled to report any of these symptoms to physician right away. Patient given Belatacept 500 mg IVPB over 30 min IVPB using a .22 micron filter per protocol. Response to treatment:  Well tolerated by patient.     Education:    Indicates understanding    Next appointment with Dr. Rodrigo Sharma is 10/12/21       Electronically signed by Joey Rodríguez RN on 8/20/2021 at 1:32 PM.

## 2021-09-16 ENCOUNTER — HOSPITAL ENCOUNTER (OUTPATIENT)
Dept: INFUSION THERAPY | Age: 41
Setting detail: INFUSION SERIES
Discharge: HOME | End: 2021-09-16
Payer: MEDICARE

## 2021-09-16 VITALS
DIASTOLIC BLOOD PRESSURE: 96 MMHG | RESPIRATION RATE: 17 BRPM | HEART RATE: 92 BPM | SYSTOLIC BLOOD PRESSURE: 148 MMHG | WEIGHT: 239.64 LBS | OXYGEN SATURATION: 100 % | HEIGHT: 63 IN | TEMPERATURE: 98.2 F | BODY MASS INDEX: 42.46 KG/M2

## 2021-09-16 DIAGNOSIS — Z94.0 KIDNEY REPLACED BY TRANSPLANT: Primary | ICD-10-CM

## 2021-09-16 PROCEDURE — 6360000002 HC RX W HCPCS: Performed by: INTERNAL MEDICINE

## 2021-09-16 PROCEDURE — 2580000003 HC RX 258: Performed by: INTERNAL MEDICINE

## 2021-09-16 PROCEDURE — 96365 THER/PROPH/DIAG IV INF INIT: CPT

## 2021-09-16 RX ORDER — SODIUM CHLORIDE 0.9 % (FLUSH) 0.9 %
10 SYRINGE (ML) INJECTION PRN
Status: CANCELLED
Start: 2021-10-14

## 2021-09-16 RX ORDER — SODIUM CHLORIDE 0.9 % (FLUSH) 0.9 %
10 SYRINGE (ML) INJECTION PRN
Status: DISCONTINUED | OUTPATIENT
Start: 2021-09-16 | End: 2021-09-17 | Stop reason: HOSPADM

## 2021-09-16 RX ADMIN — DEXTROSE MONOHYDRATE 500 MG: 50 INJECTION, SOLUTION INTRAVENOUS at 09:54

## 2021-09-16 RX ADMIN — Medication 10 ML: at 10:41

## 2021-09-16 ASSESSMENT — PAIN DESCRIPTION - PROGRESSION: CLINICAL_PROGRESSION: NOT CHANGED

## 2021-09-16 ASSESSMENT — PAIN DESCRIPTION - FREQUENCY: FREQUENCY: CONTINUOUS

## 2021-09-16 ASSESSMENT — PAIN DESCRIPTION - LOCATION
LOCATION: BACK
LOCATION: BACK;LEG

## 2021-09-16 ASSESSMENT — PAIN DESCRIPTION - PAIN TYPE: TYPE: CHRONIC PAIN

## 2021-09-16 ASSESSMENT — PAIN DESCRIPTION - ORIENTATION: ORIENTATION: LOWER;RIGHT

## 2021-09-16 ASSESSMENT — PAIN DESCRIPTION - DESCRIPTORS: DESCRIPTORS: ACHING

## 2021-09-16 NOTE — PROGRESS NOTES
Outpatient [id number] [facility]    Belatacept Visit    NAME:  Blanquita Soto OF BIRTH:  1980  MEDICAL RECORD NUMBER:  [id number]  DATE:  9/16/2021    Patient arrived to [facility] 58   [] per wheelchair   [x] ambulatory       Diagnosis: kidney transplant    Patient Active Problem List   Diagnosis    Kidney replaced by transplant    Hypercalcemia    High serum parathyroid hormone (PTH)    Hypophosphatemia     Allergies   Allergen Reactions    Dilaudid [Hydromorphone Hcl]     Morphine     Vancomycin Itching    Zithromax [Azithromycin]        Patient needs to be seropositive to EBV prior to Belatacept Infusion. Patient is EBV seropositive? Yes    Has patient had TB skin test or Quantiferon Gold TB test recently? No    Has patient had any vaccines given recently? Yes covid booster    Has patient been instructed to avoid any live vaccine administrations, such as but not limited to : intranasal influenza, measles, mumps, rubella, oral polio, BCG, yellow fever, varicella, and TY21 typhoid vaccines? Yes    Avoid prolonged sun light, tanning beds or sun lights. Use sun screen. Immunosuppression can increase risk of skin cancers. Patient is aware that Belatacept is an immunosuppressant therapy and can increase risk of developing  infections and possibly other  Malignancies?   Yes     BP (!) 148/96   Pulse 92   Temp 98.2 °F (36.8 °C) (Oral)   Resp 17   Ht 5' 3\" (1.6 m)   Wt 239 lb 10.2 oz (108.7 kg)   SpO2 100%   BMI 42.45 kg/m²     Pre Infusion Checklist       Patient given a copy of the medication guide:  Yes       Patient counseled about increased risk for:    Post-Transplant Lymphoproliferative Disorder (PTLD), predominantly involving the  CNS:  Yes                                                  Increased risk of Progressive Multifocal Leukoencephalopathy (PML), a CNS Infection:  Yes Over the past month, have you had any new or worsening medical problems such as a new or sudden change in your thinking, memory, speech, mood, behavior, vision, balance, strength, or other problems? No                         Over the past month, have you had any new or worsening symptoms such as fever, night sweats, tiredness that does not go away, weight loss or swollen glands? No                   Patient counseled to report any of these symptoms to physician right away. Patient given Belatacept 500 mg IVPB over 30 min IVPB using a .22 micron filter per protocol. Response to treatment:  Well tolerated by patient.     Education:    Indicates understanding       Electronically signed by Stefania Wetzel RN on 9/16/2021 at 10:06 AM

## 2021-10-08 ENCOUNTER — HOSPITAL ENCOUNTER (OUTPATIENT)
Dept: NUCLEAR MEDICINE | Age: 41
Discharge: HOME | End: 2021-10-08
Payer: MEDICARE

## 2021-10-08 DIAGNOSIS — M79.672 FOOT PAIN, BILATERAL: ICD-10-CM

## 2021-10-08 DIAGNOSIS — M79.671 FOOT PAIN, BILATERAL: ICD-10-CM

## 2021-10-08 DIAGNOSIS — M79.604 PAIN IN BOTH LOWER EXTREMITIES: ICD-10-CM

## 2021-10-08 DIAGNOSIS — M79.605 PAIN IN BOTH LOWER EXTREMITIES: ICD-10-CM

## 2021-10-08 PROCEDURE — 3430000000 HC RX DIAGNOSTIC RADIOPHARMACEUTICAL: Performed by: EMERGENCY MEDICINE

## 2021-10-08 PROCEDURE — 78315 BONE IMAGING 3 PHASE: CPT

## 2021-10-08 PROCEDURE — A9503 TC99M MEDRONATE: HCPCS | Performed by: EMERGENCY MEDICINE

## 2021-10-08 PROCEDURE — 78803 RP LOCLZJ TUM SPECT 1 AREA: CPT

## 2021-10-08 RX ORDER — TC 99M MEDRONATE 20 MG/10ML
25.51 INJECTION, POWDER, LYOPHILIZED, FOR SOLUTION INTRAVENOUS
Status: COMPLETED | OUTPATIENT
Start: 2021-10-08 | End: 2021-10-08

## 2021-10-08 RX ADMIN — TC 99M MEDRONATE 25.51 MILLICURIE: 20 INJECTION, POWDER, LYOPHILIZED, FOR SOLUTION INTRAVENOUS at 08:32

## 2021-10-14 ENCOUNTER — HOSPITAL ENCOUNTER (OUTPATIENT)
Dept: INFUSION THERAPY | Age: 41
Setting detail: INFUSION SERIES
Discharge: HOME | End: 2021-10-14
Payer: MEDICARE

## 2021-10-14 VITALS
SYSTOLIC BLOOD PRESSURE: 122 MMHG | BODY MASS INDEX: 42.58 KG/M2 | RESPIRATION RATE: 16 BRPM | OXYGEN SATURATION: 99 % | DIASTOLIC BLOOD PRESSURE: 72 MMHG | HEIGHT: 63 IN | TEMPERATURE: 98.5 F | HEART RATE: 96 BPM | WEIGHT: 240.3 LBS

## 2021-10-14 DIAGNOSIS — Z94.0 KIDNEY REPLACED BY TRANSPLANT: Primary | ICD-10-CM

## 2021-10-14 PROCEDURE — 6360000002 HC RX W HCPCS: Performed by: INTERNAL MEDICINE

## 2021-10-14 PROCEDURE — 2580000003 HC RX 258: Performed by: INTERNAL MEDICINE

## 2021-10-14 PROCEDURE — 96365 THER/PROPH/DIAG IV INF INIT: CPT

## 2021-10-14 RX ORDER — SODIUM CHLORIDE 0.9 % (FLUSH) 0.9 %
10 SYRINGE (ML) INJECTION PRN
Status: CANCELLED
Start: 2021-11-11

## 2021-10-14 RX ORDER — SODIUM CHLORIDE 0.9 % (FLUSH) 0.9 %
10 SYRINGE (ML) INJECTION PRN
Status: DISCONTINUED | OUTPATIENT
Start: 2021-10-14 | End: 2021-10-15 | Stop reason: HOSPADM

## 2021-10-14 RX ADMIN — DEXTROSE MONOHYDRATE 500 MG: 50 INJECTION, SOLUTION INTRAVENOUS at 11:26

## 2021-10-14 RX ADMIN — SODIUM CHLORIDE, PRESERVATIVE FREE 10 ML: 5 INJECTION INTRAVENOUS at 12:04

## 2021-10-14 RX ADMIN — SODIUM CHLORIDE, PRESERVATIVE FREE 10 ML: 5 INJECTION INTRAVENOUS at 11:26

## 2021-10-14 RX ADMIN — SODIUM CHLORIDE, PRESERVATIVE FREE 10 ML: 5 INJECTION INTRAVENOUS at 11:14

## 2021-10-14 RX ADMIN — SODIUM CHLORIDE, PRESERVATIVE FREE 10 ML: 5 INJECTION INTRAVENOUS at 12:20

## 2021-10-14 ASSESSMENT — PAIN SCALES - GENERAL
PAINLEVEL_OUTOF10: 0
PAINLEVEL_OUTOF10: 0

## 2021-10-14 NOTE — PROGRESS NOTES
Outpatient [id number] [facility]    Belatacept Visit    NAME:  Grey Gay OF BIRTH:  1980  MEDICAL RECORD NUMBER:  [id number]  DATE:  10/14/2021    Patient arrived to [facility] 58   [] per wheelchair   [x] ambulatory       Alert and oriented x 4. Patient reports that she has been feeling well but that she is having oral surgery on Oct. 25, 2021 to remove a cyst on her lower lip. Patient states that they are going to biopsy it. Patient also reports that she has a rectal fissure that she is putting Nifedipine ointment on. Patient states that if the Nifedipine ointment does not heal the fissure, they will be doing biopsies on it as well due to her history of HPV/rectal cancer. Patient has received 2 Covid-19 vaccines. Patient denies any signs/symptoms of respiratory illness and is wearing a mask to prevent the spread of Covid-19. Diagnosis: Kidney transplant on August 30, 2016. Patient Active Problem List   Diagnosis    Kidney replaced by transplant    Hypercalcemia    High serum parathyroid hormone (PTH)    Hypophosphatemia     Allergies   Allergen Reactions    Dilaudid [Hydromorphone Hcl]     Morphine     Vancomycin Itching    Zithromax [Azithromycin]        Patient needs to be seropositive to EBV prior to Belatacept Infusion. Patient is EBV seropositive? Yes    Has patient had TB skin test or Quantiferon Gold TB test recently? No - does not need to have done per Dr Jordana Cespeeds. Has patient had any vaccines given recently? Had Covid-19 vaccines on 3/31/2021 and 4/21/2021. Has patient been instructed to avoid any live vaccine administrations, such as but not limited to : intranasal influenza, measles, mumps, rubella, oral polio, BCG, yellow fever, varicella, and TY21 typhoid vaccines? Yes - reviewed with patient and she verbalized understanding    Avoid prolonged sun light, tanning beds or sun lights. Use sun screen.  Immunosuppression can increase risk of skin cancers. - reviewed with patient and she verbalized understanding    Patient is aware that Belatacept is an immunosuppressant therapy and can increase risk of developing  infections and possibly other  Malignancies? Yes - patient verbalized understanding     /72   Pulse 96   Temp 98.5 °F (36.9 °C) (Oral)   Resp 16   Ht 5' 3\" (1.6 m)   Wt 240 lb 4.8 oz (109 kg)   SpO2 99%   BMI 42.57 kg/m²     Pre Infusion Checklist       Patient given a copy of the medication guide:  Yes - given at previous visit and via AVS today       Patient counseled about increased risk for:    Post-Transplant Lymphoproliferative Disorder (PTLD), predominantly involving the  CNS:  Yes - patient verbalized understanding                                                Increased risk of Progressive Multifocal Leukoencephalopathy (PML), a CNS Infection:  Yes - patient verbalized understanding                                                                                                   Over the past month, have you had any new or worsening medical problems such as a new or sudden change in your thinking, memory, speech, mood, behavior, vision, balance, strength, or other problems? No - patient denies these symptoms                         Over the past month, have you had any new or worsening symptoms such as fever, night sweats, tiredness that does not go away, weight loss or swollen glands? No - patient denies these symptoms                   Patient counseled to report any of these symptoms to physician right away. - Reviewed with patient and she verbalized understanding    Patient given Belatacept 500 mg IVPB over 37 min IVPB using a .22 micron filter per protocol. Response to treatment:  Well tolerated by patient. Education: Verbal and written discharge instructions reviewed with patient. Verbalized understanding.  Written copy given via AVS     Patient to return for next Belatacept infusion on Novemeber 11, 2021.     Electronically signed by Kassidy Rangel RN on 10/14/2021 at 6:13 PM

## 2021-11-04 ENCOUNTER — TELEPHONE (OUTPATIENT)
Dept: BARIATRICS/WEIGHT MGMT | Age: 41
End: 2021-11-04

## 2021-11-11 ENCOUNTER — HOSPITAL ENCOUNTER (OUTPATIENT)
Dept: INFUSION THERAPY | Age: 41
Setting detail: INFUSION SERIES
Discharge: HOME | End: 2021-11-11
Payer: MEDICARE

## 2021-11-11 VITALS
SYSTOLIC BLOOD PRESSURE: 139 MMHG | DIASTOLIC BLOOD PRESSURE: 85 MMHG | OXYGEN SATURATION: 95 % | TEMPERATURE: 99.1 F | WEIGHT: 242.95 LBS | HEART RATE: 85 BPM | BODY MASS INDEX: 43.04 KG/M2 | RESPIRATION RATE: 18 BRPM

## 2021-11-11 DIAGNOSIS — Z94.0 KIDNEY REPLACED BY TRANSPLANT: Primary | ICD-10-CM

## 2021-11-11 PROCEDURE — 2580000003 HC RX 258: Performed by: INTERNAL MEDICINE

## 2021-11-11 PROCEDURE — 6360000002 HC RX W HCPCS: Performed by: INTERNAL MEDICINE

## 2021-11-11 PROCEDURE — 96365 THER/PROPH/DIAG IV INF INIT: CPT

## 2021-11-11 RX ORDER — SODIUM CHLORIDE 0.9 % (FLUSH) 0.9 %
10 SYRINGE (ML) INJECTION PRN
Status: DISCONTINUED | OUTPATIENT
Start: 2021-11-11 | End: 2021-11-12 | Stop reason: HOSPADM

## 2021-11-11 RX ORDER — SODIUM CHLORIDE 0.9 % (FLUSH) 0.9 %
10 SYRINGE (ML) INJECTION PRN
Status: CANCELLED
Start: 2021-12-09

## 2021-11-11 RX ADMIN — SODIUM CHLORIDE, PRESERVATIVE FREE 20 ML: 5 INJECTION INTRAVENOUS at 12:12

## 2021-11-11 RX ADMIN — DEXTROSE MONOHYDRATE 500 MG: 50 INJECTION, SOLUTION INTRAVENOUS at 11:35

## 2021-11-11 ASSESSMENT — PAIN DESCRIPTION - LOCATION: LOCATION: HEAD

## 2021-11-11 ASSESSMENT — PAIN DESCRIPTION - DESCRIPTORS: DESCRIPTORS: THROBBING

## 2021-11-11 ASSESSMENT — PAIN SCALES - GENERAL: PAINLEVEL_OUTOF10: 6

## 2021-11-11 ASSESSMENT — PAIN DESCRIPTION - ORIENTATION: ORIENTATION: RIGHT;LEFT

## 2021-11-11 ASSESSMENT — PAIN DESCRIPTION - PAIN TYPE: TYPE: ACUTE PAIN

## 2021-11-11 NOTE — PROGRESS NOTES
Outpatient [address] [facility]    Belatacept Visit    NAME:  Mariia Miller OF BIRTH:  1980  MEDICAL RECORD NUMBER:  [id number]  DATE:  11/11/2021    Patient arrived to [facility] 58   [] per wheelchair   [x] ambulatory       Diagnosis: Kidney Transplant 2016    Patient Active Problem List   Diagnosis    Kidney replaced by transplant    Hypercalcemia    High serum parathyroid hormone (PTH)    Hypophosphatemia     Allergies   Allergen Reactions    Dilaudid [Hydromorphone Hcl]     Morphine     Vancomycin Itching    Zithromax [Azithromycin]        Patient needs to be seropositive to EBV prior to Belatacept Infusion. Patient is EBV seropositive? Yes    Has patient had TB skin test or Quantiferon Gold TB test recently? No    Has patient had any vaccines given recently? Yes Flu shot 11/3/2    Has patient been instructed to avoid any live vaccine administrations, such as but not limited to : intranasal influenza, measles, mumps, rubella, oral polio, BCG, yellow fever, varicella, and TY21 typhoid vaccines? Yes    Avoid prolonged sun light, tanning beds or sun lights. Use sun screen. Immunosuppression can increase risk of skin cancers. Patient is aware that Belatacept is an immunosuppressant therapy and can increase risk of developing  infections and possibly other  Malignancies?   Yes     /77   Pulse 88   Temp 98.9 °F (37.2 °C) (Oral)   Resp 18   Wt 240 lb (108.9 kg)   SpO2 96%   BMI 42.51 kg/m²     Pre Infusion Checklist       Patient given a copy of the medication guide:  Yes       Patient counseled about increased risk for:    Post-Transplant Lymphoproliferative Disorder (PTLD), predominantly involving the  CNS:  Yes                                                  Increased risk of Progressive Multifocal Leukoencephalopathy (PML), a CNS Infection:  Yes Over the past month, have you had any new or worsening medical problems such as a new or sudden change in your thinking, memory, speech, mood, behavior, vision, balance, strength, or other problems? No                         Over the past month, have you had any new or worsening symptoms such as fever, night sweats, tiredness that does not go away, weight loss or swollen glands? No                   Patient counseled to report any of these symptoms to physician right away. Patient given Belatacept 500 mg IVPB over 30 min IVPB using a .22 micron filter per protocol. Response to treatment:  Well tolerated by patient.     Education:    Indicates understanding       Electronically signed by Effie Chaney RN on 11/11/2021 at 6:14 PM.

## 2021-12-07 ENCOUNTER — HOSPITAL ENCOUNTER (OUTPATIENT)
Dept: INFUSION THERAPY | Age: 41
Setting detail: INFUSION SERIES
Discharge: HOME | End: 2021-12-07
Payer: MEDICARE

## 2021-12-07 VITALS
TEMPERATURE: 97.5 F | WEIGHT: 240 LBS | OXYGEN SATURATION: 98 % | DIASTOLIC BLOOD PRESSURE: 88 MMHG | BODY MASS INDEX: 42.51 KG/M2 | RESPIRATION RATE: 17 BRPM | SYSTOLIC BLOOD PRESSURE: 145 MMHG | HEART RATE: 89 BPM

## 2021-12-07 DIAGNOSIS — Z94.0 KIDNEY REPLACED BY TRANSPLANT: Primary | ICD-10-CM

## 2021-12-07 PROCEDURE — 6360000002 HC RX W HCPCS: Performed by: INTERNAL MEDICINE

## 2021-12-07 PROCEDURE — 2580000003 HC RX 258: Performed by: INTERNAL MEDICINE

## 2021-12-07 PROCEDURE — 96365 THER/PROPH/DIAG IV INF INIT: CPT

## 2021-12-07 RX ORDER — SODIUM CHLORIDE 0.9 % (FLUSH) 0.9 %
10 SYRINGE (ML) INJECTION PRN
Status: CANCELLED
Start: 2022-01-04

## 2021-12-07 RX ORDER — SODIUM CHLORIDE 0.9 % (FLUSH) 0.9 %
10 SYRINGE (ML) INJECTION PRN
Status: DISCONTINUED | OUTPATIENT
Start: 2021-12-07 | End: 2021-12-08 | Stop reason: HOSPADM

## 2021-12-07 RX ADMIN — Medication 10 ML: at 10:11

## 2021-12-07 RX ADMIN — Medication 10 ML: at 10:51

## 2021-12-07 RX ADMIN — DEXTROSE MONOHYDRATE 500 MG: 50 INJECTION, SOLUTION INTRAVENOUS at 10:17

## 2021-12-07 ASSESSMENT — PAIN DESCRIPTION - DESCRIPTORS: DESCRIPTORS: DULL;HEADACHE

## 2021-12-07 ASSESSMENT — PAIN DESCRIPTION - ONSET: ONSET: ON-GOING

## 2021-12-07 ASSESSMENT — PAIN DESCRIPTION - PAIN TYPE: TYPE: CHRONIC PAIN

## 2021-12-07 ASSESSMENT — PAIN DESCRIPTION - ORIENTATION: ORIENTATION: RIGHT;LEFT

## 2021-12-07 ASSESSMENT — PAIN DESCRIPTION - FREQUENCY: FREQUENCY: CONTINUOUS

## 2021-12-07 ASSESSMENT — PAIN SCALES - GENERAL: PAINLEVEL_OUTOF10: 6

## 2021-12-07 ASSESSMENT — PAIN DESCRIPTION - PROGRESSION: CLINICAL_PROGRESSION: NOT CHANGED

## 2021-12-07 ASSESSMENT — PAIN DESCRIPTION - LOCATION: LOCATION: HEAD

## 2021-12-07 NOTE — PROGRESS NOTES
[facility] [id number] [facility]    Belat[facility]pt Visit    NAME:  Adelia Squires OF BIRTH:  1980  MEDICAL RECORD NUMBER:  [id number]  DATE:  12/7/2021    Patient arrived to [facility] 58   [] per wheelchair   [x] ambulatory       Diagnosis: Kidney Transplant    Patient Active Problem List   Diagnosis    Kidney replaced by transplant    Hypercalcemia    High serum parathyroid hormone (PTH)    Hypophosphatemia     Allergies   Allergen Reactions    Dilaudid [Hydromorphone Hcl]     Morphine     Vancomycin Itching    Zithromax [Azithromycin]        Patient needs to be seropositive to EBV prior to Belatacept Infusion. Patient is EBV seropositive? Yes      Has patient had any vaccines given recently? No    Has patient been instructed to avoid any live vaccine administrations, such as but not limited to : intranasal influenza, measles, mumps, rubella, oral polio, BCG, yellow fever, varicella, and TY21 typhoid vaccines? Yes    Avoid prolonged sun light, tanning beds or sun lights. Use sun screen. Immunosuppression can increase risk of skin cancers. Patient is aware that Belatacept is an immunosuppressant therapy and can increase risk of developing  infections and possibly other  Malignancies?   Yes     BP (!) 155/91   Pulse 91   Temp 97.5 °F (36.4 °C) (Oral)   Resp 17   Wt 240 lb (108.9 kg)   SpO2 98%   BMI 42.51 kg/m²     Pre Infusion Checklist       Patient given a copy of the medication guide:  Yes       Patient counseled about increased risk for:    Post-Transplant Lymphoproliferative Disorder (PTLD), predominantly involving the  CNS:  Yes                                                  Increased risk of Progressive Multifocal Leukoencephalopathy (PML), a CNS Infection:  Yes                                                                                                   Over the past month, have you had any new or worsening medical problems such as a new or sudden change in your thinking, memory, speech, mood, behavior, vision, balance, strength, or other problems? No                         Over the past month, have you had any new or worsening symptoms such as fever, night sweats, tiredness that does not go away, weight loss or swollen glands? No                   Patient counseled to report any of these symptoms to physician right away. Patient given Belatacept 500 mg IVPB over 30 min IVPB using a .22 micron filter per protocol. Response to treatment:  Well tolerated by patient.     Education:    Verbalized understanding     Next Visit: 1/4/2022    Electronically signed by Hewitt Cowden, RN on 12/7/2021 at 10:30 AM

## 2022-01-04 ENCOUNTER — HOSPITAL ENCOUNTER (OUTPATIENT)
Dept: INFUSION THERAPY | Age: 42
Setting detail: INFUSION SERIES
Discharge: HOME | End: 2022-01-04
Payer: MEDICARE

## 2022-01-04 VITALS
HEART RATE: 107 BPM | TEMPERATURE: 98.9 F | HEIGHT: 63 IN | DIASTOLIC BLOOD PRESSURE: 94 MMHG | WEIGHT: 241 LBS | RESPIRATION RATE: 17 BRPM | BODY MASS INDEX: 42.7 KG/M2 | SYSTOLIC BLOOD PRESSURE: 131 MMHG

## 2022-01-04 DIAGNOSIS — Z94.0 KIDNEY REPLACED BY TRANSPLANT: Primary | ICD-10-CM

## 2022-01-04 PROCEDURE — 2580000003 HC RX 258: Performed by: INTERNAL MEDICINE

## 2022-01-04 PROCEDURE — 96365 THER/PROPH/DIAG IV INF INIT: CPT

## 2022-01-04 PROCEDURE — 6360000002 HC RX W HCPCS: Performed by: INTERNAL MEDICINE

## 2022-01-04 RX ORDER — SODIUM CHLORIDE 0.9 % (FLUSH) 0.9 %
10 SYRINGE (ML) INJECTION PRN
Status: DISCONTINUED | OUTPATIENT
Start: 2022-01-04 | End: 2022-01-05 | Stop reason: HOSPADM

## 2022-01-04 RX ORDER — SODIUM CHLORIDE 0.9 % (FLUSH) 0.9 %
10 SYRINGE (ML) INJECTION PRN
Status: CANCELLED
Start: 2022-02-01

## 2022-01-04 RX ORDER — HYDROCODONE BITARTRATE AND ACETAMINOPHEN 5; 325 MG/1; MG/1
1 TABLET ORAL EVERY 6 HOURS PRN
COMMUNITY
End: 2022-08-15

## 2022-01-04 RX ADMIN — DEXTROSE MONOHYDRATE 500 MG: 50 INJECTION, SOLUTION INTRAVENOUS at 11:12

## 2022-01-04 RX ADMIN — SODIUM CHLORIDE, PRESERVATIVE FREE 10 ML: 5 INJECTION INTRAVENOUS at 11:54

## 2022-01-04 RX ADMIN — SODIUM CHLORIDE, PRESERVATIVE FREE 10 ML: 5 INJECTION INTRAVENOUS at 11:09

## 2022-01-04 ASSESSMENT — PAIN SCALES - GENERAL: PAINLEVEL_OUTOF10: 2

## 2022-01-04 ASSESSMENT — PAIN DESCRIPTION - DESCRIPTORS: DESCRIPTORS: ACHING

## 2022-01-04 ASSESSMENT — PAIN DESCRIPTION - LOCATION: LOCATION: BACK

## 2022-01-04 NOTE — PROGRESS NOTES
Outpatient [id number] [facility]    Belatacept Visit    NAME:  Jurgen Bal OF BIRTH:  1980  MEDICAL RECORD NUMBER:  [id number]  DATE:  1/4/2022    Patient arrived to [facility] 58   [] per wheelchair   [x] ambulatory       Diagnosis: kidney transplant    Patient Active Problem List   Diagnosis    Kidney replaced by transplant    Hypercalcemia    High serum parathyroid hormone (PTH)    Hypophosphatemia     Allergies   Allergen Reactions    Dilaudid [Hydromorphone Hcl]     Morphine     Vancomycin Itching    Zithromax [Azithromycin]     Dermabond Rash       Patient needs to be seropositive to EBV prior to Belatacept Infusion. Patient is EBV seropositive? Yes    Has patient had TB skin test or Quantiferon Gold TB test recently? No    Has patient had any vaccines given recently? No    Has patient been instructed to avoid any live vaccine administrations, such as but not limited to : intranasal influenza, measles, mumps, rubella, oral polio, BCG, yellow fever, varicella, and TY21 typhoid vaccines? Yes    Avoid prolonged sun light, tanning beds or sun lights. Use sun screen. Immunosuppression can increase risk of skin cancers. Patient is aware that Belatacept is an immunosuppressant therapy and can increase risk of developing  infections and possibly other  Malignancies?   Yes     BP (!) 131/94   Pulse 107   Temp 98.9 °F (37.2 °C) (Oral)   Resp 17   Ht 5' 3\" (1.6 m)   Wt 241 lb (109.3 kg)   BMI 42.69 kg/m²     Pre Infusion Checklist       Patient given a copy of the medication guide:  Yes       Patient counseled about increased risk for:    Post-Transplant Lymphoproliferative Disorder (PTLD), predominantly involving the  CNS:  Yes                                                  Increased risk of Progressive Multifocal Leukoencephalopathy (PML), a CNS Infection:  Yes Over the past month, have you had any new or worsening medical problems such as a new or sudden change in your thinking, memory, speech, mood, behavior, vision, balance, strength, or other problems? No                         Over the past month, have you had any new or worsening symptoms such as fever, night sweats, tiredness that does not go away, weight loss or swollen glands? No                   Patient counseled to report any of these symptoms to physician right away. Patient given Belatacept 5 mg IVPB over 30 min IVPB using a .22 micron filter per protocol. Response to treatment:  Well tolerated by patient.     Education:    Indicates understanding       Electronically signed by Ian Foreman RN on 1/4/2022 at 10:28 AM

## 2022-02-01 ENCOUNTER — HOSPITAL ENCOUNTER (OUTPATIENT)
Dept: INFUSION THERAPY | Age: 42
Setting detail: INFUSION SERIES
Discharge: HOME | End: 2022-02-01
Payer: MEDICARE

## 2022-02-01 VITALS
HEART RATE: 93 BPM | WEIGHT: 240 LBS | TEMPERATURE: 98.7 F | SYSTOLIC BLOOD PRESSURE: 128 MMHG | RESPIRATION RATE: 16 BRPM | OXYGEN SATURATION: 96 % | BODY MASS INDEX: 42.51 KG/M2 | DIASTOLIC BLOOD PRESSURE: 88 MMHG

## 2022-02-01 DIAGNOSIS — Z94.0 KIDNEY REPLACED BY TRANSPLANT: Primary | ICD-10-CM

## 2022-02-01 PROCEDURE — 2580000003 HC RX 258: Performed by: INTERNAL MEDICINE

## 2022-02-01 PROCEDURE — 6360000002 HC RX W HCPCS: Performed by: INTERNAL MEDICINE

## 2022-02-01 PROCEDURE — 96365 THER/PROPH/DIAG IV INF INIT: CPT

## 2022-02-01 RX ORDER — SODIUM CHLORIDE 0.9 % (FLUSH) 0.9 %
10 SYRINGE (ML) INJECTION PRN
Status: DISCONTINUED | OUTPATIENT
Start: 2022-02-01 | End: 2022-02-02 | Stop reason: HOSPADM

## 2022-02-01 RX ORDER — SODIUM CHLORIDE 0.9 % (FLUSH) 0.9 %
10 SYRINGE (ML) INJECTION PRN
Status: CANCELLED
Start: 2022-03-01

## 2022-02-01 RX ADMIN — SODIUM CHLORIDE, PRESERVATIVE FREE 10 ML: 5 INJECTION INTRAVENOUS at 11:08

## 2022-02-01 RX ADMIN — SODIUM CHLORIDE, PRESERVATIVE FREE 10 ML: 5 INJECTION INTRAVENOUS at 10:26

## 2022-02-01 RX ADMIN — DEXTROSE MONOHYDRATE 500 MG: 50 INJECTION, SOLUTION INTRAVENOUS at 10:27

## 2022-02-01 NOTE — PROGRESS NOTES
Outpatient [address] [facility]    Belatacept Visit    NAME:  Stephen Root OF BIRTH:  1980  MEDICAL RECORD NUMBER:  [id number]  DATE:  2/1/2022    Patient arrived to [facility] 58   [] per wheelchair   [x] ambulatory       Diagnosis: kidney transplant    Patient Active Problem List   Diagnosis    Kidney replaced by transplant    Hypercalcemia    High serum parathyroid hormone (PTH)    Hypophosphatemia     Allergies   Allergen Reactions    Dilaudid [Hydromorphone Hcl]     Morphine     Vancomycin Itching    Zithromax [Azithromycin]     Dermabond Rash       Patient needs to be seropositive to EBV prior to Belatacept Infusion. Patient is EBV seropositive? Yes    Has patient had TB skin test or Quantiferon Gold TB test recently? Yes     Has patient had any vaccines given recently? No    Has patient been instructed to avoid any live vaccine administrations, such as but not limited to : intranasal influenza, measles, mumps, rubella, oral polio, BCG, yellow fever, varicella, and TY21 typhoid vaccines? Yes    Avoid prolonged sun light, tanning beds or sun lights. Use sun screen. Immunosuppression can increase risk of skin cancers. Patient is aware that Belatacept is an immunosuppressant therapy and can increase risk of developing  infections and possibly other  Malignancies?   Yes     /88   Pulse 93   Temp 98.7 °F (37.1 °C) (Oral)   Resp 16   Wt 240 lb (108.9 kg)   SpO2 96%   BMI 42.51 kg/m²     Pre Infusion Checklist       Patient given a copy of the medication guide:  Yes       Patient counseled about increased risk for:    Post-Transplant Lymphoproliferative Disorder (PTLD), predominantly involving the  CNS:  Yes                                                  Increased risk of Progressive Multifocal Leukoencephalopathy (PML), a CNS Infection:  Yes Over the past month, have you had any new or worsening medical problems such as a new or sudden change in your thinking, memory, speech, mood, behavior, vision, balance, strength, or other problems? Yes                          Over the past month, have you had any new or worsening symptoms such as fever, night sweats, tiredness that does not go away, weight loss or swollen glands? No                   Patient counseled to report any of these symptoms to physician right away. Patient given Belatacept 500 mg IVPB over 30 min IVPB using a .22 micron filter per protocol. Response to treatment:  Well tolerated by patient.     Education:    Indicates understanding       Electronically signed by Luis Auguste RN on 2/1/2022 at 11:18 AM

## 2022-03-02 ENCOUNTER — HOSPITAL ENCOUNTER (OUTPATIENT)
Dept: INFUSION THERAPY | Age: 42
Setting detail: INFUSION SERIES
Discharge: HOME | End: 2022-03-02
Payer: MEDICARE

## 2022-03-02 VITALS
OXYGEN SATURATION: 97 % | BODY MASS INDEX: 42.7 KG/M2 | HEART RATE: 87 BPM | RESPIRATION RATE: 16 BRPM | HEIGHT: 63 IN | DIASTOLIC BLOOD PRESSURE: 77 MMHG | WEIGHT: 241 LBS | TEMPERATURE: 98.4 F | SYSTOLIC BLOOD PRESSURE: 156 MMHG

## 2022-03-02 DIAGNOSIS — Z94.0 KIDNEY REPLACED BY TRANSPLANT: Primary | ICD-10-CM

## 2022-03-02 PROCEDURE — 2580000003 HC RX 258: Performed by: INTERNAL MEDICINE

## 2022-03-02 PROCEDURE — 96365 THER/PROPH/DIAG IV INF INIT: CPT

## 2022-03-02 PROCEDURE — 6360000002 HC RX W HCPCS: Performed by: INTERNAL MEDICINE

## 2022-03-02 RX ORDER — NARATRIPTAN 2.5 MG/1
2.5 TABLET ORAL
COMMUNITY

## 2022-03-02 RX ORDER — SODIUM CHLORIDE 0.9 % (FLUSH) 0.9 %
10 SYRINGE (ML) INJECTION PRN
Status: DISCONTINUED | OUTPATIENT
Start: 2022-03-02 | End: 2022-03-03 | Stop reason: HOSPADM

## 2022-03-02 RX ORDER — SODIUM CHLORIDE 0.9 % (FLUSH) 0.9 %
10 SYRINGE (ML) INJECTION PRN
Status: CANCELLED
Start: 2022-03-30

## 2022-03-02 RX ADMIN — SODIUM CHLORIDE, PRESERVATIVE FREE 10 ML: 5 INJECTION INTRAVENOUS at 11:00

## 2022-03-02 RX ADMIN — SODIUM CHLORIDE, PRESERVATIVE FREE 10 ML: 5 INJECTION INTRAVENOUS at 12:02

## 2022-03-02 RX ADMIN — DEXTROSE MONOHYDRATE 500 MG: 50 INJECTION, SOLUTION INTRAVENOUS at 11:10

## 2022-03-02 RX ADMIN — SODIUM CHLORIDE, PRESERVATIVE FREE 10 ML: 5 INJECTION INTRAVENOUS at 11:44

## 2022-03-02 ASSESSMENT — PAIN SCALES - GENERAL
PAINLEVEL_OUTOF10: 0
PAINLEVEL_OUTOF10: 0

## 2022-03-02 NOTE — PROGRESS NOTES
[facility]    [facility]    NAME:  Drew Murphy OF BIRTH:  1980  MEDICAL RECORD NUMBER:  [id number]  DATE:  3/2/2022    Patient arrived to [facility] 58   [] per wheelchair   [x] ambulatory     Alert and oriented x 4. Reports that she is feeling well. States that she is recovering well from her back surgery in December 2021 and that she is scheduled for weight loss surgery in June 2022. Patient denies signs/symptoms of respiratory infection, has received Covid-19 vaccination and is wearing a mask to prevent the spread of Covid-19. Diagnosis: Kidney transplant on August 30, 2016    Patient Active Problem List   Diagnosis    Kidney replaced by transplant    Hypercalcemia    High serum parathyroid hormone (PTH)    Hypophosphatemia     Allergies   Allergen Reactions    Dilaudid [Hydromorphone Hcl]     Morphine     Vancomycin Itching    Zithromax [Azithromycin]     Dermabond Rash       Patient needs to be seropositive to EBV prior to Belatacept Infusion. Patient is EBV seropositive? Yes    Has patient had TB skin test or Quantiferon Gold TB test recently? No - does not need one per Dr Alena Borges    Has patient had any vaccines given recently? No - Covid vaccinations in the spring of 2021. Has patient been instructed to avoid any live vaccine administrations, such as but not limited to : intranasal influenza, measles, mumps, rubella, oral polio, BCG, yellow fever, varicella, and TY21 typhoid vaccines? Yes - reviewed with patient and she verbalized understanding    Avoid prolonged sun light, tanning beds or sun lights. Use sun screen. Immunosuppression can increase risk of skin cancers. - Reviewed with patient and she verbalized understanding    Patient is aware that Belatacept is an immunosuppressant therapy and can increase risk of developing  infections and possibly other  Malignancies?   Yes - patient verbalized understanding     BP (!) 156/77   Pulse 87   Temp 98.4 °F (36.9 °C) (Oral)   Resp 16   Ht 5' 3\" (1.6 m)   Wt 241 lb (109.3 kg)   SpO2 97%   BMI 42.69 kg/m²     Pre Infusion Checklist       Patient given a copy of the medication guide:  Yes - given via AVS today       Patient counseled about increased risk for:    Post-Transplant Lymphoproliferative Disorder (PTLD), predominantly involving the  CNS:  Yes                                                  Increased risk of Progressive Multifocal Leukoencephalopathy (PML), a CNS Infection:  Yes                                                                                                   Over the past month, have you had any new or worsening medical problems such as a new or sudden change in your thinking, memory, speech, mood, behavior, vision, balance, strength, or other problems? No - Patient denies these symptoms                         Over the past month, have you had any new or worsening symptoms such as fever, night sweats, tiredness that does not go away, weight loss or swollen glands? No - Patient denies these symptoms                   Patient counseled to report any of these symptoms to physician right away. - Patient verbalized understanding    Patient given Belatacept 500 mg IVPB over 33 min IVPB using a .22 micron filter per protocol. Response to treatment:  Well tolerated by patient. Education: Verbal and written discharge instructions reviewed with patient. Verbalized understanding. Written copy given via AVS    Patient to return March 30, 2022 for next Belatacept infusion.     Electronically signed by Brooks Cedeño RN on 3/2/2022 at 5:19 PM

## 2022-03-30 ENCOUNTER — HOSPITAL ENCOUNTER (OUTPATIENT)
Dept: INFUSION THERAPY | Age: 42
Setting detail: INFUSION SERIES
Discharge: HOME | End: 2022-03-30
Payer: MEDICARE

## 2022-03-30 VITALS
BODY MASS INDEX: 42.54 KG/M2 | DIASTOLIC BLOOD PRESSURE: 85 MMHG | SYSTOLIC BLOOD PRESSURE: 123 MMHG | OXYGEN SATURATION: 98 % | RESPIRATION RATE: 16 BRPM | HEART RATE: 92 BPM | WEIGHT: 240.08 LBS | TEMPERATURE: 98.1 F | HEIGHT: 63 IN

## 2022-03-30 DIAGNOSIS — Z94.0 KIDNEY REPLACED BY TRANSPLANT: Primary | ICD-10-CM

## 2022-03-30 PROCEDURE — 96365 THER/PROPH/DIAG IV INF INIT: CPT

## 2022-03-30 PROCEDURE — 2580000003 HC RX 258: Performed by: INTERNAL MEDICINE

## 2022-03-30 PROCEDURE — 6360000002 HC RX W HCPCS: Performed by: INTERNAL MEDICINE

## 2022-03-30 RX ORDER — EMPAGLIFLOZIN 10 MG/1
10 TABLET, FILM COATED ORAL DAILY
COMMUNITY

## 2022-03-30 RX ORDER — SODIUM CHLORIDE 0.9 % (FLUSH) 0.9 %
10 SYRINGE (ML) INJECTION PRN
Status: CANCELLED
Start: 2022-04-27

## 2022-03-30 RX ORDER — SODIUM CHLORIDE 0.9 % (FLUSH) 0.9 %
10 SYRINGE (ML) INJECTION PRN
Status: DISCONTINUED | OUTPATIENT
Start: 2022-03-30 | End: 2022-03-31 | Stop reason: HOSPADM

## 2022-03-30 RX ADMIN — SODIUM CHLORIDE, PRESERVATIVE FREE 10 ML: 5 INJECTION INTRAVENOUS at 10:32

## 2022-03-30 RX ADMIN — DEXTROSE MONOHYDRATE 500 MG: 50 INJECTION, SOLUTION INTRAVENOUS at 10:42

## 2022-03-30 RX ADMIN — SODIUM CHLORIDE, PRESERVATIVE FREE 10 ML: 5 INJECTION INTRAVENOUS at 11:37

## 2022-03-30 RX ADMIN — SODIUM CHLORIDE, PRESERVATIVE FREE 10 ML: 5 INJECTION INTRAVENOUS at 11:19

## 2022-03-30 ASSESSMENT — PAIN SCALES - GENERAL
PAINLEVEL_OUTOF10: 0

## 2022-03-30 NOTE — PROGRESS NOTES
Outpatient [address] [facility]    Belatacept Visit    NAME:  Abebe Fontaine OF BIRTH:  1980  MEDICAL RECORD NUMBER:  [id number]  DATE:  3/30/2022    Patient arrived to [facility] 58   [] per wheelchair   [x] ambulatory        Alert and oriented x 4. Patient states that she has been feeling well and had a great trip to [place] 2 weeks ago. Patient confirms that she is scheduled for weight loss surgery in July 2022 and that she has started adjusting her diet for post surgery. Patient denies signs/symptoms of respiratory infection, has received Covid-19 vaccination and is wearing a mask to prevent the spread of Covid-19. Diagnosis: Kidney transplant on August 30, 2016. Patient Active Problem List   Diagnosis    Kidney replaced by transplant    Hypercalcemia    High serum parathyroid hormone (PTH)    Hypophosphatemia     Allergies   Allergen Reactions    Dilaudid [Hydromorphone Hcl]     Morphine     Vancomycin Itching    Zithromax [Azithromycin]     Dermabond Rash       Patient needs to be seropositive to EBV prior to Belatacept Infusion. Patient is EBV seropositive? Yes    Has patient had TB skin test or Quantiferon Gold TB test recently? No - does not need to have this done per Dr Debora Rivas    Has patient had any vaccines given recently? No - Covid-19 vaccines in the spring of 2021    Has patient been instructed to avoid any live vaccine administrations, such as but not limited to : intranasal influenza, measles, mumps, rubella, oral polio, BCG, yellow fever, varicella, and TY21 typhoid vaccines? Yes - reviewed with patient and she verbalized understanding    Avoid prolonged sun light, tanning beds or sun lights. Use sun screen. Immunosuppression can increase risk of skin cancers.  - reviewed with patient and she verbalized understanding    Patient is aware that Belatacept is an immunosuppressant therapy and can increase risk of developing  infections and

## 2022-04-28 ENCOUNTER — HOSPITAL ENCOUNTER (OUTPATIENT)
Dept: INFUSION THERAPY | Age: 42
Setting detail: INFUSION SERIES
Discharge: HOME | End: 2022-04-28
Payer: MEDICARE

## 2022-04-28 VITALS
WEIGHT: 238.98 LBS | SYSTOLIC BLOOD PRESSURE: 117 MMHG | HEART RATE: 84 BPM | BODY MASS INDEX: 42.33 KG/M2 | RESPIRATION RATE: 16 BRPM | TEMPERATURE: 97.5 F | DIASTOLIC BLOOD PRESSURE: 91 MMHG

## 2022-04-28 DIAGNOSIS — Z94.0 KIDNEY REPLACED BY TRANSPLANT: Primary | ICD-10-CM

## 2022-04-28 PROCEDURE — 2580000003 HC RX 258: Performed by: INTERNAL MEDICINE

## 2022-04-28 PROCEDURE — 96365 THER/PROPH/DIAG IV INF INIT: CPT

## 2022-04-28 PROCEDURE — 6360000002 HC RX W HCPCS: Performed by: INTERNAL MEDICINE

## 2022-04-28 RX ORDER — SODIUM CHLORIDE 0.9 % (FLUSH) 0.9 %
10 SYRINGE (ML) INJECTION PRN
Status: DISCONTINUED | OUTPATIENT
Start: 2022-04-28 | End: 2022-04-29 | Stop reason: HOSPADM

## 2022-04-28 RX ORDER — SODIUM CHLORIDE 0.9 % (FLUSH) 0.9 %
10 SYRINGE (ML) INJECTION PRN
Status: CANCELLED
Start: 2022-05-26

## 2022-04-28 RX ADMIN — SODIUM CHLORIDE, PRESERVATIVE FREE 10 ML: 5 INJECTION INTRAVENOUS at 09:08

## 2022-04-28 RX ADMIN — SODIUM CHLORIDE, PRESERVATIVE FREE 10 ML: 5 INJECTION INTRAVENOUS at 09:52

## 2022-04-28 RX ADMIN — DEXTROSE MONOHYDRATE 500 MG: 50 INJECTION, SOLUTION INTRAVENOUS at 09:11

## 2022-04-28 NOTE — PROGRESS NOTES
Outpatient [address] [facility]    Belatacept Visit    NAME:  Bran Seat OF BIRTH:  1980  MEDICAL RECORD NUMBER:  [id number]  DATE:  4/28/2022    Patient arrived to [facility] 58   [] per wheelchair   [x] ambulatory       Diagnosis: Kidney transplant    Patient Active Problem List   Diagnosis    Kidney replaced by transplant    Hypercalcemia    High serum parathyroid hormone (PTH)    Hypophosphatemia     Allergies   Allergen Reactions    Dilaudid [Hydromorphone Hcl]     Morphine     Vancomycin Itching    Zithromax [Azithromycin]     Dermabond Rash       Patient needs to be seropositive to EBV prior to Belatacept Infusion. Patient is EBV seropositive? Yes    Has patient had TB skin test or Quantiferon Gold TB test recently? No    Has patient had any vaccines given recently? No    Has patient been instructed to avoid any live vaccine administrations, such as but not limited to : intranasal influenza, measles, mumps, rubella, oral polio, BCG, yellow fever, varicella, and TY21 typhoid vaccines? Yes    Avoid prolonged sun light, tanning beds or sun lights. Use sun screen. Immunosuppression can increase risk of skin cancers. Patient is aware that Belatacept is an immunosuppressant therapy and can increase risk of developing  infections and possibly other  Malignancies?   Yes     BP (!) 117/91   Pulse 84   Temp 97.5 °F (36.4 °C) (Oral)   Resp 16   Wt 238 lb 15.7 oz (108.4 kg) Comment: actual  BMI 42.33 kg/m²     Pre Infusion Checklist       Patient given a copy of the medication guide:  Yes       Patient counseled about increased risk for:    Post-Transplant Lymphoproliferative Disorder (PTLD), predominantly involving the  CNS:  Yes                                                  Increased risk of Progressive Multifocal Leukoencephalopathy (PML), a CNS Infection:  Yes Over the past month, have you had any new or worsening medical problems such as a new or sudden change in your thinking, memory, speech, mood, behavior, vision, balance, strength, or other problems? No                         Over the past month, have you had any new or worsening symptoms such as fever, night sweats, tiredness that does not go away, weight loss or swollen glands? No                   Patient counseled to report any of these symptoms to physician right away. Patient given Belatacept 500 mg IVPB over 30 min IVPB using a .22 micron filter per protocol. Response to treatment:  Well tolerated by patient.     Education:    Indicates understanding       Electronically signed by Kiera Morrell RN on 4/28/2022 at 9:17 AM

## 2022-05-24 ENCOUNTER — HOSPITAL ENCOUNTER (OUTPATIENT)
Dept: INFUSION THERAPY | Age: 42
Setting detail: INFUSION SERIES
Discharge: HOME | End: 2022-05-24
Payer: MEDICARE

## 2022-05-24 VITALS
OXYGEN SATURATION: 99 % | HEART RATE: 97 BPM | TEMPERATURE: 98.4 F | BODY MASS INDEX: 42.06 KG/M2 | WEIGHT: 237.44 LBS | RESPIRATION RATE: 16 BRPM | DIASTOLIC BLOOD PRESSURE: 93 MMHG | SYSTOLIC BLOOD PRESSURE: 136 MMHG

## 2022-05-24 DIAGNOSIS — Z94.0 KIDNEY REPLACED BY TRANSPLANT: Primary | ICD-10-CM

## 2022-05-24 PROCEDURE — 6360000002 HC RX W HCPCS: Performed by: INTERNAL MEDICINE

## 2022-05-24 PROCEDURE — 96365 THER/PROPH/DIAG IV INF INIT: CPT

## 2022-05-24 PROCEDURE — 2580000003 HC RX 258: Performed by: INTERNAL MEDICINE

## 2022-05-24 RX ORDER — DULAGLUTIDE 1.5 MG/.5ML
1.5 INJECTION, SOLUTION SUBCUTANEOUS WEEKLY
COMMUNITY

## 2022-05-24 RX ORDER — SODIUM CHLORIDE 0.9 % (FLUSH) 0.9 %
10 SYRINGE (ML) INJECTION PRN
Status: CANCELLED
Start: 2022-06-21

## 2022-05-24 RX ORDER — SODIUM CHLORIDE 0.9 % (FLUSH) 0.9 %
10 SYRINGE (ML) INJECTION PRN
Status: DISCONTINUED | OUTPATIENT
Start: 2022-05-24 | End: 2022-05-25 | Stop reason: HOSPADM

## 2022-05-24 RX ADMIN — DEXTROSE MONOHYDRATE 500 MG: 50 INJECTION, SOLUTION INTRAVENOUS at 10:27

## 2022-05-24 RX ADMIN — SODIUM CHLORIDE, PRESERVATIVE FREE 10 ML: 5 INJECTION INTRAVENOUS at 10:26

## 2022-05-24 RX ADMIN — SODIUM CHLORIDE, PRESERVATIVE FREE 10 ML: 5 INJECTION INTRAVENOUS at 11:05

## 2022-05-24 NOTE — PROGRESS NOTES
Outpatient [address] [facility]    Belatacept Visit    NAME:  Elva Oliveira OF BIRTH:  1980  MEDICAL RECORD NUMBER:  [id number]  DATE:  5/24/2022    Patient arrived to [facility] 58   [] per wheelchair   [x] ambulatory       Diagnosis: Kidney transplant    Patient Active Problem List   Diagnosis    Kidney replaced by transplant    Hypercalcemia    High serum parathyroid hormone (PTH)    Hypophosphatemia     Allergies   Allergen Reactions    Dilaudid [Hydromorphone Hcl]     Morphine     Vancomycin Itching    Zithromax [Azithromycin]     Dermabond Rash       Patient needs to be seropositive to EBV prior to Belatacept Infusion. Patient is EBV seropositive? Yes    Has patient had TB skin test or Quantiferon Gold TB test recently? No    Has patient had any vaccines given recently? No    Has patient been instructed to avoid any live vaccine administrations, such as but not limited to : intranasal influenza, measles, mumps, rubella, oral polio, BCG, yellow fever, varicella, and TY21 typhoid vaccines? Yes    Avoid prolonged sun light, tanning beds or sun lights. Use sun screen. Immunosuppression can increase risk of skin cancers. Patient is aware that Belatacept is an immunosuppressant therapy and can increase risk of developing  infections and possibly other  Malignancies?   Yes     BP (!) 136/93   Pulse 97   Temp 98.4 °F (36.9 °C) (Oral)   Resp 16   Wt 237 lb 7 oz (107.7 kg)   SpO2 99%   BMI 42.06 kg/m²     Pre Infusion Checklist       Patient given a copy of the medication guide:  Yes       Patient counseled about increased risk for:    Post-Transplant Lymphoproliferative Disorder (PTLD), predominantly involving the  CNS:  Yes                                                  Increased risk of Progressive Multifocal Leukoencephalopathy (PML), a CNS Infection:  Yes Over the past month, have you had any new or worsening medical problems such as a new or sudden change in your thinking, memory, speech, mood, behavior, vision, balance, strength, or other problems? No                         Over the past month, have you had any new or worsening symptoms such as fever, night sweats, tiredness that does not go away, weight loss or swollen glands? No                   Patient counseled to report any of these symptoms to physician right away. Patient given Belatacept 500 mg IVPB over 30 min IVPB using a .22 micron filter per protocol. Response to treatment:  Well tolerated by patient.     Education:    Indicates understanding       Electronically signed by Olya Mancilla RN on 5/24/2022 at 10:45 AM

## 2022-06-21 ENCOUNTER — HOSPITAL ENCOUNTER (OUTPATIENT)
Dept: INFUSION THERAPY | Age: 42
Setting detail: INFUSION SERIES
Discharge: HOME | End: 2022-06-21
Payer: MEDICARE

## 2022-06-21 VITALS
BODY MASS INDEX: 41.63 KG/M2 | TEMPERATURE: 98.4 F | WEIGHT: 235 LBS | HEART RATE: 85 BPM | OXYGEN SATURATION: 100 % | DIASTOLIC BLOOD PRESSURE: 87 MMHG | SYSTOLIC BLOOD PRESSURE: 120 MMHG | RESPIRATION RATE: 16 BRPM

## 2022-06-21 DIAGNOSIS — Z94.0 KIDNEY REPLACED BY TRANSPLANT: Primary | ICD-10-CM

## 2022-06-21 PROCEDURE — 96365 THER/PROPH/DIAG IV INF INIT: CPT

## 2022-06-21 PROCEDURE — 2580000003 HC RX 258: Performed by: INTERNAL MEDICINE

## 2022-06-21 PROCEDURE — 6360000002 HC RX W HCPCS: Performed by: INTERNAL MEDICINE

## 2022-06-21 RX ORDER — SODIUM CHLORIDE 0.9 % (FLUSH) 0.9 %
10 SYRINGE (ML) INJECTION PRN
Status: DISCONTINUED | OUTPATIENT
Start: 2022-06-21 | End: 2022-06-22 | Stop reason: HOSPADM

## 2022-06-21 RX ORDER — SODIUM CHLORIDE 0.9 % (FLUSH) 0.9 %
10 SYRINGE (ML) INJECTION PRN
Status: CANCELLED
Start: 2022-07-19

## 2022-06-21 RX ADMIN — Medication 10 ML: at 10:42

## 2022-06-21 RX ADMIN — Medication 10 ML: at 10:00

## 2022-06-21 RX ADMIN — DEXTROSE MONOHYDRATE 500 MG: 50 INJECTION, SOLUTION INTRAVENOUS at 10:02

## 2022-06-21 ASSESSMENT — PAIN SCALES - GENERAL: PAINLEVEL_OUTOF10: 0

## 2022-06-21 NOTE — PROGRESS NOTES
Outpatient [address] [facility]    Belatacept Visit    NAME:  Aissatou Rock OF BIRTH:  1980  MEDICAL RECORD NUMBER:  [id number]  DATE:  6/21/2022    Patient arrived to [facility] 58   [] per wheelchair   [x] ambulatory       Diagnosis: kidney transplant    Patient Active Problem List   Diagnosis    Kidney replaced by transplant    Hypercalcemia    High serum parathyroid hormone (PTH)    Hypophosphatemia     Allergies   Allergen Reactions    Dilaudid [Hydromorphone Hcl]     Morphine     Vancomycin Itching    Zithromax [Azithromycin]     Dermabond Rash       Patient needs to be seropositive to EBV prior to Belatacept Infusion. Patient is EBV seropositive? Yes    Has patient had TB skin test or Quantiferon Gold TB test recently? No    Has patient had any vaccines given recently? No    Has patient been instructed to avoid any live vaccine administrations, such as but not limited to : intranasal influenza, measles, mumps, rubella, oral polio, BCG, yellow fever, varicella, and TY21 typhoid vaccines? Yes    Avoid prolonged sun light, tanning beds or sun lights. Use sun screen. Immunosuppression can increase risk of skin cancers. Patient is aware that Belatacept is an immunosuppressant therapy and can increase risk of developing  infections and possibly other  Malignancies?   Yes     /87   Pulse 85   Temp 98.4 °F (36.9 °C) (Oral)   Resp 16   Wt 235 lb (106.6 kg)   SpO2 100%   BMI 41.63 kg/m²     Pre Infusion Checklist       Patient given a copy of the medication guide:  Yes       Patient counseled about increased risk for:    Post-Transplant Lymphoproliferative Disorder (PTLD), predominantly involving the  CNS:  Yes                                                  Increased risk of Progressive Multifocal Leukoencephalopathy (PML), a CNS Infection:  Yes Over the past month, have you had any new or worsening medical problems such as a new or sudden change in your thinking, memory, speech, mood, behavior, vision, balance, strength, or other problems? No                         Over the past month, have you had any new or worsening symptoms such as fever, night sweats, tiredness that does not go away, weight loss or swollen glands? No                   Patient counseled to report any of these symptoms to physician right away. Patient given Belatacept 500 mg IVPB over 30 min IVPB using a .22 micron filter per protocol. Response to treatment:  Well tolerated by patient.     Education:    Verbalized understanding       Electronically signed by Andres Thompson RN on 6/21/2022 at 10:46 AM

## 2022-06-30 NOTE — ADDENDUM NOTE
Encounter addended by: Saturnino Mckinney MA on: 6/30/2022 3:21 PM   Actions taken: Document created, Document edited

## 2022-07-20 ENCOUNTER — HOSPITAL ENCOUNTER (OUTPATIENT)
Dept: INFUSION THERAPY | Age: 42
Setting detail: INFUSION SERIES
Discharge: HOME | End: 2022-07-20
Payer: MEDICARE

## 2022-07-20 VITALS
HEART RATE: 93 BPM | HEIGHT: 63 IN | BODY MASS INDEX: 41.11 KG/M2 | RESPIRATION RATE: 16 BRPM | DIASTOLIC BLOOD PRESSURE: 61 MMHG | OXYGEN SATURATION: 99 % | WEIGHT: 232 LBS | TEMPERATURE: 98.4 F | SYSTOLIC BLOOD PRESSURE: 113 MMHG

## 2022-07-20 DIAGNOSIS — Z94.0 KIDNEY REPLACED BY TRANSPLANT: Primary | ICD-10-CM

## 2022-07-20 PROCEDURE — 2580000003 HC RX 258: Performed by: INTERNAL MEDICINE

## 2022-07-20 PROCEDURE — 6360000002 HC RX W HCPCS: Performed by: INTERNAL MEDICINE

## 2022-07-20 PROCEDURE — 96365 THER/PROPH/DIAG IV INF INIT: CPT

## 2022-07-20 RX ORDER — SODIUM CHLORIDE 0.9 % (FLUSH) 0.9 %
10 SYRINGE (ML) INJECTION PRN
Status: CANCELLED
Start: 2022-08-17

## 2022-07-20 RX ORDER — SODIUM CHLORIDE 0.9 % (FLUSH) 0.9 %
10 SYRINGE (ML) INJECTION PRN
Status: DISCONTINUED | OUTPATIENT
Start: 2022-07-20 | End: 2022-07-21 | Stop reason: HOSPADM

## 2022-07-20 RX ADMIN — SODIUM CHLORIDE, PRESERVATIVE FREE 10 ML: 5 INJECTION INTRAVENOUS at 11:49

## 2022-07-20 RX ADMIN — DEXTROSE MONOHYDRATE 500 MG: 50 INJECTION, SOLUTION INTRAVENOUS at 11:15

## 2022-07-20 RX ADMIN — SODIUM CHLORIDE, PRESERVATIVE FREE 10 ML: 5 INJECTION INTRAVENOUS at 10:36

## 2022-07-20 RX ADMIN — SODIUM CHLORIDE, PRESERVATIVE FREE 10 ML: 5 INJECTION INTRAVENOUS at 12:04

## 2022-07-20 RX ADMIN — SODIUM CHLORIDE, PRESERVATIVE FREE 10 ML: 5 INJECTION INTRAVENOUS at 11:14

## 2022-07-20 ASSESSMENT — PAIN SCALES - GENERAL
PAINLEVEL_OUTOF10: 0
PAINLEVEL_OUTOF10: 0

## 2022-07-20 NOTE — DISCHARGE INSTRUCTIONS
Outpatient Infusion Discharge Instructions  [place] [address] [facility] [place] [address] [address], [address]  Telephone: [phone]    NAME:  Kyung Guevara OF BIRTH:  1980  MEDICAL RECORD NUMBER:  [id number]  DATE:  7/20/2022    Reason for Outpatient Infusion Visit: Belatacept Infusion    If you develop any these symptoms please contact you Doctor    [x] Nausea and/or vomiting not relieved with medication   [x] Swelling, redness, and/or bleeding at injection or IV site    [x] Fever or chills  [x] Rash or itching   [x] Shortness of breath  [x] Please review After Visit Summary (AVS) information on: Belatacept, kidney transplant    [x] Other: Next visit - August 17, 2022    Discharge Instructions for Belatacept    Please report any of these symptoms to your doctor right away:  Changes in mood or usual behavior  Confusion, problems thinking, loss of memory  Changes in walking or talking  Decreased strength or weakness on one side of body  Changes in vision  Fever, night sweats or tiredness that does not go away  Weight loss, swollen glands  Flu, cold symptoms or cough  Stomach are pain  Vomiting or diarrhea  Tenderness over your transplanted kidney  Change in the amount of urine that you make, blood in urine, pain or burning on urination. A new skin lesion or bump, or change is size or color of a mole    Call the infusion Center, [phone] if you are sick or have any of the above symptoms several days before your next appointment. Outpatient Infusion Center Information: Should you experience any significant changes in your health or have questions about your care please contact the [address] at [address] 8:00 am - 4:00 pm.  If you need help outside these hours and cannot wait until we are again available, contact your Primary Care Physician or go to the hospital emergency room.        Electronically signed by Nicole Rogers LUBA Auguste on 7/20/2022 at 11:21 AM

## 2022-07-20 NOTE — PROGRESS NOTES
Outpatient [address] [facility]    Belatacept Visit    NAME:  Kathie Romero OF BIRTH:  1980  MEDICAL RECORD NUMBER:  [id number]  DATE:  7/20/2022    Patient arrived to [facility] 58   [] per wheelchair   [x] ambulatory       Alert and oriented x 4. Patient states that she has been feeling well and had a great trip to [place] 2 weeks ago. Patient states that she has postponed her weight loss surgery scheduled for this month. Patient states that she is going to wait until winter 2022 to have it done because she does not want to be recovering during the summer. Patient denies signs/symptoms of respiratory infection, has received Covid-19 vaccination and is wearing a mask to prevent the spread of Covid-19. Diagnosis: Kidney transplant August 30, 2016. Patient Active Problem List   Diagnosis    Kidney replaced by transplant    Hypercalcemia    High serum parathyroid hormone (PTH)    Hypophosphatemia     Allergies   Allergen Reactions    Dilaudid [Hydromorphone Hcl]     Morphine     Vancomycin Itching    Zithromax [Azithromycin]     Dermabond Rash       Patient needs to be seropositive to EBV prior to Belatacept Infusion. Patient is EBV seropositive? Yes    Has patient had TB skin test or Quantiferon Gold TB test recently? No - does not need to have this done per Dr Rochelle Mendez    Has patient had any vaccines given recently? No    Has patient been instructed to avoid any live vaccine administrations, such as but not limited to : intranasal influenza, measles, mumps, rubella, oral polio, BCG, yellow fever, varicella, and TY21 typhoid vaccines? Yes - reviewed with patient and she verbalized understanding    Avoid prolonged sun light, tanning beds or sun lights. Use sun screen. Immunosuppression can increase risk of skin cancers.  - Reviewed with patient and she verbalized understanding    Patient is aware that Belatacept is an immunosuppressant therapy and can increase risk of developing  infections and possibly other  Malignancies? Yes - reviewed with patient and she verbalized understanding     /86   Pulse 92   Temp 98.6 °F (37 °C) (Oral)   Resp 16   Ht 5' 3\" (1.6 m)   Wt 232 lb (105.2 kg)   SpO2 100%   BMI 41.10 kg/m²     Pre Infusion Checklist       Patient given a copy of the medication guide:  Yes - given at previous visit and given via AVS today       Patient counseled about increased risk for:    Post-Transplant Lymphoproliferative Disorder (PTLD), predominantly involving the  CNS:  Yes                                                  Increased risk of Progressive Multifocal Leukoencephalopathy (PML), a CNS Infection:  Yes                                                                                                   Over the past month, have you had any new or worsening medical problems such as a new or sudden change in your thinking, memory, speech, mood, behavior, vision, balance, strength, or other problems? No - patient denies these symptoms                        Over the past month, have you had any new or worsening symptoms such as fever, night sweats, tiredness that does not go away, weight loss or swollen glands? No - patient denies these symptoms                   Patient counseled to report any of these symptoms to physician right away. - Reviewed with patient and she verbalized understanding    Patient given Belatacept 500 mg IVPB over 30 min IVPB using a .22 micron filter per protocol. Response to treatment:  Well tolerated by patient. Education: Verbal and written discharge instructions reviewed with patient. Verbalized understanding. Written copy given via AVS      Patient to return for next Belatacept infusion on August 17, 2022.      Electronically signed by Charissa Valdez RN on 7/20/2022 at 9:12 PM

## 2022-08-15 ENCOUNTER — HOSPITAL ENCOUNTER (OUTPATIENT)
Dept: INFUSION THERAPY | Age: 42
Setting detail: INFUSION SERIES
Discharge: HOME | End: 2022-08-15
Payer: MEDICARE

## 2022-08-15 VITALS
DIASTOLIC BLOOD PRESSURE: 86 MMHG | BODY MASS INDEX: 41.6 KG/M2 | HEART RATE: 84 BPM | TEMPERATURE: 98 F | RESPIRATION RATE: 16 BRPM | HEIGHT: 63 IN | SYSTOLIC BLOOD PRESSURE: 122 MMHG | OXYGEN SATURATION: 99 % | WEIGHT: 234.79 LBS

## 2022-08-15 DIAGNOSIS — Z94.0 KIDNEY REPLACED BY TRANSPLANT: Primary | ICD-10-CM

## 2022-08-15 PROCEDURE — 6360000002 HC RX W HCPCS: Performed by: INTERNAL MEDICINE

## 2022-08-15 PROCEDURE — 2580000003 HC RX 258: Performed by: INTERNAL MEDICINE

## 2022-08-15 PROCEDURE — 96365 THER/PROPH/DIAG IV INF INIT: CPT

## 2022-08-15 RX ORDER — SODIUM CHLORIDE 0.9 % (FLUSH) 0.9 %
10 SYRINGE (ML) INJECTION PRN
Status: CANCELLED
Start: 2022-09-12

## 2022-08-15 RX ORDER — SODIUM CHLORIDE 0.9 % (FLUSH) 0.9 %
10 SYRINGE (ML) INJECTION PRN
Status: DISCONTINUED | OUTPATIENT
Start: 2022-08-15 | End: 2022-08-16 | Stop reason: HOSPADM

## 2022-08-15 RX ORDER — TRAMADOL HYDROCHLORIDE 50 MG/1
50 TABLET ORAL EVERY 6 HOURS PRN
COMMUNITY
End: 2022-09-15

## 2022-08-15 RX ADMIN — SODIUM CHLORIDE, PRESERVATIVE FREE 10 ML: 5 INJECTION INTRAVENOUS at 11:22

## 2022-08-15 RX ADMIN — SODIUM CHLORIDE, PRESERVATIVE FREE 10 ML: 5 INJECTION INTRAVENOUS at 11:36

## 2022-08-15 RX ADMIN — DEXTROSE MONOHYDRATE 500 MG: 50 INJECTION, SOLUTION INTRAVENOUS at 10:40

## 2022-08-15 RX ADMIN — SODIUM CHLORIDE, PRESERVATIVE FREE 10 ML: 5 INJECTION INTRAVENOUS at 10:32

## 2022-08-15 ASSESSMENT — PAIN DESCRIPTION - DESCRIPTORS
DESCRIPTORS: SHARP;SHOOTING;ACHING
DESCRIPTORS: SHARP;ACHING

## 2022-08-15 ASSESSMENT — PAIN DESCRIPTION - ONSET
ONSET: ON-GOING
ONSET: ON-GOING

## 2022-08-15 ASSESSMENT — PAIN DESCRIPTION - ORIENTATION
ORIENTATION: RIGHT
ORIENTATION: RIGHT

## 2022-08-15 ASSESSMENT — PAIN SCALES - GENERAL
PAINLEVEL_OUTOF10: 5
PAINLEVEL_OUTOF10: 7

## 2022-08-15 ASSESSMENT — PAIN DESCRIPTION - LOCATION
LOCATION: ARM
LOCATION: ARM

## 2022-08-15 ASSESSMENT — PAIN DESCRIPTION - FREQUENCY
FREQUENCY: CONTINUOUS
FREQUENCY: CONTINUOUS

## 2022-08-15 ASSESSMENT — PAIN DESCRIPTION - PAIN TYPE
TYPE: ACUTE PAIN
TYPE: ACUTE PAIN

## 2022-08-15 ASSESSMENT — PAIN - FUNCTIONAL ASSESSMENT
PAIN_FUNCTIONAL_ASSESSMENT: PREVENTS OR INTERFERES SOME ACTIVE ACTIVITIES AND ADLS
PAIN_FUNCTIONAL_ASSESSMENT: PREVENTS OR INTERFERES SOME ACTIVE ACTIVITIES AND ADLS

## 2022-08-15 NOTE — PROGRESS NOTES
Outpatient [address] [facility]    Belatacept Visit    NAME:  Edna Ye OF BIRTH:  1980  MEDICAL RECORD NUMBER:  [id number]  DATE:  8/15/2022    Patient arrived to [facility] 58   [] per wheelchair   [x] ambulatory       Diagnosis: Kidney transplant August 30, 2016    Alert and oriented x 4. Patient wearing sling on right arm and reports that she had right shoulder surgery - orthoscopic- on August 4, 2022. States that she is just now feeling better but that she is still having pain when she moves her arm at all. Denies side effects from previous Belatacept infusion and denies signs/symptoms of kidney rejection. Patient denies signs/symptoms of respiratory infection, has received Covid-19 vaccination and is wearing a mask to prevent the spread of Covid-19. Patient Active Problem List   Diagnosis    Kidney replaced by transplant    Hypercalcemia    High serum parathyroid hormone (PTH)    Hypophosphatemia     Allergies   Allergen Reactions    Dilaudid [Hydromorphone Hcl]     Morphine     Vancomycin Itching    Zithromax [Azithromycin]     Dermabond Rash       Patient needs to be seropositive to EBV prior to Belatacept Infusion. Patient is EBV seropositive? Yes    Has patient had TB skin test or Quantiferon Gold TB test recently? No - does not need to have this done per Dr Rani Ramsey    Has patient had any vaccines given recently? No    Has patient been instructed to avoid any live vaccine administrations, such as but not limited to : intranasal influenza, measles, mumps, rubella, oral polio, BCG, yellow fever, varicella, and TY21 typhoid vaccines? Yes - reviewed with patient and she verbalized understanding    Avoid prolonged sun light, tanning beds or sun lights. Use sun screen. Immunosuppression can increase risk of skin cancers.  - Reviewed with patient and she verbalized understanding    Patient is aware that Belatacept is an immunosuppressant therapy and can increase risk of developing  infections and possibly other  Malignancies? Yes - reviewed with patient and she verbalized understanding     /86   Pulse 84   Temp 98 °F (36.7 °C) (Oral)   Resp 16   Ht 5' 3\" (1.6 m)   Wt 234 lb 12.6 oz (106.5 kg)   SpO2 99%   BMI 41.59 kg/m²     Pre Infusion Checklist       Patient given a copy of the medication guide:  Yes - given at previous visit via AVS       Patient counseled about increased risk for:    Post-Transplant Lymphoproliferative Disorder (PTLD), predominantly involving the  CNS:  Yes                                                  Increased risk of Progressive Multifocal Leukoencephalopathy (PML), a CNS Infection:  Yes                                                                                                   Over the past month, have you had any new or worsening medical problems such as a new or sudden change in your thinking, memory, speech, mood, behavior, vision, balance, strength, or other problems? No - reviewed and patient denies these symptoms                   Over the past month, have you had any new or worsening symptoms such as fever, night sweats, tiredness that does not go away, weight loss or swollen glands? No - patient denies these symptoms                   Patient counseled to report any of these symptoms to physician right away. Patient given Belatacept 500 mg IVPB over 30 min IVPB using a .22 micron filter per protocol. Response to treatment:  Well tolerated by patient. Education: Verbal and written discharge instructions reviewed with patient. Verbalized understanding. Written copy given via AVS     Patient to return on September 14, 2022 for next Belatacept infusion.      Electronically signed by Naveen Barr RN on 8/15/2022 at 12:26 PM

## 2022-09-13 ENCOUNTER — HOSPITAL ENCOUNTER (OUTPATIENT)
Dept: INFUSION THERAPY | Age: 42
Setting detail: INFUSION SERIES
Discharge: HOME | End: 2022-09-13

## 2022-09-15 ENCOUNTER — HOSPITAL ENCOUNTER (OUTPATIENT)
Dept: INFUSION THERAPY | Age: 42
Setting detail: INFUSION SERIES
Discharge: HOME | End: 2022-09-15
Payer: MEDICARE

## 2022-09-15 VITALS
OXYGEN SATURATION: 97 % | WEIGHT: 232 LBS | RESPIRATION RATE: 16 BRPM | DIASTOLIC BLOOD PRESSURE: 88 MMHG | HEIGHT: 63 IN | BODY MASS INDEX: 41.11 KG/M2 | SYSTOLIC BLOOD PRESSURE: 133 MMHG | TEMPERATURE: 97.9 F | HEART RATE: 85 BPM

## 2022-09-15 DIAGNOSIS — Z94.0 KIDNEY REPLACED BY TRANSPLANT: Primary | ICD-10-CM

## 2022-09-15 PROCEDURE — 96365 THER/PROPH/DIAG IV INF INIT: CPT

## 2022-09-15 PROCEDURE — 2580000003 HC RX 258: Performed by: INTERNAL MEDICINE

## 2022-09-15 PROCEDURE — 6360000002 HC RX W HCPCS: Performed by: INTERNAL MEDICINE

## 2022-09-15 RX ORDER — SODIUM CHLORIDE 0.9 % (FLUSH) 0.9 %
10 SYRINGE (ML) INJECTION PRN
Status: CANCELLED
Start: 2022-10-13

## 2022-09-15 RX ORDER — SODIUM CHLORIDE 0.9 % (FLUSH) 0.9 %
10 SYRINGE (ML) INJECTION PRN
Status: DISCONTINUED | OUTPATIENT
Start: 2022-09-15 | End: 2022-09-16 | Stop reason: HOSPADM

## 2022-09-15 RX ADMIN — SODIUM CHLORIDE, PRESERVATIVE FREE 10 ML: 5 INJECTION INTRAVENOUS at 11:12

## 2022-09-15 RX ADMIN — SODIUM CHLORIDE, PRESERVATIVE FREE 10 ML: 5 INJECTION INTRAVENOUS at 10:56

## 2022-09-15 RX ADMIN — SODIUM CHLORIDE, PRESERVATIVE FREE 10 ML: 5 INJECTION INTRAVENOUS at 10:14

## 2022-09-15 RX ADMIN — DEXTROSE MONOHYDRATE 500 MG: 50 INJECTION, SOLUTION INTRAVENOUS at 10:18

## 2022-09-15 ASSESSMENT — PAIN SCALES - GENERAL
PAINLEVEL_OUTOF10: 2
PAINLEVEL_OUTOF10: 0

## 2022-09-15 ASSESSMENT — PAIN DESCRIPTION - DESCRIPTORS: DESCRIPTORS: SHARP;SHOOTING

## 2022-09-15 ASSESSMENT — PAIN DESCRIPTION - PAIN TYPE: TYPE: ACUTE PAIN

## 2022-09-15 ASSESSMENT — PAIN DESCRIPTION - LOCATION: LOCATION: ARM

## 2022-09-15 ASSESSMENT — PAIN - FUNCTIONAL ASSESSMENT: PAIN_FUNCTIONAL_ASSESSMENT: PREVENTS OR INTERFERES SOME ACTIVE ACTIVITIES AND ADLS

## 2022-09-15 ASSESSMENT — PAIN DESCRIPTION - ONSET: ONSET: ON-GOING

## 2022-09-15 ASSESSMENT — PAIN DESCRIPTION - ORIENTATION: ORIENTATION: RIGHT

## 2022-09-15 ASSESSMENT — PAIN DESCRIPTION - FREQUENCY: FREQUENCY: INTERMITTENT

## 2022-09-15 NOTE — DISCHARGE INSTRUCTIONS
Outpatient Infusion Discharge Instructions  [address] [address] [address] [place], [address]  Telephone: [phone]    NAME:  Darrel Bryant OF BIRTH:  1980  MEDICAL RECORD NUMBER:  [id number]  DATE:  Sept. 15, 2022    Reason for Outpatient Infusion Visit: Belatacept infusion    If you develop any these symptoms please contact you Doctor    [x] Nausea and/or vomiting not relieved with medication   [x] Swelling, redness, and/or bleeding at injection or IV site    [x] Fever or chills  [x] Rash or itching   [x] Shortness of breath  [x] Please review After Visit Summary (AVS) information on: Belatacept and kidney transplant    [x] Other: Next appt - October 12, 2022      Outpatient [facility] Information: Should you experience any significant changes in your health or have questions about your care please contact the [address] at [address] 8:00 am - 4:00 pm.  If you need help outside these hours and cannot wait until we are again available, contact your Primary Care Physician or go to the hospital emergency room. Discharge Instructions for Belatacept    Please report any of these symptoms to your doctor right away:  Changes in mood or usual behavior  Confusion, problems thinking, loss of memory  Changes in walking or talking  Decreased strength or weakness on one side of body  Changes in vision  Fever, night sweats or tiredness that does not go away  Weight loss, swollen glands  Flu, cold symptoms or cough  Stomach are pain  Vomiting or diarrhea  Tenderness over your transplanted kidney  Change in the amount of urine that you make, blood in urine, pain or burning on urination. A new skin lesion or bump, or change is size or color of a mole    Call the infusion Center, [phone] if you are sick or have any of the above symptoms several days before your next appointment.      Electronically signed by

## 2022-09-15 NOTE — PROGRESS NOTES
Outpatient [address] [facility]    Belatacept Visit    NAME:  Timothy Loera OF BIRTH:  1980  MEDICAL RECORD NUMBER:  [id number]  DATE:  9/15/2022    Patient arrived to [facility] 58   [] per wheelchair   [x] ambulatory       Diagnosis: Kidney transplant - August 30, 2016    Alert and oriented x 4. Patient reports that her right shoulder is healing well since her surgery on August 4, 2022. Reports that she still has some pain with movement and when she tries to sleep. Patient reports that she is having weight loss surgery the first week of October so she is starting a liquid diet next week. Patient denies side effects from previous Belatacept infusions and denies signs/symptoms of kidney rejection. Patient denies signs/symptoms of respiratory infection, has received Covid-19 vaccination and is wearing a mask to prevent the spread of Covid-19. Patient Active Problem List   Diagnosis    Kidney replaced by transplant    Hypercalcemia    High serum parathyroid hormone (PTH)    Hypophosphatemia     Allergies   Allergen Reactions    Dilaudid [Hydromorphone Hcl]     Morphine     Vancomycin Itching    Zithromax [Azithromycin]     Dermabond Rash       Patient needs to be seropositive to EBV prior to Belatacept Infusion. Patient is EBV seropositive? Yes    Has patient had TB skin test or Quantiferon Gold TB test recently? No - does not need to have this done per Dr Maria Eugenia Elise. Has patient had any vaccines given recently? No    Has patient been instructed to avoid any live vaccine administrations, such as but not limited to : intranasal influenza, measles, mumps, rubella, oral polio, BCG, yellow fever, varicella, and TY21 typhoid vaccines? Yes - reviewed with patient and she verbalizes understanding    Avoid prolonged sun light, tanning beds or sun lights. Use sun screen. Immunosuppression can increase risk of skin cancers.  - reviewed with patient and she verbalized understanding    Patient is aware that Belatacept is an immunosuppressant therapy and can increase risk of developing  infections and possibly other  Malignancies? Yes - reviewed and patient verbalizes understanding     /85   Pulse 88   Temp 97.8 °F (36.6 °C) (Oral)   Resp 16   Ht 5' 3\" (1.6 m)   Wt 232 lb (105.2 kg)   BMI 41.10 kg/m²     Pre Infusion Checklist       Patient given a copy of the medication guide:  Yes - given at previous visit and via AVS today. Patient counseled about increased risk for:    Post-Transplant Lymphoproliferative Disorder (PTLD), predominantly involving the  CNS:  Yes                                                  Increased risk of Progressive Multifocal Leukoencephalopathy (PML), a CNS Infection:  Yes                                                                                                   Over the past month, have you had any new or worsening medical problems such as a new or sudden change in your thinking, memory, speech, mood, behavior, vision, balance, strength, or other problems? No - patient denies these symptoms                        Over the past month, have you had any new or worsening symptoms such as fever, night sweats, tiredness that does not go away, weight loss or swollen glands? No - patient denies these symptoms                   Patient counseled to report any of these symptoms to physician right away. - yes    Patient given Belatacept 500 mg IVPB over 30 min IVPB using a .22 micron filter per protocol. Response to treatment:  Well tolerated by patient. Education: Verbal and written discharge instructions reviewed with patient. Verbalized understanding. Written copy given via AVS      Patient to return October 12, 2022 for next Belatacept infusion.      Electronically signed by Charissa Valdez RN on 9/15/2022 at 12:05 PM

## 2022-10-12 ENCOUNTER — HOSPITAL ENCOUNTER (OUTPATIENT)
Dept: INFUSION THERAPY | Age: 42
Setting detail: INFUSION SERIES
Discharge: HOME | End: 2022-10-12
Payer: MEDICARE

## 2022-10-12 VITALS
BODY MASS INDEX: 38.66 KG/M2 | RESPIRATION RATE: 18 BRPM | HEART RATE: 91 BPM | DIASTOLIC BLOOD PRESSURE: 80 MMHG | TEMPERATURE: 98.1 F | WEIGHT: 218.26 LBS | OXYGEN SATURATION: 97 % | SYSTOLIC BLOOD PRESSURE: 112 MMHG

## 2022-10-12 DIAGNOSIS — Z94.0 KIDNEY REPLACED BY TRANSPLANT: Primary | ICD-10-CM

## 2022-10-12 PROCEDURE — 6360000002 HC RX W HCPCS: Performed by: INTERNAL MEDICINE

## 2022-10-12 PROCEDURE — 96365 THER/PROPH/DIAG IV INF INIT: CPT

## 2022-10-12 PROCEDURE — 2580000003 HC RX 258: Performed by: INTERNAL MEDICINE

## 2022-10-12 RX ORDER — SODIUM CHLORIDE 0.9 % (FLUSH) 0.9 %
10 SYRINGE (ML) INJECTION PRN
Status: DISCONTINUED | OUTPATIENT
Start: 2022-10-12 | End: 2022-10-13 | Stop reason: HOSPADM

## 2022-10-12 RX ORDER — SODIUM CHLORIDE 0.9 % (FLUSH) 0.9 %
10 SYRINGE (ML) INJECTION PRN
Start: 2022-11-09

## 2022-10-12 RX ORDER — PANTOPRAZOLE SODIUM 20 MG/1
20 TABLET, DELAYED RELEASE ORAL DAILY
COMMUNITY

## 2022-10-12 RX ADMIN — DEXTROSE MONOHYDRATE 500 MG: 50 INJECTION, SOLUTION INTRAVENOUS at 11:02

## 2022-10-12 RX ADMIN — SODIUM CHLORIDE, PRESERVATIVE FREE 20 ML: 5 INJECTION INTRAVENOUS at 11:58

## 2022-10-12 RX ADMIN — SODIUM CHLORIDE, PRESERVATIVE FREE 10 ML: 5 INJECTION INTRAVENOUS at 11:01

## 2022-10-12 RX ADMIN — SODIUM CHLORIDE, PRESERVATIVE FREE 10 ML: 5 INJECTION INTRAVENOUS at 11:40

## 2022-10-12 RX ADMIN — SODIUM CHLORIDE, PRESERVATIVE FREE 10 ML: 5 INJECTION INTRAVENOUS at 10:38

## 2022-10-12 ASSESSMENT — PAIN SCALES - GENERAL
PAINLEVEL_OUTOF10: 0

## 2022-10-12 NOTE — DISCHARGE INSTRUCTIONS
Outpatient Infusion Discharge Instructions  [facility]  [address] [address] [address]  Telephone: [phone]    NAME:  Yashira Dorman OF BIRTH:  1980  MEDICAL RECORD NUMBER:  [id number]  DATE:  October 12, 2022    Reason for Outpatient Infusion Visit: Belatacept infusion    If you develop any these symptoms please contact you Doctor    [x] Nausea and/or vomiting not relieved with medication   [x] Swelling, redness, and/or bleeding at injection or IV site    [x] Fever or chills  [x] Rash or itching   [x] Shortness of breath  [x] Please review After Visit Summary (AVS) information on : Belatacept, Infection prevention, handwashing   [x] Other: Next appt - Nov. 9, 2022    Discharge Instructions for Belatacept    Please report any of these symptoms to your doctor right away:  Changes in mood or usual behavior  Confusion, problems thinking, loss of memory  Changes in walking or talking  Decreased strength or weakness on one side of body  Changes in vision  Fever, night sweats or tiredness that does not go away  Weight loss, swollen glands  Flu, cold symptoms or cough  Stomach are pain  Vomiting or diarrhea  Tenderness over your transplanted kidney  Change in the amount of urine that you make, blood in urine, pain or burning on urination. A new skin lesion or bump, or change is size or color of a mole    Call the infusion Center, [phone] if you are sick or have any of the above symptoms several days before your next appointment. Outpatient Infusion Center Information: Should you experience any significant changes in your health or have questions about your care please contact the [address] at [address] 8:00 am - 4:00 pm.  If you need help outside these hours and cannot wait until we are again available, contact your Primary Care Physician or go to the hospital emergency room.        Electronically signed by Eligio Deluna RN on 10/12/2022 at 12:06 PM

## 2022-10-12 NOTE — PROGRESS NOTES
Outpatient [address] [facility]    Belatacept Visit    NAME:  Niharika Hazel OF BIRTH:  1980  MEDICAL RECORD NUMBER:  [id number]  DATE:  10/12/2022    Patient arrived to [facility] 58   [] per wheelchair   [x] ambulatory       Diagnosis: Kidney transplant - August 30, 2016. Alert and oriented x 4. Patient reports that she had gastric sleeve surgery on October 6, 2022 and that she has lost about 15 lbs since the surgery. Patient's current weight is 218 lbs. Reviewed dosing parameters with patient and made her aware to call if her weight drops below parameters. Patient verbalized understanding. Patient denies side effects from previous Belatacept infusions and denies signs/symptoms of kidney rejection. Patient Active Problem List   Diagnosis    Kidney replaced by transplant    Hypercalcemia    High serum parathyroid hormone (PTH)    Hypophosphatemia     Allergies   Allergen Reactions    Dilaudid [Hydromorphone Hcl]     Morphine     Vancomycin Itching    Zithromax [Azithromycin]     Dermabond Rash       Patient needs to be seropositive to EBV prior to Belatacept Infusion. Patient is EBV seropositive? Yes    Has patient had TB skin test or Quantiferon Gold TB test recently? No - does not need to have this done per Dr Caridad Mishra    Has patient had any vaccines given recently? No    Has patient been instructed to avoid any live vaccine administrations, such as but not limited to : intranasal influenza, measles, mumps, rubella, oral polio, BCG, yellow fever, varicella, and TY21 typhoid vaccines? Yes - reviewed with patient and she verbalized understanding    Avoid prolonged sun light, tanning beds or sun lights. Use sun screen. Immunosuppression can increase risk of skin cancers.  - Reviewed with patient and she verbalized understanding    Patient is aware that Belatacept is an immunosuppressant therapy and can increase risk of developing  infections and possibly other Malignancies? Yes - reviewed and patient verbalized understanding     /80   Pulse 91   Temp 98.1 °F (36.7 °C) (Oral)   Resp 18   Wt 218 lb 4.1 oz (99 kg)   SpO2 97%   BMI 38.66 kg/m²     Pre Infusion Checklist       Patient given a copy of the medication guide:  Yes - given at previous visit and via AVS today       Patient counseled about increased risk for:    Post-Transplant Lymphoproliferative Disorder (PTLD), predominantly involving the  CNS:  Yes                                                  Increased risk of Progressive Multifocal Leukoencephalopathy (PML), a CNS Infection:  Yes                                                                                                   Over the past month, have you had any new or worsening medical problems such as a new or sudden change in your thinking, memory, speech, mood, behavior, vision, balance, strength, or other problems? No - patient denies these symptoms                         Over the past month, have you had any new or worsening symptoms such as fever, night sweats, tiredness that does not go away, weight loss or swollen glands? Patient has weight loss but this is due to recent gastric sleeve surgery - weight loss is intentional. Patient also states that she has had night sweats for many years                   Patient counseled to report any of these symptoms to physician right away. Patient given Belatacept 500 mg IVPB over 30 min IVPB using a .22 micron filter per protocol. Response to treatment:  Well tolerated by patient. Education:  Verbal and written discharge instructions reviewed with patient. Verbalized understanding. Written copy given via AVS     Patient to return for next Belatacept infusion on November 9, 2022.      Electronically signed by Andrés Garcia RN on 10/12/2022 at 12:46 PM

## 2022-11-07 ENCOUNTER — HOSPITAL ENCOUNTER (OUTPATIENT)
Dept: INFUSION THERAPY | Age: 42
Setting detail: INFUSION SERIES
Discharge: HOME | End: 2022-11-07

## 2022-11-09 ENCOUNTER — HOSPITAL ENCOUNTER (OUTPATIENT)
Dept: INFUSION THERAPY | Age: 42
Discharge: HOME | End: 2022-11-09

## 2022-11-09 ENCOUNTER — HOSPITAL ENCOUNTER (OUTPATIENT)
Dept: INFUSION THERAPY | Age: 42
Setting detail: INFUSION SERIES
Discharge: HOME | End: 2022-11-09
Payer: MEDICARE

## 2022-11-09 VITALS
TEMPERATURE: 97.8 F | BODY MASS INDEX: 36.31 KG/M2 | WEIGHT: 205 LBS | SYSTOLIC BLOOD PRESSURE: 113 MMHG | DIASTOLIC BLOOD PRESSURE: 80 MMHG | HEART RATE: 97 BPM | OXYGEN SATURATION: 98 % | RESPIRATION RATE: 16 BRPM

## 2022-11-09 DIAGNOSIS — Z94.0 KIDNEY REPLACED BY TRANSPLANT: Primary | ICD-10-CM

## 2022-11-09 PROCEDURE — 2580000003 HC RX 258: Performed by: INTERNAL MEDICINE

## 2022-11-09 PROCEDURE — 96365 THER/PROPH/DIAG IV INF INIT: CPT

## 2022-11-09 PROCEDURE — 6360000002 HC RX W HCPCS: Performed by: INTERNAL MEDICINE

## 2022-11-09 RX ORDER — SODIUM CHLORIDE 0.9 % (FLUSH) 0.9 %
10 SYRINGE (ML) INJECTION PRN
Start: 2022-12-07

## 2022-11-09 RX ORDER — SODIUM CHLORIDE 0.9 % (FLUSH) 0.9 %
10 SYRINGE (ML) INJECTION PRN
Status: DISCONTINUED | OUTPATIENT
Start: 2022-11-09 | End: 2022-11-10 | Stop reason: HOSPADM

## 2022-11-09 RX ADMIN — Medication 10 ML: at 09:59

## 2022-11-09 RX ADMIN — Medication 20 ML: at 10:40

## 2022-11-09 RX ADMIN — DEXTROSE MONOHYDRATE 500 MG: 50 INJECTION, SOLUTION INTRAVENOUS at 10:01

## 2022-11-09 NOTE — PROGRESS NOTES
Outpatient [facility] 1979    Belatacept Visit    NAME:  Quinn Hernandez OF BIRTH:  1980  MEDICAL RECORD NUMBER:  [id number]  DATE:  11/9/2022    Patient arrived to [facility] 58   [] per wheelchair   [x] ambulatory       Diagnosis: kidney transplant    Patient Active Problem List   Diagnosis    Kidney replaced by transplant    Hypercalcemia    High serum parathyroid hormone (PTH)    Hypophosphatemia     Allergies   Allergen Reactions    Dilaudid [Hydromorphone Hcl]     Morphine     Vancomycin Itching    Zithromax [Azithromycin]     Dermabond Rash       Patient needs to be seropositive to EBV prior to Belatacept Infusion. Patient is EBV seropositive? Yes    Has patient had TB skin test or Quantiferon Gold TB test recently? No    Has patient had any vaccines given recently? Yes flu two weeks go    Has patient been instructed to avoid any live vaccine administrations, such as but not limited to : intranasal influenza, measles, mumps, rubella, oral polio, BCG, yellow fever, varicella, and TY21 typhoid vaccines? Yes    Avoid prolonged sun light, tanning beds or sun lights. Use sun screen. Immunosuppression can increase risk of skin cancers. Patient is aware that Belatacept is an immunosuppressant therapy and can increase risk of developing  infections and possibly other  Malignancies?   Yes     /80   Pulse 97   Temp 97.8 °F (36.6 °C) (Oral)   Resp 16   Wt 205 lb (93 kg) Comment: pt had weight loss surgery  SpO2 98%   BMI 36.31 kg/m²     Pre Infusion Checklist       Patient given a copy of the medication guide:  Yes       Patient counseled about increased risk for:    Post-Transplant Lymphoproliferative Disorder (PTLD), predominantly involving the  CNS:  Yes                                                  Increased risk of Progressive Multifocal Leukoencephalopathy (PML), a CNS Infection:  Yes Over the past month, have you had any new or worsening medical problems such as a new or sudden change in your thinking, memory, speech, mood, behavior, vision, balance, strength, or other problems? No                         Over the past month, have you had any new or worsening symptoms such as fever, night sweats, tiredness that does not go away, weight loss or swollen glands? No                   Patient counseled to report any of these symptoms to physician right away. Patient given Belatacept 500 mg IVPB over 30 min IVPB using a .22 micron filter per protocol. Response to treatment:  Well tolerated by patient.     Education:    Indicates understanding       Electronically signed by Jamie Grimaldo RN on 11/9/2022 at 9:34 AM

## 2022-12-08 ENCOUNTER — HOSPITAL ENCOUNTER (OUTPATIENT)
Dept: INFUSION THERAPY | Age: 42
Setting detail: INFUSION SERIES
Discharge: HOME | End: 2022-12-08
Payer: MEDICARE

## 2022-12-08 VITALS
DIASTOLIC BLOOD PRESSURE: 69 MMHG | BODY MASS INDEX: 34.54 KG/M2 | TEMPERATURE: 97.4 F | RESPIRATION RATE: 16 BRPM | WEIGHT: 195 LBS | SYSTOLIC BLOOD PRESSURE: 144 MMHG | HEART RATE: 97 BPM

## 2022-12-08 DIAGNOSIS — Z94.0 KIDNEY REPLACED BY TRANSPLANT: Primary | ICD-10-CM

## 2022-12-08 PROCEDURE — 6360000002 HC RX W HCPCS: Performed by: INTERNAL MEDICINE

## 2022-12-08 PROCEDURE — 2580000003 HC RX 258: Performed by: INTERNAL MEDICINE

## 2022-12-08 PROCEDURE — 96365 THER/PROPH/DIAG IV INF INIT: CPT

## 2022-12-08 RX ORDER — SODIUM CHLORIDE 0.9 % (FLUSH) 0.9 %
10 SYRINGE (ML) INJECTION PRN
Start: 2023-01-05

## 2022-12-08 RX ORDER — SODIUM CHLORIDE 0.9 % (FLUSH) 0.9 %
10 SYRINGE (ML) INJECTION PRN
Status: DISCONTINUED | OUTPATIENT
Start: 2022-12-08 | End: 2022-12-09 | Stop reason: HOSPADM

## 2022-12-08 RX ADMIN — DEXTROSE MONOHYDRATE 500 MG: 50 INJECTION, SOLUTION INTRAVENOUS at 10:18

## 2022-12-08 NOTE — DISCHARGE INSTRUCTIONS
Outpatient Infusion Discharge Instructions  [facility]  [address] [facility] [address] [address] [place] [address]  Telephone: [phone]    NAME:  Jennifer Daigle OF BIRTH:  1980  MEDICAL RECORD NUMBER:  [id number]  DATE:  @ED@    Reason for Outpatient Infusion Visit: ***    If you develop any these symptoms please contact you Doctor    [] Nausea and/or vomiting not relieved with medication   [] Swelling, redness, and/or bleeding at injection or IV site    [] Fever or chills  [] Rash or itching   [] Shortness of breath  [] Please review After Visit Summary (AVS) information on    [] Other      Outpatient [address]: Should you experience any significant changes in your health or have questions about your care please contact the [address] at [address] 8:00 am - 4:00 pm.  If you need help outside these hours and cannot wait until we are again available, contact your Primary Care Physician or go to the hospital emergency room.        Electronically signed by Naresh Chaparro RN on 12/8/2022 at 10:33 AM Outpatient Infusion Discharge Instructions  [facility]  [address] [place] [address]  Telephone: [phone]    NAME:  Ingrid Davies:  1980  MEDICAL RECORD NUMBER:  [id number]  DATE:  [de-identified]    Reason for Outpatient Infusion Visit: ***    If you develop any these symptoms please contact you Doctor    [] Nausea and/or vomiting not relieved with medication   [] Swelling, redness, and/or bleeding at injection or IV site    [] Fever or chills  [] Rash or itching   [] Shortness of breath  [] Please review After Visit Summary (AVS) information on    [] Other      Outpatient [address]: Should you experience any significant changes in your health or have questions about your care please contact the [address] at [address] 8:00 am - 4:00 pm.  If you need help outside these hours and cannot wait until we are again available, contact your Primary Care Physician or go to the hospital emergency room.        Electronically signed by Burgess Carlotta RN on 12/8/2022 at 10:33 AM

## 2022-12-08 NOTE — PROGRESS NOTES
Outpatient [address] [facility]    Belatacept Visit    NAME:  Dalila Rucker OF BIRTH:  1980  MEDICAL RECORD NUMBER:  [id number]  DATE:  12/8/2022    Patient arrived to [facility] 58   [] per wheelchair   [x] ambulatory       Diagnosis: kidney transplat    Patient Active Problem List   Diagnosis    Kidney replaced by transplant    Hypercalcemia    High serum parathyroid hormone (PTH)    Hypophosphatemia     Allergies   Allergen Reactions    Dilaudid [Hydromorphone Hcl]     Morphine     Vancomycin Itching    Zithromax [Azithromycin]     Dermabond Rash       Patient needs to be seropositive to EBV prior to Belatacept Infusion. Patient is EBV seropositive? Yes    Has patient had TB skin test or Quantiferon Gold TB test recently? No    Has patient had any vaccines given recently? No    Has patient been instructed to avoid any live vaccine administrations, such as but not limited to : intranasal influenza, measles, mumps, rubella, oral polio, BCG, yellow fever, varicella, and TY21 typhoid vaccines? Yes    Avoid prolonged sun light, tanning beds or sun lights. Use sun screen. Immunosuppression can increase risk of skin cancers. yes    Patient is aware that Belatacept is an immunosuppressant therapy and can increase risk of developing  infections and possibly other  Malignancies?   Yes     BP (!) 144/69   Pulse 97   Temp 97.4 °F (36.3 °C) (Oral)   Resp 16   Wt 195 lb (88.5 kg)   BMI 34.54 kg/m²     Pre Infusion Checklist       Patient given a copy of the medication guide:  Yes       Patient counseled about increased risk for:    Post-Transplant Lymphoproliferative Disorder (PTLD), predominantly involving the  CNS:  Yes                                                  Increased risk of Progressive Multifocal Leukoencephalopathy (PML), a CNS Infection:  Yes                                                                                                   Over the past month, have you had any new or worsening medical problems such as a new or sudden change in your thinking, memory, speech, mood, behavior, vision, balance, strength, or other problems? Yes                          Over the past month, have you had any new or worsening symptoms such as fever, night sweats, tiredness that does not go away, weight loss or swollen glands? No                   Patient counseled to report any of these symptoms to physician right away. Patient given Belatacept 500 mg IVPB over 30 min IVPB using a .22 micron filter per protocol. Response to treatment:  Well tolerated by patient.     Education:    Indicates understanding       Electronically signed by Pura Zhu RN on 12/8/2022 at 10:35 AM

## 2023-01-05 ENCOUNTER — HOSPITAL ENCOUNTER (OUTPATIENT)
Dept: INFUSION THERAPY | Age: 43
Setting detail: INFUSION SERIES
Discharge: HOME | End: 2023-01-05

## 2023-01-09 ENCOUNTER — TELEPHONE (OUTPATIENT)
Dept: INFUSION THERAPY | Age: 43
End: 2023-01-09

## 2023-01-09 ENCOUNTER — HOSPITAL ENCOUNTER (OUTPATIENT)
Dept: INFUSION THERAPY | Age: 43
Setting detail: INFUSION SERIES
Discharge: HOME | End: 2023-01-09

## 2023-01-09 NOTE — TELEPHONE ENCOUNTER
Patient called to say she has Covid and is on Paxlovid currently. We spoke to Dr. Deepika Mckeon and he wants her to wait a week before getting Belatacept which was due last week.  Scheduled patient for Belatacept on 1/16/23

## 2023-01-16 ENCOUNTER — HOSPITAL ENCOUNTER (OUTPATIENT)
Dept: INFUSION THERAPY | Age: 43
Setting detail: INFUSION SERIES
Discharge: HOME | End: 2023-01-16
Payer: COMMERCIAL

## 2023-01-16 VITALS
RESPIRATION RATE: 18 BRPM | HEART RATE: 97 BPM | DIASTOLIC BLOOD PRESSURE: 74 MMHG | BODY MASS INDEX: 32.8 KG/M2 | OXYGEN SATURATION: 97 % | WEIGHT: 185.19 LBS | SYSTOLIC BLOOD PRESSURE: 110 MMHG | TEMPERATURE: 98.3 F

## 2023-01-16 DIAGNOSIS — Z94.0 KIDNEY REPLACED BY TRANSPLANT: Primary | ICD-10-CM

## 2023-01-16 PROCEDURE — 6360000002 HC RX W HCPCS: Performed by: INTERNAL MEDICINE

## 2023-01-16 PROCEDURE — 2580000003 HC RX 258: Performed by: INTERNAL MEDICINE

## 2023-01-16 PROCEDURE — 96365 THER/PROPH/DIAG IV INF INIT: CPT

## 2023-01-16 RX ORDER — SODIUM CHLORIDE 0.9 % (FLUSH) 0.9 %
10 SYRINGE (ML) INJECTION PRN
Status: DISCONTINUED | OUTPATIENT
Start: 2023-01-16 | End: 2023-01-17 | Stop reason: HOSPADM

## 2023-01-16 RX ORDER — SODIUM CHLORIDE 0.9 % (FLUSH) 0.9 %
10 SYRINGE (ML) INJECTION PRN
Start: 2023-01-30

## 2023-01-16 RX ADMIN — DEXTROSE MONOHYDRATE 450 MG: 50 INJECTION, SOLUTION INTRAVENOUS at 10:14

## 2023-01-16 RX ADMIN — SODIUM CHLORIDE, PRESERVATIVE FREE 20 ML: 5 INJECTION INTRAVENOUS at 10:55

## 2023-01-16 NOTE — DISCHARGE INSTRUCTIONS
Discharge Instructions for Belatacept    Please report any of these symptoms to your doctor right away:  Changes in mood or usual behavior  Confusion, problems thinking, loss of memory  Changes in walking or talking  Decreased strength or weakness on one side of body  Changes in vision  Fever, night sweats or tiredness that does not go away  Weight loss, swollen glands  Flu, cold symptoms or cough  Stomach are pain  Vomiting or diarrhea  Tenderness over your transplanted kidney  Change in the amount of urine that you make, blood in urine, pain or burning on urination. A new skin lesion or bump, or change is size or color of a mole    Call the [facility], [phone] if you are sick or have any of the above symptoms several days before your next appointment.

## 2023-01-16 NOTE — PROGRESS NOTES
Outpatient [address] [facility]    Belatacept Visit    NAME:  Rogelio Sears OF BIRTH:  1980  MEDICAL RECORD NUMBER:  [id number]  DATE:  1/16/2023    Patient arrived to [facility] 58   [] per wheelchair   [x] ambulatory       Diagnosis: Kidney Transplant    Patient has had bariatric surgery done 10/2022 and is losing weight. Patient Active Problem List   Diagnosis    Kidney replaced by transplant    Hypercalcemia    High serum parathyroid hormone (PTH)    Hypophosphatemia     Allergies   Allergen Reactions    Dilaudid [Hydromorphone Hcl]     Morphine     Vancomycin Itching    Zithromax [Azithromycin]     Dermabond Rash       Patient needs to be seropositive to EBV prior to Belatacept Infusion. Patient is EBV seropositive? Yes    Has patient had TB skin test or Quantiferon Gold TB test recently? No    Has patient had any vaccines given recently? No    Has patient been instructed to avoid any live vaccine administrations, such as but not limited to : intranasal influenza, measles, mumps, rubella, oral polio, BCG, yellow fever, varicella, and TY21 typhoid vaccines? Yes    Avoid prolonged sun light, tanning beds or sun lights. Use sun screen. Immunosuppression can increase risk of skin cancers. Patient is aware that Belatacept is an immunosuppressant therapy and can increase risk of developing  infections and possibly other  Malignancies?   Yes     /74   Pulse 97   Temp 98.3 °F (36.8 °C) (Oral)   Resp 18   Wt 185 lb 3 oz (84 kg)   SpO2 97%   BMI 32.80 kg/m²     Pre Infusion Checklist       Patient given a copy of the medication guide:  Yes       Patient counseled about increased risk for:    Post-Transplant Lymphoproliferative Disorder (PTLD), predominantly involving the  CNS:  Yes                                                  Increased risk of Progressive Multifocal Leukoencephalopathy (PML), a CNS Infection:  Yes Over the past month, have you had any new or worsening medical problems such as a new or sudden change in your thinking, memory, speech, mood, behavior, vision, balance, strength, or other problems? No                         Over the past month, have you had any new or worsening symptoms such as fever, night sweats, tiredness that does not go away, weight loss or swollen glands? No                   Patient counseled to report any of these symptoms to physician right away. Patient given Belatacept 450 mg IVPB over 30 min IVPB using a .22 micron filter per protocol. Response to treatment:  Well tolerated by patient.     Education:    Indicates understanding    Patient will return for next Belatacept 2/17/23       Electronically signed by Liban Haddad RN on 1/16/2023 at 10:23 AM

## 2023-02-17 ENCOUNTER — HOSPITAL ENCOUNTER (OUTPATIENT)
Dept: INFUSION THERAPY | Age: 43
Setting detail: INFUSION SERIES
Discharge: HOME | End: 2023-02-17
Payer: COMMERCIAL

## 2023-02-17 VITALS
OXYGEN SATURATION: 98 % | SYSTOLIC BLOOD PRESSURE: 105 MMHG | BODY MASS INDEX: 31.13 KG/M2 | DIASTOLIC BLOOD PRESSURE: 68 MMHG | TEMPERATURE: 98 F | WEIGHT: 175.71 LBS | RESPIRATION RATE: 16 BRPM | HEART RATE: 72 BPM

## 2023-02-17 DIAGNOSIS — Z94.0 KIDNEY REPLACED BY TRANSPLANT: Primary | ICD-10-CM

## 2023-02-17 PROCEDURE — 6360000002 HC RX W HCPCS: Performed by: INTERNAL MEDICINE

## 2023-02-17 PROCEDURE — 96365 THER/PROPH/DIAG IV INF INIT: CPT

## 2023-02-17 PROCEDURE — 2580000003 HC RX 258: Performed by: INTERNAL MEDICINE

## 2023-02-17 RX ORDER — SODIUM CHLORIDE 0.9 % (FLUSH) 0.9 %
10 SYRINGE (ML) INJECTION PRN
Start: 2023-03-17

## 2023-02-17 RX ORDER — SODIUM CHLORIDE 0.9 % (FLUSH) 0.9 %
10 SYRINGE (ML) INJECTION PRN
Status: DISCONTINUED | OUTPATIENT
Start: 2023-02-17 | End: 2023-02-18 | Stop reason: HOSPADM

## 2023-02-17 RX ORDER — ELETRIPTAN HYDROBROMIDE 20 MG/1
20 TABLET, FILM COATED ORAL
COMMUNITY

## 2023-02-17 RX ORDER — FREMANEZUMAB-VFRM 225 MG/1.5ML
225 INJECTION SUBCUTANEOUS
COMMUNITY

## 2023-02-17 RX ADMIN — SODIUM CHLORIDE, PRESERVATIVE FREE 10 ML: 5 INJECTION INTRAVENOUS at 13:27

## 2023-02-17 RX ADMIN — SODIUM CHLORIDE, PRESERVATIVE FREE 10 ML: 5 INJECTION INTRAVENOUS at 14:10

## 2023-02-17 RX ADMIN — DEXTROSE MONOHYDRATE 450 MG: 50 INJECTION, SOLUTION INTRAVENOUS at 13:35

## 2023-02-17 RX ADMIN — SODIUM CHLORIDE, PRESERVATIVE FREE 10 ML: 5 INJECTION INTRAVENOUS at 14:24

## 2023-02-17 NOTE — DISCHARGE INSTRUCTIONS
Outpatient Infusion Discharge Instructions  [address] [address] [place], [address]  Telephone: [phone]    NAME:  Ying Christine OF BIRTH:  1980  MEDICAL RECORD NUMBER:  [id number]  DATE:  February 17,2023    Reason for Outpatient Infusion Visit: Belatacept Infusion    If you develop any these symptoms please contact you Doctor    [x] Nausea and/or vomiting not relieved with medication   [x] Swelling, redness, and/or bleeding at injection or IV site    [x] Fever or chills  [x] Rash or itching   [x] Shortness of breath  [x] Please review After Visit Summary (AVS) information on: Belatacept and Kidney transplant    [x] Other: Next March 24, 2023    Discharge Instructions for Belatacept    Please report any of these symptoms to your doctor right away:  Changes in mood or usual behavior  Confusion, problems thinking, loss of memory  Changes in walking or talking  Decreased strength or weakness on one side of body  Changes in vision  Fever, night sweats or tiredness that does not go away  Weight loss, swollen glands  Flu, cold symptoms or cough  Stomach are pain  Vomiting or diarrhea  Tenderness over your transplanted kidney  Change in the amount of urine that you make, blood in urine, pain or burning on urination. A new skin lesion or bump, or change is size or color of a mole    Call the infusion Center, [phone] if you are sick or have any of the above symptoms several days before your next appointment. Outpatient Infusion Center Information: Should you experience any significant changes in your health or have questions about your care please contact the [address] at [address] 8:00 am - 4:00 pm.  If you need help outside these hours and cannot wait until we are again available, contact your Primary Care Physician or go to the hospital emergency room.        Electronically signed by Savita Chapa LUBA Auguste on 2/17/2023 at 2:12 PM

## 2023-02-17 NOTE — PROGRESS NOTES
Outpatient [address] [facility]    Belatacept Visit    NAME:  Shayna Serrano OF BIRTH:  1980  MEDICAL RECORD NUMBER:  [id number]  DATE:  2/17/2023    Patient arrived to [facility] 58   [] per wheelchair   [x] ambulatory      Alert and oriented x 4. Patient had gastric sleeve surgery in October 2022 and has lost 61 lbs since surgery. Patient's current weight is 175 lbs. so dose of Belatacept will need to be adjusted with next visit. Patient reports that she has been feeling very well and denies side effects/adverse effects from previous Belatacept infusions. Diagnosis: Kidney transplant - August 30, 2016. Patient Active Problem List   Diagnosis    Kidney replaced by transplant    Hypercalcemia    High serum parathyroid hormone (PTH)    Hypophosphatemia     Allergies   Allergen Reactions    Dilaudid [Hydromorphone Hcl]     Morphine     Vancomycin Itching    Zithromax [Azithromycin]     Dermabond Rash       Patient needs to be seropositive to EBV prior to Belatacept Infusion. Patient is EBV seropositive? Yes    Has patient had TB skin test or Quantiferon Gold TB test recently? No - does not need to have this done per Dr Meg Antony. Has patient had any vaccines given recently? No    Has patient been instructed to avoid any live vaccine administrations, such as but not limited to : intranasal influenza, measles, mumps, rubella, oral polio, BCG, yellow fever, varicella, and TY21 typhoid vaccines? Reviewed with patient and she verbalized understanding. Avoid prolonged sun light, tanning beds or sun lights. Use sun screen. Immunosuppression can increase risk of skin cancers. - reviewed with patient and she verbalized understanding    Patient is aware that Belatacept is an immunosuppressant therapy and can increase risk of developing  infections and possibly other  Malignancies?   Yes - reviewed with patient and she verbalized understanding     /68   Pulse 72   Temp 98 °F (36.7 °C) (Oral)   Resp 16   Wt 175 lb 11.3 oz (79.7 kg)   SpO2 98%   BMI 31.13 kg/m²     Pre Infusion Checklist       Patient given a copy of the medication guide:  Yes - at previous visit and via AVS today       Patient counseled about increased risk for:    Post-Transplant Lymphoproliferative Disorder (PTLD), predominantly involving the  CNS:  Yes                                                  Increased risk of Progressive Multifocal Leukoencephalopathy (PML), a CNS Infection:  Yes                                                                                                  Over the past month, have you had any new or worsening medical problems such as a new or sudden change in your thinking, memory, speech, mood, behavior, vision, balance, strength, or other problems? No - patient denies these symptoms                        Over the past month, have you had any new or worsening symptoms such as fever, night sweats, tiredness that does not go away, weight loss or swollen glands? No - has had weight loss but it is due to gastric sleeve surgery                   Patient counseled to report any of these symptoms to physician right away. Patient given Belatacept 450 mg IVPB over 30 min IVPB using a .22 micron filter per protocol. Response to treatment:  Well tolerated by patient. Education: Verbal and written discharge instructions reviewed with patient. Verbalized understanding.  Written copy given via AVS     Patient to return for next Belatacept infusion on March 15, 2023      Electronically signed by Lilia Ayala RN on 2/17/2023 at 5:05 PM

## 2023-03-15 ENCOUNTER — HOSPITAL ENCOUNTER (OUTPATIENT)
Dept: INFUSION THERAPY | Age: 43
Setting detail: INFUSION SERIES
Discharge: HOME | End: 2023-03-15
Payer: COMMERCIAL

## 2023-03-15 VITALS
RESPIRATION RATE: 16 BRPM | WEIGHT: 169.31 LBS | BODY MASS INDEX: 29.99 KG/M2 | SYSTOLIC BLOOD PRESSURE: 113 MMHG | DIASTOLIC BLOOD PRESSURE: 53 MMHG | TEMPERATURE: 97.9 F | HEART RATE: 74 BPM

## 2023-03-15 DIAGNOSIS — Z94.0 KIDNEY REPLACED BY TRANSPLANT: Primary | ICD-10-CM

## 2023-03-15 PROCEDURE — 6360000002 HC RX W HCPCS: Performed by: INTERNAL MEDICINE

## 2023-03-15 PROCEDURE — 96365 THER/PROPH/DIAG IV INF INIT: CPT

## 2023-03-15 PROCEDURE — 2580000003 HC RX 258: Performed by: INTERNAL MEDICINE

## 2023-03-15 RX ORDER — SODIUM CHLORIDE 0.9 % (FLUSH) 0.9 %
10 SYRINGE (ML) INJECTION PRN
Status: DISCONTINUED | OUTPATIENT
Start: 2023-03-15 | End: 2023-03-16 | Stop reason: HOSPADM

## 2023-03-15 RX ORDER — SODIUM CHLORIDE 0.9 % (FLUSH) 0.9 %
10 SYRINGE (ML) INJECTION PRN
Start: 2023-04-12

## 2023-03-15 RX ADMIN — DEXTROSE MONOHYDRATE 450 MG: 50 INJECTION, SOLUTION INTRAVENOUS at 10:19

## 2023-03-15 RX ADMIN — SODIUM CHLORIDE, PRESERVATIVE FREE 10 ML: 5 INJECTION INTRAVENOUS at 10:49

## 2023-03-15 RX ADMIN — SODIUM CHLORIDE, PRESERVATIVE FREE 10 ML: 5 INJECTION INTRAVENOUS at 10:18

## 2023-03-15 NOTE — DISCHARGE INSTRUCTIONS
Outpatient Infusion Discharge Instructions  [facility]  [address] [address] [place], [address]  Telephone: [phone]    NAME:  Elayne Glass OF BIRTH:  1980  MEDICAL RECORD NUMBER:  [id number]  DATE:  @ED@    Reason for Outpatient Infusion Visit: ***    If you develop any these symptoms please contact you Doctor    [x] Nausea and/or vomiting not relieved with medication   [x] Swelling, redness, and/or bleeding at injection or IV site    [x] Fever or chills  [x] Rash or itching   [x] Shortness of breath  [] Please review After Visit Summary (AVS) information on    [] Other      Outpatient [address]: Should you experience any significant changes in your health or have questions about your care please contact the [address] at [address] 8:00 am - 4:00 pm.  If you need help outside these hours and cannot wait until we are again available, contact your Primary Care Physician or go to the hospital emergency room.        Electronically signed by Melissa Lynn RN on 3/15/2023 at 10:31 AM

## 2023-03-15 NOTE — PROGRESS NOTES
Outpatient [address] [facility]    Belatacept Visit    NAME:  Nimco Le OF BIRTH:  1980  MEDICAL RECORD NUMBER:  [id number]  DATE:  3/15/2023    Patient arrived to [facility] 58   [] per wheelchair   [x] ambulatory       Diagnosis:  Kidney Transplant    Patient Active Problem List   Diagnosis    Kidney replaced by transplant    Hypercalcemia    High serum parathyroid hormone (PTH)    Hypophosphatemia     Allergies   Allergen Reactions    Dilaudid [Hydromorphone Hcl]     Morphine     Vancomycin Itching    Zithromax [Azithromycin]     Dermabond Rash       Patient needs to be seropositive to EBV prior to Belatacept Infusion. Patient is EBV seropositive? Yes    Has patient had TB skin test or Quantiferon Gold TB test recently? No    Has patient had any vaccines given recently? No    Has patient been instructed to avoid any live vaccine administrations, such as but not limited to : intranasal influenza, measles, mumps, rubella, oral polio, BCG, yellow fever, varicella, and TY21 typhoid vaccines? Yes    Avoid prolonged sun light, tanning beds or sun lights. Use sun screen. Immunosuppression can increase risk of skin cancers. Patient is aware that Belatacept is an immunosuppressant therapy and can increase risk of developing  infections and possibly other  Malignancies?   Yes     BP (!) 113/53   Pulse 74   Temp 97.9 °F (36.6 °C) (Oral)   Resp 16   Wt 169 lb 5 oz (76.8 kg)   BMI 29.99 kg/m²     Pre Infusion Checklist       Patient given a copy of the medication guide:  Yes       Patient counseled about increased risk for:    Post-Transplant Lymphoproliferative Disorder (PTLD), predominantly involving the  CNS:  Yes                                                  Increased risk of Progressive Multifocal Leukoencephalopathy (PML), a CNS Infection:  Yes                                                                                                   Over the past month, have you had any new or worsening medical problems such as a new or sudden change in your thinking, memory, speech, mood, behavior, vision, balance, strength, or other problems? No                         Over the past month, have you had any new or worsening symptoms such as fever, night sweats, tiredness that does not go away, weight loss or swollen glands? No                   Patient counseled to report any of these symptoms to physician right away. Patient given Belatacept 450 mg IVPB over 30 min IVPB using a .22 micron filter per protocol. Response to treatment:  Well tolerated by patient.     Education:    Indicates understanding       Electronically signed by Naresh Chaparro RN on 3/15/2023 at 10:23 AM

## 2023-03-24 ENCOUNTER — APPOINTMENT (OUTPATIENT)
Dept: INFUSION THERAPY | Age: 43
End: 2023-03-24
Payer: COMMERCIAL

## 2023-04-12 ENCOUNTER — HOSPITAL ENCOUNTER (OUTPATIENT)
Dept: INFUSION THERAPY | Age: 43
Setting detail: INFUSION SERIES
Discharge: HOME | End: 2023-04-12

## 2023-04-19 ENCOUNTER — HOSPITAL ENCOUNTER (OUTPATIENT)
Dept: INFUSION THERAPY | Age: 43
Setting detail: INFUSION SERIES
Discharge: HOME | End: 2023-04-19
Payer: MEDICARE

## 2023-04-19 VITALS
WEIGHT: 166.45 LBS | RESPIRATION RATE: 16 BRPM | SYSTOLIC BLOOD PRESSURE: 112 MMHG | TEMPERATURE: 97.8 F | BODY MASS INDEX: 29.48 KG/M2 | DIASTOLIC BLOOD PRESSURE: 79 MMHG | OXYGEN SATURATION: 99 % | HEART RATE: 88 BPM

## 2023-04-19 DIAGNOSIS — Z94.0 KIDNEY REPLACED BY TRANSPLANT: Primary | ICD-10-CM

## 2023-04-19 PROCEDURE — 2580000003 HC RX 258: Performed by: INTERNAL MEDICINE

## 2023-04-19 PROCEDURE — 6360000002 HC RX W HCPCS: Performed by: INTERNAL MEDICINE

## 2023-04-19 PROCEDURE — 96365 THER/PROPH/DIAG IV INF INIT: CPT

## 2023-04-19 RX ORDER — SODIUM CHLORIDE 0.9 % (FLUSH) 0.9 %
10 SYRINGE (ML) INJECTION PRN
Status: DISCONTINUED | OUTPATIENT
Start: 2023-04-19 | End: 2023-04-20 | Stop reason: HOSPADM

## 2023-04-19 RX ORDER — SODIUM CHLORIDE 0.9 % (FLUSH) 0.9 %
10 SYRINGE (ML) INJECTION PRN
Start: 2023-05-10

## 2023-04-19 RX ORDER — VALACYCLOVIR HYDROCHLORIDE 500 MG/1
500 TABLET, FILM COATED ORAL DAILY
COMMUNITY

## 2023-04-19 RX ORDER — NARATRIPTAN 2.5 MG/1
2.5 TABLET ORAL DAILY
COMMUNITY

## 2023-04-19 RX ORDER — BUTALBITAL/ACETAMINOPHEN 50MG-325MG
1 TABLET ORAL 2 TIMES DAILY PRN
COMMUNITY

## 2023-04-19 RX ORDER — AMOXICILLIN 500 MG/1
500 CAPSULE ORAL 2 TIMES DAILY
COMMUNITY

## 2023-04-19 RX ADMIN — DEXTROSE MONOHYDRATE 400 MG: 50 INJECTION, SOLUTION INTRAVENOUS at 10:53

## 2023-04-19 RX ADMIN — SODIUM CHLORIDE, PRESERVATIVE FREE 10 ML: 5 INJECTION INTRAVENOUS at 10:53

## 2023-04-19 RX ADMIN — SODIUM CHLORIDE, PRESERVATIVE FREE 10 ML: 5 INJECTION INTRAVENOUS at 11:27

## 2023-04-19 RX ADMIN — SODIUM CHLORIDE, PRESERVATIVE FREE 10 ML: 5 INJECTION INTRAVENOUS at 11:44

## 2023-04-19 RX ADMIN — SODIUM CHLORIDE, PRESERVATIVE FREE 10 ML: 5 INJECTION INTRAVENOUS at 10:26

## 2023-04-19 NOTE — DISCHARGE INSTRUCTIONS
Outpatient Infusion Discharge Instructions  [place] [address] [facility] [address] [place] [address] [place], [address]  Telephone: [phone]    NAME:  Chau Rojas OF BIRTH:  1980  MEDICAL RECORD NUMBER:  [id number]  DATE:  April 19, 2023    Reason for Outpatient Infusion Visit: Belatacept infusion    If you develop any these symptoms please contact you Doctor    [x] Nausea and/or vomiting not relieved with medication   [x] Swelling, redness, and/or bleeding at injection or IV site    [x] Fever or chills  [x] Rash or itching   [x] Shortness of breath  [x] Please review After Visit Summary (AVS) information on    [x] Other: Next Belatacept infusion - May 24, 2023      Outpatient [facility] Information: Should you experience any significant changes in your health or have questions about your care please contact the [address] at [address] 8:00 am - 4:00 pm.  If you need help outside these hours and cannot wait until we are again available, contact your Primary Care Physician or go to the hospital emergency room.        Electronically signed by Seda Pandey RN on 4/19/2023 at 11:09 AM

## 2023-04-19 NOTE — PROGRESS NOTES
Outpatient [address] [facility]    Belatacept Visit    NAME:  Anitha Vernon OF BIRTH:  1980  MEDICAL RECORD NUMBER:  [id number]  DATE:  4/19/2023    Patient arrived to [facility] 58   [] per wheelchair   [x] ambulatory      Alert and oriented x 4. Patient had gastric sleeve surgery in October 2022 and has lost 72 lbs since surgery. Patient is planning on losing another 8 lbs to reach her goal weight. Patient also has had warts removed from her right pointer finger and left ring finger and has stitches in both areas. Patient has follow up for this on April 26, 2023. Patient has also had a biopsy on her right upper thigh that came back as basal cell carcinoma. Patient is scheduling Mohs surgery for next week. Due to these issues, patient reports that her Belatacept has been changed to every 5 weeks and she has started Valtrex and Amoxicillin. Patient reports that she has been feeling very well and denies side effects/adverse effects from previous Belatacept infusions. Diagnosis: Kidney transplant - August 30, 2016    Patient Active Problem List   Diagnosis    Kidney replaced by transplant    Hypercalcemia    High serum parathyroid hormone (PTH)    Hypophosphatemia     Allergies   Allergen Reactions    Dilaudid [Hydromorphone Hcl]     Morphine     Vancomycin Itching    Zithromax [Azithromycin]     Dermabond Rash       Patient needs to be seropositive to EBV prior to Belatacept Infusion. Patient is EBV seropositive? Yes    Has patient had TB skin test or Quantiferon Gold TB test recently? No - does not need to have per Dr Good Flores    Has patient had any vaccines given recently? No    Has patient been instructed to avoid any live vaccine administrations, such as but not limited to : intranasal influenza, measles, mumps, rubella, oral polio, BCG, yellow fever, varicella, and TY21 typhoid vaccines?   Yes - reviewed with patient and she verbalized

## 2023-05-24 ENCOUNTER — HOSPITAL ENCOUNTER (OUTPATIENT)
Dept: INFUSION THERAPY | Age: 43
Setting detail: INFUSION SERIES
Discharge: HOME | End: 2023-05-24
Payer: COMMERCIAL

## 2023-05-24 VITALS
BODY MASS INDEX: 28.43 KG/M2 | SYSTOLIC BLOOD PRESSURE: 134 MMHG | WEIGHT: 160.5 LBS | HEART RATE: 68 BPM | RESPIRATION RATE: 16 BRPM | DIASTOLIC BLOOD PRESSURE: 83 MMHG

## 2023-05-24 DIAGNOSIS — Z94.0 KIDNEY REPLACED BY TRANSPLANT: Primary | ICD-10-CM

## 2023-05-24 PROCEDURE — 2580000003 HC RX 258: Performed by: INTERNAL MEDICINE

## 2023-05-24 PROCEDURE — 6360000002 HC RX W HCPCS: Performed by: INTERNAL MEDICINE

## 2023-05-24 PROCEDURE — 96365 THER/PROPH/DIAG IV INF INIT: CPT

## 2023-05-24 RX ORDER — MEMANTINE HYDROCHLORIDE 5 MG/1
5 TABLET ORAL DAILY
COMMUNITY

## 2023-05-24 RX ORDER — SODIUM CHLORIDE 0.9 % (FLUSH) 0.9 %
10 SYRINGE (ML) INJECTION PRN
Status: DISCONTINUED | OUTPATIENT
Start: 2023-05-24 | End: 2023-05-25 | Stop reason: HOSPADM

## 2023-05-24 RX ORDER — SODIUM CHLORIDE 0.9 % (FLUSH) 0.9 %
10 SYRINGE (ML) INJECTION PRN
Start: 2023-06-14

## 2023-05-24 RX ADMIN — DEXTROSE MONOHYDRATE 350 MG: 50 INJECTION, SOLUTION INTRAVENOUS at 10:11

## 2023-05-24 RX ADMIN — SODIUM CHLORIDE, PRESERVATIVE FREE 10 ML: 5 INJECTION INTRAVENOUS at 10:11

## 2023-05-24 NOTE — DISCHARGE INSTRUCTIONS
Outpatient Infusion Discharge Instructions  [address] [address] [place] [address]  Telephone: [phone]    NAME:  Scot Quiroz OF BIRTH:  1980  MEDICAL RECORD NUMBER:  [id number]  DATE:  @ED@    Reason for Outpatient Infusion Visit: ***    If you develop any these symptoms please contact you Doctor    [] Nausea and/or vomiting not relieved with medication   [] Swelling, redness, and/or bleeding at injection or IV site    [] Fever or chills  [] Rash or itching   [] Shortness of breath  [] Please review After Visit Summary (AVS) information on    [] Other      Outpatient [address]: Should you experience any significant changes in your health or have questions about your care please contact the [address] at [address] 8:00 am - 4:00 pm.  If you need help outside these hours and cannot wait until we are again available, contact your Primary Care Physician or go to the hospital emergency room.        Electronically signed by Norm Lopez RN on 5/24/2023 at 10:24 AM Outpatient Infusion Discharge Instructions  [facility]  [address] [place] [address]  Telephone: [phone]    NAME:  Vidhya Blake:  1980  MEDICAL RECORD NUMBER:  [id number]  DATE:  [de-identified]    Reason for Outpatient Infusion Visit: ***    If you develop any these symptoms please contact you Doctor    [] Nausea and/or vomiting not relieved with medication   [] Swelling, redness, and/or bleeding at injection or IV site    [] Fever or chills  [] Rash or itching   [] Shortness of breath  [] Please review After Visit Summary (AVS) information on    [] Other      Outpatient [address]: Should you experience any significant changes in your health or have questions about your care please contact the [address] at

## 2023-05-24 NOTE — PROGRESS NOTES
Outpatient [address] [facility]    Belatacept Visit    NAME:  Deandre Reasons OF BIRTH:  1980  MEDICAL RECORD NUMBER:  [id number]  DATE:  5/24/2023    Patient arrived to [facility] 58   [] per wheelchair   [x] ambulatory       Diagnosis: Kidney transplant    Patient Active Problem List   Diagnosis    Kidney replaced by transplant    Hypercalcemia    High serum parathyroid hormone (PTH)    Hypophosphatemia     Allergies   Allergen Reactions    Dilaudid [Hydromorphone Hcl]     Morphine     Vancomycin Itching    Zithromax [Azithromycin]     Dermabond Rash       Patient needs to be seropositive to EBV prior to Belatacept Infusion. Patient is EBV seropositive? Yes    Has patient had TB skin test or Quantiferon Gold TB test recently? No    Has patient had any vaccines given recently? No    Has patient been instructed to avoid any live vaccine administrations, such as but not limited to : intranasal influenza, measles, mumps, rubella, oral polio, BCG, yellow fever, varicella, and TY21 typhoid vaccines? Yes    Avoid prolonged sun light, tanning beds or sun lights. Use sun screen. Immunosuppression can increase risk of skin cancers. Patient is aware that Belatacept is an immunosuppressant therapy and can increase risk of developing  infections and possibly other  Malignancies?   Yes     /83   Pulse 68   Resp 16   Wt 160 lb 7.9 oz (72.8 kg)   BMI 28.43 kg/m²     Pre Infusion Checklist       Patient given a copy of the medication guide:  Yes       Patient counseled about increased risk for:    Post-Transplant Lymphoproliferative Disorder (PTLD), predominantly involving the  CNS:  Yes                                                  Increased risk of Progressive Multifocal Leukoencephalopathy (PML), a CNS Infection:  Yes                                                                                                   Over the past month, have you had any new or

## 2023-06-21 ENCOUNTER — APPOINTMENT (OUTPATIENT)
Dept: INFUSION THERAPY | Age: 43
End: 2023-06-21
Payer: COMMERCIAL

## 2023-06-28 ENCOUNTER — APPOINTMENT (OUTPATIENT)
Dept: INFUSION THERAPY | Age: 43
End: 2023-06-28
Payer: COMMERCIAL

## 2023-06-29 ENCOUNTER — HOSPITAL ENCOUNTER (OUTPATIENT)
Dept: INFUSION THERAPY | Age: 43
Setting detail: INFUSION SERIES
Discharge: HOME | End: 2023-06-29
Payer: COMMERCIAL

## 2023-06-29 VITALS
RESPIRATION RATE: 16 BRPM | WEIGHT: 153 LBS | TEMPERATURE: 98.3 F | DIASTOLIC BLOOD PRESSURE: 84 MMHG | SYSTOLIC BLOOD PRESSURE: 131 MMHG | BODY MASS INDEX: 27.1 KG/M2 | HEART RATE: 76 BPM

## 2023-06-29 DIAGNOSIS — Z94.0 KIDNEY REPLACED BY TRANSPLANT: Primary | ICD-10-CM

## 2023-06-29 PROCEDURE — 96365 THER/PROPH/DIAG IV INF INIT: CPT

## 2023-06-29 PROCEDURE — 2580000003 HC RX 258: Performed by: INTERNAL MEDICINE

## 2023-06-29 PROCEDURE — 6360000002 HC RX W HCPCS: Performed by: INTERNAL MEDICINE

## 2023-06-29 RX ORDER — SODIUM CHLORIDE 0.9 % (FLUSH) 0.9 %
10 SYRINGE (ML) INJECTION PRN
Start: 2023-07-20

## 2023-06-29 RX ORDER — SODIUM CHLORIDE 0.9 % (FLUSH) 0.9 %
10 SYRINGE (ML) INJECTION PRN
Status: DISCONTINUED | OUTPATIENT
Start: 2023-06-29 | End: 2023-06-30 | Stop reason: HOSPADM

## 2023-06-29 RX ADMIN — DEXTROSE MONOHYDRATE 350 MG: 50 INJECTION, SOLUTION INTRAVENOUS at 09:01

## 2023-06-29 RX ADMIN — Medication 10 ML: at 08:45

## 2023-08-02 ENCOUNTER — HOSPITAL ENCOUNTER (OUTPATIENT)
Dept: INFUSION THERAPY | Age: 43
Setting detail: INFUSION SERIES
Discharge: HOME | End: 2023-08-02
Payer: MEDICARE

## 2023-08-02 VITALS
WEIGHT: 156 LBS | HEART RATE: 83 BPM | OXYGEN SATURATION: 98 % | TEMPERATURE: 98.2 F | BODY MASS INDEX: 27.63 KG/M2 | RESPIRATION RATE: 16 BRPM | DIASTOLIC BLOOD PRESSURE: 81 MMHG | SYSTOLIC BLOOD PRESSURE: 111 MMHG

## 2023-08-02 DIAGNOSIS — Z94.0 KIDNEY REPLACED BY TRANSPLANT: Primary | ICD-10-CM

## 2023-08-02 PROCEDURE — 96365 THER/PROPH/DIAG IV INF INIT: CPT

## 2023-08-02 PROCEDURE — 6360000002 HC RX W HCPCS: Performed by: INTERNAL MEDICINE

## 2023-08-02 PROCEDURE — 2580000003 HC RX 258: Performed by: INTERNAL MEDICINE

## 2023-08-02 RX ORDER — SODIUM CHLORIDE 0.9 % (FLUSH) 0.9 %
10 SYRINGE (ML) INJECTION PRN
Start: 2023-08-23

## 2023-08-02 RX ORDER — ATORVASTATIN CALCIUM 20 MG/1
20 TABLET, FILM COATED ORAL NIGHTLY
COMMUNITY

## 2023-08-02 RX ORDER — SODIUM CHLORIDE 0.9 % (FLUSH) 0.9 %
10 SYRINGE (ML) INJECTION PRN
Status: DISCONTINUED | OUTPATIENT
Start: 2023-08-02 | End: 2023-08-03 | Stop reason: HOSPADM

## 2023-08-02 RX ORDER — CEPHALEXIN 500 MG/1
500 CAPSULE ORAL NIGHTLY
COMMUNITY

## 2023-08-02 RX ORDER — CYCLOBENZAPRINE HCL 10 MG
30 TABLET ORAL 3 TIMES DAILY PRN
COMMUNITY

## 2023-08-02 RX ADMIN — SODIUM CHLORIDE, PRESERVATIVE FREE 10 ML: 5 INJECTION INTRAVENOUS at 11:03

## 2023-08-02 RX ADMIN — DEXTROSE MONOHYDRATE 350 MG: 50 INJECTION, SOLUTION INTRAVENOUS at 10:30

## 2023-08-02 RX ADMIN — SODIUM CHLORIDE, PRESERVATIVE FREE 10 ML: 5 INJECTION INTRAVENOUS at 10:26

## 2023-08-02 RX ADMIN — SODIUM CHLORIDE, PRESERVATIVE FREE 20 ML: 5 INJECTION INTRAVENOUS at 11:23

## 2023-08-02 NOTE — DISCHARGE INSTRUCTIONS
Outpatient Infusion Discharge Instructions  [place] [id number], [address] [place] [address] Drive  Telephone: [phone]      FAX [phone]    NAME:  Yoav Slade OF BIRTH:  1980  MEDICAL RECORD NUMBER:  [id number]  DATE:  August 2, 2023    Reason for Outpatient Infusion Visit: Belatacept infusion    If you develop any these symptoms please contact you Doctor    [x] Nausea and/or vomiting not relieved with medication   [x] Swelling, redness, and/or bleeding at injection or IV site    [x] Fever or chills  [x] Rash or itching   [x] Shortness of breath  [x] Please review After Visit Summary (AVS) information on: Belatacept and kidney transplant   [x] Other: Next visit - Sept. 6, 2023    Discharge Instructions for Belatacept    Please report any of these symptoms to your doctor right away:  Changes in mood or usual behavior  Confusion, problems thinking, loss of memory  Changes in walking or talking  Decreased strength or weakness on one side of body  Changes in vision  Fever, night sweats or tiredness that does not go away  Weight loss, swollen glands  Flu, cold symptoms or cough  Stomach are pain  Vomiting or diarrhea  Tenderness over your transplanted kidney  Change in the amount of urine that you make, blood in urine, pain or burning on urination. A new skin lesion or bump, or change is size or color of a mole    Call the infusion Center, [phone] if you are sick or have any of the above symptoms several days before your next appointment. Outpatient Infusion Center Information: Should you experience any significant changes in your health or have questions about your care please contact the [address] [place] [address] at [address] [place] [address] 8:00 am - 4:00 pm.  If you need help outside these hours and cannot wait until we are again available, contact your Primary Care Physician or go to the hospital emergency room.        Electronically signed by Ntaalia Castle

## 2023-08-02 NOTE — PROGRESS NOTES
Outpatient [facility]    Belatacept Visit    NAME:  Elizabeth Mota OF BIRTH:  1980  MEDICAL RECORD NUMBER:  [id number]  DATE:  8/2/2023    Patient arrived to [address] [place] [address]   [] per wheelchair   [x] ambulatory       Alert and oriented x 4. Patient reports that she has been feeling well since her last visit but that she is having recurrent UTI's so she has been started on Keflex maintenance dose. Patient also reports that she is going to have allergy testing done today so she has stopped her Xyzal. She has also stopped her Valtrex to have \"Cellcept testing\" done next Monday August 7, 2023. Denies side effects/adverse effects from previous Belatacept infusions. Diagnosis: August 30, 2016    Patient Active Problem List   Diagnosis    Kidney replaced by transplant    Hypercalcemia    High serum parathyroid hormone (PTH)    Hypophosphatemia     Allergies   Allergen Reactions    Dilaudid [Hydromorphone Hcl]     Morphine     Vancomycin Itching    Zithromax [Azithromycin]     Dermabond Rash       Patient needs to be seropositive to EBV prior to Belatacept Infusion. Patient is EBV seropositive? Yes    Has patient had TB skin test or Quantiferon Gold TB test recently? No - does not need to have done per Dr Radha Rico    Has patient had any vaccines given recently? No    Has patient been instructed to avoid any live vaccine administrations, such as but not limited to : intranasal influenza, measles, mumps, rubella, oral polio, BCG, yellow fever, varicella, and TY21 typhoid vaccines? Yes - reviewed with patient and she verbalized understanding    Avoid prolonged sun light, tanning beds or sun lights. Use sun screen. Immunosuppression can increase risk of skin cancers.  - Reviewed with patient and she verbalized understanding    Patient is aware that Belatacept is an immunosuppressant therapy and can increase risk of developing  infections and possibly other

## 2023-09-06 ENCOUNTER — HOSPITAL ENCOUNTER (OUTPATIENT)
Dept: INFUSION THERAPY | Age: 43
Setting detail: INFUSION SERIES
Discharge: HOME | End: 2023-09-06
Payer: MEDICARE

## 2023-09-06 VITALS
BODY MASS INDEX: 28.36 KG/M2 | WEIGHT: 160.05 LBS | RESPIRATION RATE: 16 BRPM | TEMPERATURE: 98 F | OXYGEN SATURATION: 98 % | SYSTOLIC BLOOD PRESSURE: 125 MMHG | HEIGHT: 63 IN | HEART RATE: 87 BPM | DIASTOLIC BLOOD PRESSURE: 85 MMHG

## 2023-09-06 DIAGNOSIS — Z94.0 KIDNEY REPLACED BY TRANSPLANT: Primary | ICD-10-CM

## 2023-09-06 PROCEDURE — 2580000003 HC RX 258: Performed by: INTERNAL MEDICINE

## 2023-09-06 PROCEDURE — 96365 THER/PROPH/DIAG IV INF INIT: CPT

## 2023-09-06 PROCEDURE — 6360000002 HC RX W HCPCS: Performed by: INTERNAL MEDICINE

## 2023-09-06 RX ORDER — SODIUM CHLORIDE 0.9 % (FLUSH) 0.9 %
10 SYRINGE (ML) INJECTION PRN
Start: 2023-09-27

## 2023-09-06 RX ORDER — SODIUM CHLORIDE 0.9 % (FLUSH) 0.9 %
10 SYRINGE (ML) INJECTION PRN
Status: DISCONTINUED | OUTPATIENT
Start: 2023-09-06 | End: 2023-09-07 | Stop reason: HOSPADM

## 2023-09-06 RX ADMIN — SODIUM CHLORIDE, PRESERVATIVE FREE 10 ML: 5 INJECTION INTRAVENOUS at 11:12

## 2023-09-06 RX ADMIN — DEXTROSE MONOHYDRATE 350 MG: 50 INJECTION, SOLUTION INTRAVENOUS at 10:37

## 2023-09-06 RX ADMIN — SODIUM CHLORIDE, PRESERVATIVE FREE 10 ML: 5 INJECTION INTRAVENOUS at 10:30

## 2023-09-06 RX ADMIN — SODIUM CHLORIDE, PRESERVATIVE FREE 10 ML: 5 INJECTION INTRAVENOUS at 11:26

## 2023-09-06 NOTE — PROGRESS NOTES
Outpatient [facility]    Belatacept Visit    NAME:  Urimla Jeffers OF BIRTH:  1980  MEDICAL RECORD NUMBER:  [id number]  DATE:  9/6/2023    Patient arrived to [address] [place] [address]   [] per wheelchair   [x] ambulatory       Alert and oriented x 4. Patient reports that she has been feeling well since her last visit but that she continues having recurrent UTI symptoms. Patient reports that currently she is having cloudy, foul smelling urine and some pelvic cramping. Reports that she is not taking the Keflex maintenance dose because Dr Jesica Ernst does not want her to take it unless she has severe symptoms such as frequency, burning or blood in her urine. Patient started TheraCran daily instead of Keflex at the recommendation of Dr Jesica Ernst. Patient reported her current symptoms to Dr Jesica Ernst this AM to see if she would need a UA/C&S. Patient states that Dr Jesica Ernst does not want one today but that if her symptoms worsen, she will call Dr Jia Garibay office. Denies side effects/adverse effects from previous Belatacept infusions. Diagnosis: Kidney transplant on August 30, 2016    Patient Active Problem List   Diagnosis    Kidney replaced by transplant    Hypercalcemia    High serum parathyroid hormone (PTH)    Hypophosphatemia     Allergies   Allergen Reactions    Dilaudid [Hydromorphone Hcl]     Morphine     Vancomycin Itching    Zithromax [Azithromycin]     Dermabond Rash       Patient needs to be seropositive to EBV prior to Belatacept Infusion. Patient is EBV seropositive? Yes    Has patient had TB skin test or Quantiferon Gold TB test recently? No - does not need to have this done per Dr Jesica Ernst    Has patient had any vaccines given recently? No    Has patient been instructed to avoid any live vaccine administrations, such as but not limited to : intranasal influenza, measles, mumps, rubella, oral polio, BCG, yellow fever, varicella, and TY21 typhoid vaccines?   Yes -

## 2023-09-06 NOTE — DISCHARGE INSTRUCTIONS
Outpatient Infusion Discharge Instructions  [place] [id number], [address] [place] [address] Drive  Telephone: [phone]      FAX [phone]    NAME:  Norma Ramirez OF BIRTH:  1980  MEDICAL RECORD NUMBER:  [id number]  DATE:  September 6, 2023    Reason for Outpatient Infusion Visit: Belatacept Infusion    If you develop any these symptoms please contact you Doctor    [x] Nausea and/or vomiting not relieved with medication   [x] Swelling, redness, and/or bleeding at injection or IV site    [x] Fever or chills  [x] Rash or itching   [x] Shortness of breath  [x] Please review After Visit Summary (AVS) information on: Belatacept and kidney transplant    [x] Other: Next Belatacept infusion: October 12, 2023    Discharge Instructions for Belatacept    Please report any of these symptoms to your doctor right away:  Changes in mood or usual behavior  Confusion, problems thinking, loss of memory  Changes in walking or talking  Decreased strength or weakness on one side of body  Changes in vision  Fever, night sweats or tiredness that does not go away  Weight loss, swollen glands  Flu, cold symptoms or cough  Stomach are pain  Vomiting or diarrhea  Tenderness over your transplanted kidney  Change in the amount of urine that you make, blood in urine, pain or burning on urination. A new skin lesion or bump, or change is size or color of a mole    Call the infusion Center, [phone] if you are sick or have any of the above symptoms several days before your next appointment. Outpatient Infusion Center Information: Should you experience any significant changes in your health or have questions about your care please contact the [address] [place] [address] St at [address] [place] [address] 8:00 am - 4:00 pm.  If you need help outside these hours and cannot wait until we are again available, contact your Primary Care Physician or go to the hospital emergency room.

## 2023-10-12 ENCOUNTER — HOSPITAL ENCOUNTER (OUTPATIENT)
Dept: INFUSION THERAPY | Age: 43
Setting detail: INFUSION SERIES
Discharge: HOME | End: 2023-10-12
Payer: MEDICARE

## 2023-10-12 VITALS
DIASTOLIC BLOOD PRESSURE: 78 MMHG | RESPIRATION RATE: 16 BRPM | WEIGHT: 159.17 LBS | TEMPERATURE: 97.8 F | HEART RATE: 81 BPM | SYSTOLIC BLOOD PRESSURE: 126 MMHG | BODY MASS INDEX: 28.2 KG/M2 | HEIGHT: 63 IN | OXYGEN SATURATION: 97 %

## 2023-10-12 DIAGNOSIS — Z94.0 KIDNEY REPLACED BY TRANSPLANT: Primary | ICD-10-CM

## 2023-10-12 PROCEDURE — 96365 THER/PROPH/DIAG IV INF INIT: CPT

## 2023-10-12 PROCEDURE — 6360000002 HC RX W HCPCS: Performed by: INTERNAL MEDICINE

## 2023-10-12 PROCEDURE — 2580000003 HC RX 258: Performed by: INTERNAL MEDICINE

## 2023-10-12 RX ORDER — SODIUM CHLORIDE 0.9 % (FLUSH) 0.9 %
10 SYRINGE (ML) INJECTION PRN
Status: DISCONTINUED | OUTPATIENT
Start: 2023-10-12 | End: 2023-10-13 | Stop reason: HOSPADM

## 2023-10-12 RX ORDER — SODIUM CHLORIDE 0.9 % (FLUSH) 0.9 %
10 SYRINGE (ML) INJECTION PRN
Start: 2023-11-02

## 2023-10-12 RX ADMIN — SODIUM CHLORIDE, PRESERVATIVE FREE 20 ML: 5 INJECTION INTRAVENOUS at 11:20

## 2023-10-12 RX ADMIN — SODIUM CHLORIDE, PRESERVATIVE FREE 10 ML: 5 INJECTION INTRAVENOUS at 11:03

## 2023-10-12 RX ADMIN — SODIUM CHLORIDE, PRESERVATIVE FREE 10 ML: 5 INJECTION INTRAVENOUS at 10:13

## 2023-10-12 RX ADMIN — DEXTROSE MONOHYDRATE 350 MG: 50 INJECTION, SOLUTION INTRAVENOUS at 10:31

## 2023-10-12 RX ADMIN — SODIUM CHLORIDE, PRESERVATIVE FREE 10 ML: 5 INJECTION INTRAVENOUS at 10:30

## 2023-10-12 NOTE — DISCHARGE INSTRUCTIONS
Outpatient Infusion Discharge Instructions  [place] [id number], [address] [place] [address] Drive  Telephone: [phone]    NAME:  Ang Quintanilla OF BIRTH:  1980  MEDICAL RECORD NUMBER:  [id number]  DATE:  October 12, 2023    Reason for Outpatient Infusion Visit: Belatacept Infusion    If you develop any these symptoms please contact you Doctor    [x] Nausea and/or vomiting not relieved with medication   [x] Swelling, redness, and/or bleeding at injection or IV site    [x] Fever or chills  [x] Rash or itching   [x] Shortness of breath  [x] Please review After Visit Summary (AVS) information on: Belatacept and kidney transplant    [x] Other: Next appt - November 16, 2023    Discharge Instructions for Belatacept    Please report any of these symptoms to your doctor right away:  Changes in mood or usual behavior  Confusion, problems thinking, loss of memory  Changes in walking or talking  Decreased strength or weakness on one side of body  Changes in vision  Fever, night sweats or tiredness that does not go away  Weight loss, swollen glands  Flu, cold symptoms or cough  Stomach are pain  Vomiting or diarrhea  Tenderness over your transplanted kidney  Change in the amount of urine that you make, blood in urine, pain or burning on urination. A new skin lesion or bump, or change is size or color of a mole    Call the infusion Center, [phone] if you are sick or have any of the above symptoms several days before your next appointment. Outpatient Infusion Center Information: Should you experience any significant changes in your health or have questions about your care please contact the [address] [place] [address] St at [address] 8:00 am - 4:00 pm.  If you need help outside these hours and cannot wait until we are again available, contact your Primary Care Physician or go to the hospital emergency room.        Electronically signed by Mia Councilman, RN on 10/12/2023 at 11:04 AM

## 2023-11-16 ENCOUNTER — HOSPITAL ENCOUNTER (OUTPATIENT)
Dept: INFUSION THERAPY | Age: 43
Setting detail: INFUSION SERIES
Discharge: HOME | End: 2023-11-16

## 2023-11-20 ENCOUNTER — HOSPITAL ENCOUNTER (OUTPATIENT)
Dept: INFUSION THERAPY | Age: 43
Setting detail: INFUSION SERIES
Discharge: HOME | End: 2023-11-20
Payer: MEDICARE

## 2023-11-20 VITALS
TEMPERATURE: 97.4 F | WEIGHT: 158.73 LBS | HEIGHT: 63 IN | HEART RATE: 91 BPM | DIASTOLIC BLOOD PRESSURE: 82 MMHG | BODY MASS INDEX: 28.12 KG/M2 | SYSTOLIC BLOOD PRESSURE: 131 MMHG | RESPIRATION RATE: 16 BRPM

## 2023-11-20 DIAGNOSIS — Z94.0 KIDNEY REPLACED BY TRANSPLANT: Primary | ICD-10-CM

## 2023-11-20 PROCEDURE — 6360000002 HC RX W HCPCS: Performed by: INTERNAL MEDICINE

## 2023-11-20 PROCEDURE — 96365 THER/PROPH/DIAG IV INF INIT: CPT

## 2023-11-20 PROCEDURE — 2580000003 HC RX 258: Performed by: INTERNAL MEDICINE

## 2023-11-20 RX ORDER — SODIUM CHLORIDE 0.9 % (FLUSH) 0.9 %
10 SYRINGE (ML) INJECTION PRN
Start: 2023-12-04

## 2023-11-20 RX ORDER — SODIUM CHLORIDE 0.9 % (FLUSH) 0.9 %
10 SYRINGE (ML) INJECTION PRN
Status: DISCONTINUED | OUTPATIENT
Start: 2023-11-20 | End: 2023-11-21 | Stop reason: HOSPADM

## 2023-11-20 RX ADMIN — Medication 20 ML: at 10:45

## 2023-11-20 RX ADMIN — DEXTROSE MONOHYDRATE 350 MG: 50 INJECTION, SOLUTION INTRAVENOUS at 10:11

## 2023-11-20 RX ADMIN — Medication 10 ML: at 10:00

## 2023-11-20 NOTE — DISCHARGE INSTRUCTIONS
Outpatient Infusion Discharge Instructions  [place] [id number], [address] [place] [address] Drive  Telephone: [phone]    NAME:  Arnaldo Yuen OF BIRTH:  1980  MEDICAL RECORD NUMBER:  [id number]  DATE:  11/20/23    Reason for Outpatient Infusion Visit: Belatacept    If you develop any these symptoms please contact you Doctor    [] Nausea and/or vomiting not relieved with medication   [x] Swelling, redness, and/or bleeding at injection or IV site    [] Fever or chills  [] Rash or itching   [] Shortness of breath  [x] Please review After Visit Summary (AVS) information on  Belatacept  [] Other      Outpatient [facility]: Should you experience any significant changes in your health or have questions about your care please contact the [address] [place] [address] at [address] [place] [address] 8:00 am - 4:00 pm.  If you need help outside these hours and cannot wait until we are again available, contact your Primary Care Physician or go to the hospital emergency room.        Electronically signed by Brooks Vieyra RN on 11/20/2023 at 10:18 AM

## 2023-12-27 ENCOUNTER — APPOINTMENT (OUTPATIENT)
Dept: INFUSION THERAPY | Age: 43
End: 2023-12-27
Payer: MEDICARE

## 2023-12-29 ENCOUNTER — HOSPITAL ENCOUNTER (OUTPATIENT)
Dept: INFUSION THERAPY | Age: 43
Setting detail: INFUSION SERIES
Discharge: HOME | End: 2023-12-29
Payer: MEDICARE

## 2023-12-29 VITALS
HEART RATE: 93 BPM | WEIGHT: 159 LBS | RESPIRATION RATE: 16 BRPM | DIASTOLIC BLOOD PRESSURE: 91 MMHG | TEMPERATURE: 98.1 F | SYSTOLIC BLOOD PRESSURE: 131 MMHG | BODY MASS INDEX: 28.17 KG/M2

## 2023-12-29 DIAGNOSIS — Z94.0 KIDNEY REPLACED BY TRANSPLANT: Primary | ICD-10-CM

## 2023-12-29 PROCEDURE — 6360000002 HC RX W HCPCS: Performed by: INTERNAL MEDICINE

## 2023-12-29 PROCEDURE — 2580000003 HC RX 258: Performed by: INTERNAL MEDICINE

## 2023-12-29 PROCEDURE — 96365 THER/PROPH/DIAG IV INF INIT: CPT

## 2023-12-29 RX ORDER — SODIUM CHLORIDE 0.9 % (FLUSH) 0.9 %
10 SYRINGE (ML) INJECTION PRN
Start: 2024-01-19

## 2023-12-29 RX ORDER — SODIUM CHLORIDE 0.9 % (FLUSH) 0.9 %
10 SYRINGE (ML) INJECTION PRN
Status: DISCONTINUED | OUTPATIENT
Start: 2023-12-29 | End: 2023-12-30 | Stop reason: HOSPADM

## 2023-12-29 RX ADMIN — Medication 10 ML: at 12:30

## 2023-12-29 RX ADMIN — DEXTROSE MONOHYDRATE 350 MG: 50 INJECTION, SOLUTION INTRAVENOUS at 11:54

## 2023-12-29 RX ADMIN — Medication 10 ML: at 11:54

## 2024-01-31 ENCOUNTER — HOSPITAL ENCOUNTER (OUTPATIENT)
Dept: INFUSION THERAPY | Age: 44
Setting detail: INFUSION SERIES
Discharge: HOME | End: 2024-01-31
Payer: MEDICARE

## 2024-01-31 VITALS
BODY MASS INDEX: 28.78 KG/M2 | SYSTOLIC BLOOD PRESSURE: 131 MMHG | OXYGEN SATURATION: 100 % | DIASTOLIC BLOOD PRESSURE: 91 MMHG | WEIGHT: 162.48 LBS | HEART RATE: 97 BPM | RESPIRATION RATE: 18 BRPM

## 2024-01-31 DIAGNOSIS — Z94.0 KIDNEY REPLACED BY TRANSPLANT: Primary | ICD-10-CM

## 2024-01-31 PROCEDURE — 6360000002 HC RX W HCPCS: Performed by: INTERNAL MEDICINE

## 2024-01-31 PROCEDURE — 2580000003 HC RX 258: Performed by: INTERNAL MEDICINE

## 2024-01-31 PROCEDURE — 96365 THER/PROPH/DIAG IV INF INIT: CPT

## 2024-01-31 RX ORDER — SODIUM CHLORIDE 0.9 % (FLUSH) 0.9 %
10 SYRINGE (ML) INJECTION PRN
Start: 2024-02-21

## 2024-01-31 RX ORDER — SODIUM CHLORIDE 0.9 % (FLUSH) 0.9 %
10 SYRINGE (ML) INJECTION PRN
Status: DISCONTINUED | OUTPATIENT
Start: 2024-01-31 | End: 2024-02-01 | Stop reason: HOSPADM

## 2024-01-31 RX ADMIN — DEXTROSE MONOHYDRATE 350 MG: 50 INJECTION, SOLUTION INTRAVENOUS at 10:41

## 2024-01-31 NOTE — PROGRESS NOTES
Outpatient Infusion Center  [facility] [place] [facility]    Belatacept Visit    NAME:  Fatuma Joy  YOB: 1980  MEDICAL RECORD NUMBER:  [id number]  DATE:  1/31/2024    Patient arrived to Outpatient Infusion Center   [] per wheelchair   [x] ambulatory       Diagnosis: Kidney transplant 2016    Patient had bariatric surgery 10/6/22 and is losing a lot of weight. Dosage has been adjusted.    Patient Active Problem List   Diagnosis    Kidney replaced by transplant    Hypercalcemia    High serum parathyroid hormone (PTH)    Hypophosphatemia     Allergies   Allergen Reactions    Dilaudid [Hydromorphone Hcl]     Morphine     Vancomycin Itching    Zithromax [Azithromycin]     Dermabond Rash       Patient needs to be seropositive to EBV prior to Belatacept Infusion. Patient is EBV seropositive?  Yes    Has patient had TB skin test or Quantiferon Gold TB test recently?  No    Has patient had any vaccines given recently?  No    Has patient been instructed to avoid any live vaccine administrations, such as but not limited to : intranasal influenza, measles, mumps, rubella, oral polio, BCG, yellow fever, varicella, and TY21 typhoid vaccines?  Yes    Avoid prolonged sun light, tanning beds or sun lights. Use sun screen. Immunosuppression can increase risk of skin cancers.    Patient is aware that Belatacept is an immunosuppressant therapy and can increase risk of developing  infections and possibly other  Malignancies?  Yes     BP (!) 131/91   Pulse 97   Resp 18   Wt 73.7 kg (162 lb 7.7 oz)   SpO2 100%   BMI 28.78 kg/m²     Pre Infusion Checklist       Patient given a copy of the medication guide:  Yes       Patient counseled about increased risk for:    Post-Transplant Lymphoproliferative Disorder (PTLD), predominantly involving the  CNS:  Yes                                                  Increased risk of Progressive Multifocal Leukoencephalopathy (PML), a CNS Infection:  Yes

## 2024-01-31 NOTE — DISCHARGE INSTRUCTIONS
Discharge Instructions for Belatacept    Please report any of these symptoms to your doctor right away:  Changes in mood or usual behavior  Confusion, problems thinking, loss of memory  Changes in walking or talking  Decreased strength or weakness on one side of body  Changes in vision  Fever, night sweats or tiredness that does not go away  Weight loss, swollen glands  Flu, cold symptoms or cough  Stomach are pain  Vomiting or diarrhea  Tenderness over your transplanted kidney  Change in the amount of urine that you make, blood in urine, pain or burning on urination.  A new skin lesion or bump, or change is size or color of a mole    Call the infusion Center, [phone] if you are sick or have any of the above symptoms several days before your next appointment.

## 2024-03-06 ENCOUNTER — HOSPITAL ENCOUNTER (OUTPATIENT)
Dept: INFUSION THERAPY | Age: 44
Setting detail: INFUSION SERIES
Discharge: HOME | End: 2024-03-06
Payer: MEDICARE

## 2024-03-06 VITALS
DIASTOLIC BLOOD PRESSURE: 79 MMHG | RESPIRATION RATE: 18 BRPM | BODY MASS INDEX: 28.17 KG/M2 | TEMPERATURE: 98.3 F | SYSTOLIC BLOOD PRESSURE: 113 MMHG | WEIGHT: 159 LBS | HEART RATE: 78 BPM | OXYGEN SATURATION: 98 %

## 2024-03-06 DIAGNOSIS — Z94.0 KIDNEY REPLACED BY TRANSPLANT: Primary | ICD-10-CM

## 2024-03-06 PROCEDURE — 2580000003 HC RX 258: Performed by: INTERNAL MEDICINE

## 2024-03-06 PROCEDURE — 6360000002 HC RX W HCPCS: Performed by: INTERNAL MEDICINE

## 2024-03-06 PROCEDURE — 96365 THER/PROPH/DIAG IV INF INIT: CPT

## 2024-03-06 RX ORDER — SODIUM CHLORIDE 0.9 % (FLUSH) 0.9 %
10 SYRINGE (ML) INJECTION PRN
Status: DISCONTINUED | OUTPATIENT
Start: 2024-03-06 | End: 2024-03-07 | Stop reason: HOSPADM

## 2024-03-06 RX ORDER — METHOCARBAMOL 500 MG/1
500 TABLET, FILM COATED ORAL 4 TIMES DAILY PRN
COMMUNITY

## 2024-03-06 RX ORDER — SODIUM CHLORIDE 0.9 % (FLUSH) 0.9 %
10 SYRINGE (ML) INJECTION PRN
Start: 2024-03-27

## 2024-03-06 RX ADMIN — SODIUM CHLORIDE, PRESERVATIVE FREE 10 ML: 5 INJECTION INTRAVENOUS at 11:08

## 2024-03-06 RX ADMIN — SODIUM CHLORIDE, PRESERVATIVE FREE 10 ML: 5 INJECTION INTRAVENOUS at 11:23

## 2024-03-06 RX ADMIN — DEXTROSE MONOHYDRATE 350 MG: 50 INJECTION, SOLUTION INTRAVENOUS at 10:35

## 2024-03-06 RX ADMIN — SODIUM CHLORIDE, PRESERVATIVE FREE 10 ML: 5 INJECTION INTRAVENOUS at 10:31

## 2024-03-06 NOTE — DISCHARGE INSTRUCTIONS
Outpatient Infusion Discharge Instructions  [facility]  [address] [facility] [address] [place], [place] [address]  Telephone: [phone]      FAX [phone]    NAME:  Fatuma Joy  YOB: 1980  MEDICAL RECORD NUMBER:  [id number]  DATE:  March 6, 2024    Reason for Outpatient Infusion Visit: Belatacept infusion    If you develop any these symptoms please contact you Doctor    [] Nausea and/or vomiting not relieved with medication   [x] Swelling, redness, and/or bleeding at injection or IV site    [x] Fever or chills  [x] Rash or itching   [x] Shortness of breath  [x] Please review After Visit Summary (AVS) information on: Belatacept and kidney transplant   [x] Other: Next Belatacept infusion - April 10, 2024      Outpatient Infusion Center Information: Should you experience any significant changes in your health or have questions about your care please contact the [facility] at [phone] MONDAY - FRIDAY 8:00 am - 4:00 pm.  If you need help outside these hours and cannot wait until we are again available, contact your Primary Care Physician or go to the hospital emergency room.       Electronically signed by Miguelina Auguste RN on 3/6/2024 at 11:14 AM   signed by Miguelina Auguste RN on 3/6/2024 at 11:14 AM

## 2024-03-06 NOTE — PROGRESS NOTES
Outpatient Infusion Center  [facility]    Belatacept Visit    NAME:  Fatuma Joy  YOB: 1980  MEDICAL RECORD NUMBER:  [id number]  DATE:  3/6/2024    Patient arrived to Outpatient Infusion Center   [] per wheelchair   [x] ambulatory      Alert and oriented x 4. Patient reports that she has been feeling well lately other than having more back pain. Patient reports that she has been told the pain is due to her recent weight loss and excess abdominal skin. Patient states that she is currently doing physical therapy and is going to start acupuncture in July when an opening is available.     Diagnosis: Kidney transplant on August 30, 2016    Patient Active Problem List   Diagnosis    Kidney replaced by transplant    Hypercalcemia    High serum parathyroid hormone (PTH)    Hypophosphatemia     Allergies   Allergen Reactions    Dilaudid [Hydromorphone Hcl]     Morphine     Vancomycin Itching    Zithromax [Azithromycin]     Dermabond Rash       Patient needs to be seropositive to EBV prior to Belatacept Infusion. Patient is EBV seropositive?  Yes    Has patient had TB skin test or Quantiferon Gold TB test recently?   No - does not need to have this done per Dr Lord     Has patient had any vaccines given recently?  No    Has patient been instructed to avoid any live vaccine administrations, such as but not limited to : intranasal influenza, measles, mumps, rubella, oral polio, BCG, yellow fever, varicella, and TY21 typhoid vaccines?  Yes -reviewed with patient and she verbalized     Avoid prolonged sun light, tanning beds or sun lights. Use sun screen. Immunosuppression can increase risk of skin cancers. - Reviewed with patient to use sunscreen while she is on her trip in [place] next week    Patient is aware that Belatacept is an immunosuppressant therapy and can increase risk of developing  infections and possibly other  Malignancies?  Yes     /79   Pulse 78   Temp 98.3 °F (36.8

## 2024-04-10 ENCOUNTER — HOSPITAL ENCOUNTER (OUTPATIENT)
Dept: INFUSION THERAPY | Age: 44
Setting detail: INFUSION SERIES
Discharge: HOME | End: 2024-04-10
Payer: MEDICARE

## 2024-04-10 VITALS
TEMPERATURE: 97.9 F | BODY MASS INDEX: 28.27 KG/M2 | SYSTOLIC BLOOD PRESSURE: 120 MMHG | HEART RATE: 78 BPM | DIASTOLIC BLOOD PRESSURE: 85 MMHG | WEIGHT: 159.61 LBS | OXYGEN SATURATION: 98 % | RESPIRATION RATE: 16 BRPM

## 2024-04-10 DIAGNOSIS — Z94.0 KIDNEY REPLACED BY TRANSPLANT: Primary | ICD-10-CM

## 2024-04-10 PROCEDURE — 6360000002 HC RX W HCPCS: Performed by: INTERNAL MEDICINE

## 2024-04-10 PROCEDURE — 96365 THER/PROPH/DIAG IV INF INIT: CPT

## 2024-04-10 PROCEDURE — 2580000003 HC RX 258: Performed by: INTERNAL MEDICINE

## 2024-04-10 RX ORDER — SODIUM CHLORIDE 0.9 % (FLUSH) 0.9 %
10 SYRINGE (ML) INJECTION PRN
Start: 2024-05-01

## 2024-04-10 RX ORDER — SODIUM CHLORIDE 0.9 % (FLUSH) 0.9 %
10 SYRINGE (ML) INJECTION PRN
Status: DISCONTINUED | OUTPATIENT
Start: 2024-04-10 | End: 2024-04-11 | Stop reason: HOSPADM

## 2024-04-10 RX ADMIN — SODIUM CHLORIDE, PRESERVATIVE FREE 5 ML: 5 INJECTION INTRAVENOUS at 10:21

## 2024-04-10 RX ADMIN — SODIUM CHLORIDE, PRESERVATIVE FREE 10 ML: 5 INJECTION INTRAVENOUS at 10:16

## 2024-04-10 RX ADMIN — DEXTROSE MONOHYDRATE 350 MG: 50 INJECTION, SOLUTION INTRAVENOUS at 10:21

## 2024-04-10 RX ADMIN — SODIUM CHLORIDE, PRESERVATIVE FREE 10 ML: 5 INJECTION INTRAVENOUS at 11:12

## 2024-04-10 RX ADMIN — SODIUM CHLORIDE, PRESERVATIVE FREE 10 ML: 5 INJECTION INTRAVENOUS at 10:54

## 2024-04-10 NOTE — PROGRESS NOTES
Outpatient Infusion Center  [facility]    Belatacept Visit    NAME:  Fatuma Joy  YOB: 1980  MEDICAL RECORD NUMBER:  [id number]  DATE:  4/10/2024    Patient arrived to Outpatient Infusion Center   [] per wheelchair   [x] ambulatory     Alert and oriented x 4.  Patient reports that she has been feeling well since her last visit other than having some back pain. Patient reports that she had a steroid injection about 1 week ago and that this has helped a lot with her back.   Patient denies side effects from previous Belatacept infusions and denies signs/symptoms of kidney rejection.    Diagnosis: Kidney transplant on August 30, 2016     Patient Active Problem List   Diagnosis    Kidney replaced by transplant    Hypercalcemia    High serum parathyroid hormone (PTH)    Hypophosphatemia     Allergies   Allergen Reactions    Dilaudid [Hydromorphone Hcl]     Morphine     Vancomycin Itching    Zithromax [Azithromycin]     Dermabond Rash       Patient needs to be seropositive to EBV prior to Belatacept Infusion. Patient is EBV seropositive?  Yes    Has patient had TB skin test or Quantiferon Gold TB test recently?  No - does not need to have this done per Dr Lord     Has patient had any vaccines given recently?  No    Has patient been instructed to avoid any live vaccine administrations, such as but not limited to : intranasal influenza, measles, mumps, rubella, oral polio, BCG, yellow fever, varicella, and TY21 typhoid vaccines?  Yes - reviewed and patient verbalized understanding    Avoid prolonged sun light, tanning beds or sun lights. Use sun screen. Immunosuppression can increase risk of skin cancers. - Reviewed and stressed importance of wearing sunscreen while she is on her cruise this upcoming week.    Patient is aware that Belatacept is an immunosuppressant therapy and can increase risk of developing  infections and possibly other  Malignancies?  Yes     /85   Pulse 78    Temp 97.9 °F (36.6 °C) (Oral)   Resp 16   Wt 72.4 kg (159 lb 9.8 oz)   SpO2 98%   BMI 28.27 kg/m²     Pre Infusion Checklist       Patient given a copy of the medication guide:  Yes - given at previous visit and AVS today       Patient counseled about increased risk for:    Post-Transplant Lymphoproliferative Disorder (PTLD), predominantly involving the  CNS:  Yes                                                  Increased risk of Progressive Multifocal Leukoencephalopathy (PML), a CNS Infection:  Yes                                                                                                   Over the past month, have you had any new or worsening medical problems such as a new or sudden change in your thinking, memory, speech, mood, behavior, vision, balance, strength, or other problems?  No - patient denies these symptoms                        Over the past month, have you had any new or worsening symptoms such as fever, night sweats, tiredness that does not go away, weight loss or swollen glands?  No - patient denies these symptoms                   Patient counseled to report any of these symptoms to physician right away.    Patient given Belatacept 350 mg IVPB over 30 min IVPB using a .22 micron filter per protocol.    Response to treatment:  Well tolerated by patient.    Education: Verbal and written discharge instructions reviewed with patient. Verbalized understanding. Written copy given via AVS      Follow up: Patient to return for next Belatacept infusion on May 15, 2024     Electronically signed by Miguelina Auguste RN on 4/10/2024 at 9:24 PM

## 2024-04-10 NOTE — DISCHARGE INSTRUCTIONS
Outpatient Infusion Discharge Instructions  [facility]  [address] [facility] [address] [place], [place] [address]  Telephone: [phone]      FAX [phone]    NAME:  Fatuma Joy  YOB: 1980  MEDICAL RECORD NUMBER:  [id number]  DATE:  April 10, 2024    Reason for Outpatient Infusion Visit: Belatacept Infusion    If you develop any these symptoms please contact you Doctor    [] Nausea and/or vomiting not relieved with medication   [x] Swelling, redness, and/or bleeding at injection or IV site    [x] Fever or chills  [x] Rash or itching   [x] Shortness of breath  [x] Please review After Visit Summary (AVS) information on: Belatacept and kidney transplant    [x] Other: Next appt - May 15, 2024    Discharge Instructions for Belatacept    Please report any of these symptoms to your doctor right away:  Changes in mood or usual behavior  Confusion, problems thinking, loss of memory  Changes in walking or talking  Decreased strength or weakness on one side of body  Changes in vision  Fever, night sweats or tiredness that does not go away  Weight loss, swollen glands  Flu, cold symptoms or cough  Stomach are pain  Vomiting or diarrhea  Tenderness over your transplanted kidney  Change in the amount of urine that you make, blood in urine, pain or burning on urination.  A new skin lesion or bump, or change is size or color of a mole    Call the infusion Center, [phone] if you are sick or have any of the above symptoms several days before your next appointment.       Outpatient Infusion Center Information: Should you experience any significant changes in your health or have questions about your care please contact the [facility] Infusion Center at [phone] MONDAY - FRIDAY 8:00 am - 4:00 pm.  If you need help outside these hours and cannot wait until we are again available, contact your Primary Care Physician or go to the hospital emergency room.       Electronically signed by Miguelina

## 2024-05-13 ENCOUNTER — HOSPITAL ENCOUNTER (OUTPATIENT)
Dept: INFUSION THERAPY | Age: 44
Setting detail: INFUSION SERIES
Discharge: HOME | End: 2024-05-13
Payer: MEDICARE

## 2024-05-13 VITALS
HEART RATE: 76 BPM | HEIGHT: 63 IN | RESPIRATION RATE: 16 BRPM | WEIGHT: 160.27 LBS | OXYGEN SATURATION: 98 % | DIASTOLIC BLOOD PRESSURE: 76 MMHG | BODY MASS INDEX: 28.4 KG/M2 | SYSTOLIC BLOOD PRESSURE: 118 MMHG | TEMPERATURE: 98.2 F

## 2024-05-13 DIAGNOSIS — Z94.0 KIDNEY REPLACED BY TRANSPLANT: Primary | ICD-10-CM

## 2024-05-13 PROCEDURE — 6360000002 HC RX W HCPCS: Performed by: INTERNAL MEDICINE

## 2024-05-13 PROCEDURE — 96365 THER/PROPH/DIAG IV INF INIT: CPT

## 2024-05-13 PROCEDURE — 2580000003 HC RX 258: Performed by: INTERNAL MEDICINE

## 2024-05-13 RX ORDER — SODIUM CHLORIDE 0.9 % (FLUSH) 0.9 %
10 SYRINGE (ML) INJECTION PRN
Status: DISCONTINUED | OUTPATIENT
Start: 2024-05-13 | End: 2024-05-14 | Stop reason: HOSPADM

## 2024-05-13 RX ORDER — SODIUM CHLORIDE 0.9 % (FLUSH) 0.9 %
10 SYRINGE (ML) INJECTION PRN
Start: 2024-06-03

## 2024-05-13 RX ADMIN — SODIUM CHLORIDE, PRESERVATIVE FREE 10 ML: 5 INJECTION INTRAVENOUS at 09:30

## 2024-05-13 RX ADMIN — SODIUM CHLORIDE, PRESERVATIVE FREE 10 ML: 5 INJECTION INTRAVENOUS at 10:12

## 2024-05-13 RX ADMIN — DEXTROSE MONOHYDRATE 350 MG: 50 INJECTION, SOLUTION INTRAVENOUS at 09:36

## 2024-05-13 RX ADMIN — SODIUM CHLORIDE, PRESERVATIVE FREE 20 ML: 5 INJECTION INTRAVENOUS at 10:30

## 2024-05-13 NOTE — PROGRESS NOTES
Outpatient Infusion Center  [facility]    Belatacept Visit    NAME:  Fatuma Joy  YOB: 1980  MEDICAL RECORD NUMBER:  [id number]  DATE:  5/13/2024    Patient arrived to Outpatient Infusion Center   [] per wheelchair   [x] ambulatory     Alert and oriented x 4. Patient reports that since her last visit, she went on a cruise with her  and had a wonderful time. Patient states that she has only had a few episodes of back pain since her last visit. She reports that she is planning her \"excess skin removal\" surgery because insurance just approved it. Patient reports that she feels this will help with her back pain.  Patient denies side effects from previous Belatacept infusions and denies signs/symptoms of kidney rejection.       Diagnosis: kidney transplant on August 30, 2016    Patient Active Problem List   Diagnosis    Kidney replaced by transplant    Hypercalcemia    High serum parathyroid hormone (PTH)    Hypophosphatemia     Allergies   Allergen Reactions    Dilaudid [Hydromorphone Hcl]     Morphine     Vancomycin Itching    Zithromax [Azithromycin]     Dermabond Rash       Patient needs to be seropositive to EBV prior to Belatacept Infusion. Patient is EBV seropositive?  Yes    Has patient had TB skin test or Quantiferon Gold TB test recently?  No - per Dr Lord, patient does not need to have this done.    Has patient had any vaccines given recently?  No    Has patient been instructed to avoid any live vaccine administrations, such as but not limited to : intranasal influenza, measles, mumps, rubella, oral polio, BCG, yellow fever, varicella, and TY21 typhoid vaccines?  Yes - reviewed and patient verbalizes understanding    Avoid prolonged sun light, tanning beds or sun lights. Use sun screen. Immunosuppression can increase risk of skin cancers. - reviewed and patient verbalized understanding to wear sunscreen.    Patient is aware that Belatacept is an immunosuppressant

## 2024-05-13 NOTE — DISCHARGE INSTRUCTIONS
Outpatient Infusion Discharge Instructions  [facility]  [address] [place], [place] [address]  Telephone: [phone]      FAX [phone]    NAME:  Fatuma Joy  YOB: 1980  MEDICAL RECORD NUMBER:  [id number]  DATE:  May 13, 2024    Reason for Outpatient Infusion Visit: Belatacept Infusion    If you develop any these symptoms please contact you Doctor    [x] Nausea and/or vomiting not relieved with medication   [x] Swelling, redness, and/or bleeding at injection or IV site    [x] Fever or chills  [x] Rash or itching   [x] Shortness of breath  [x] Please review After Visit Summary (AVS) information on: Belatacept and kidney transplant    [x] Other: Next appt - June 19, 2024    Discharge Instructions for Belatacept    Please report any of these symptoms to your doctor right away:  Changes in mood or usual behavior  Confusion, problems thinking, loss of memory  Changes in walking or talking  Decreased strength or weakness on one side of body  Changes in vision  Fever, night sweats or tiredness that does not go away  Weight loss, swollen glands  Flu, cold symptoms or cough  Stomach are pain  Vomiting or diarrhea  Tenderness over your transplanted kidney  Change in the amount of urine that you make, blood in urine, pain or burning on urination.  A new skin lesion or bump, or change is size or color of a mole    Call the infusion Center, [phone] if you are sick or have any of the above symptoms several days before your next appointment.     Outpatient Infusion Center Information: Should you experience any significant changes in your health or have questions about your care please contact the [facility] Infusion Center at [phone] MONDAY - FRIDAY 8:00 am - 4:00 pm.  If you need help outside these hours and cannot wait until we are again available, contact your Primary Care Physician or go to the hospital emergency room.       Electronically signed by Miguelina REYES

## 2024-05-15 ENCOUNTER — APPOINTMENT (OUTPATIENT)
Dept: INFUSION THERAPY | Age: 44
End: 2024-05-15
Payer: MEDICARE

## 2024-05-23 NOTE — ADDENDUM NOTE
Encounter addended by: Harmeet Padgett [facility] on: 5/23/2024 9:05 AM   Actions taken: Order list changed, Therapy plan modified

## 2024-06-19 ENCOUNTER — HOSPITAL ENCOUNTER (OUTPATIENT)
Dept: INFUSION THERAPY | Age: 44
Setting detail: INFUSION SERIES
Discharge: HOME | End: 2024-06-19
Payer: MEDICARE

## 2024-06-19 VITALS
DIASTOLIC BLOOD PRESSURE: 83 MMHG | BODY MASS INDEX: 27.85 KG/M2 | TEMPERATURE: 98.3 F | SYSTOLIC BLOOD PRESSURE: 120 MMHG | RESPIRATION RATE: 16 BRPM | OXYGEN SATURATION: 97 % | HEART RATE: 82 BPM | HEIGHT: 63 IN | WEIGHT: 157.19 LBS

## 2024-06-19 DIAGNOSIS — Z94.0 KIDNEY REPLACED BY TRANSPLANT: Primary | ICD-10-CM

## 2024-06-19 PROCEDURE — 96365 THER/PROPH/DIAG IV INF INIT: CPT

## 2024-06-19 PROCEDURE — 6360000002 HC RX W HCPCS: Performed by: INTERNAL MEDICINE

## 2024-06-19 PROCEDURE — 2580000003 HC RX 258: Performed by: INTERNAL MEDICINE

## 2024-06-19 RX ORDER — SODIUM CHLORIDE 0.9 % (FLUSH) 0.9 %
10 SYRINGE (ML) INJECTION PRN
Start: 2024-07-10

## 2024-06-19 RX ORDER — SODIUM CHLORIDE 0.9 % (FLUSH) 0.9 %
10 SYRINGE (ML) INJECTION PRN
Status: DISCONTINUED | OUTPATIENT
Start: 2024-06-19 | End: 2024-06-20 | Stop reason: HOSPADM

## 2024-06-19 RX ADMIN — SODIUM CHLORIDE, PRESERVATIVE FREE 10 ML: 5 INJECTION INTRAVENOUS at 10:23

## 2024-06-19 RX ADMIN — SODIUM CHLORIDE, PRESERVATIVE FREE 10 ML: 5 INJECTION INTRAVENOUS at 11:46

## 2024-06-19 RX ADMIN — SODIUM CHLORIDE, PRESERVATIVE FREE 10 ML: 5 INJECTION INTRAVENOUS at 11:14

## 2024-06-19 RX ADMIN — SODIUM CHLORIDE, PRESERVATIVE FREE 20 ML: 5 INJECTION INTRAVENOUS at 12:02

## 2024-06-19 RX ADMIN — DEXTROSE MONOHYDRATE 350 MG: 50 INJECTION, SOLUTION INTRAVENOUS at 11:14

## 2024-06-19 NOTE — DISCHARGE INSTRUCTIONS
Outpatient Infusion Discharge Instructions  [facility]  [address] [place], [place] [address]  Telephone: [phone]      FAX [phone]    NAME:  Fatuma Joy  YOB: 1980  MEDICAL RECORD NUMBER:  [id number]  DATE:  June 19, 2024    Reason for Outpatient Infusion Visit: Belatacept infusion    If you develop any these symptoms please contact you Doctor    [x] Nausea and/or vomiting   [x] Swelling, redness, and/or bleeding at injection or IV site    [x] Fever or chills  [x] Rash or itching   [x] Shortness of breath  [x] Please review After Visit Summary (AVS) information on: Belatacept and kidney transplant   [x] Other: Next visit - July 24, 2024    Discharge Instructions for Belatacept    Please report any of these symptoms to your doctor right away:  Changes in mood or usual behavior  Confusion, problems thinking, loss of memory  Changes in walking or talking  Decreased strength or weakness on one side of body  Changes in vision  Fever, night sweats or tiredness that does not go away  Weight loss, swollen glands  Flu, cold symptoms or cough  Stomach are pain  Vomiting or diarrhea  Tenderness over your transplanted kidney  Change in the amount of urine that you make, blood in urine, pain or burning on urination.  A new skin lesion or bump, or change is size or color of a mole    Call the infusion Center, [phone] if you are sick or have any of the above symptoms several days before your next appointment.     Outpatient Infusion Center Information: Should you experience any significant changes in your health or have questions about your care please contact the [facility] Infusion Center at [phone] MONDAY - FRIDAY 8:00 am - 4:00 pm.  If you need help outside these hours and cannot wait until we are again available, contact your Primary Care Physician or go to the hospital emergency room.       Electronically signed by Miguelina Auguste RN on 6/19/2024 at

## 2024-06-19 NOTE — PROGRESS NOTES
Outpatient Infusion Center  [facility]    Belatacept Visit    NAME:  Fatuma Joy  YOB: 1980  MEDICAL RECORD NUMBER:  [id number]  DATE:  6/19/2024    Patient arrived to Outpatient Infusion Center   [] per wheelchair   [x] ambulatory     Alert and oriented x 4. Patient reports that she has been feeling well since her last visit. Reports that she had an \"ablation of the nerves in her back/SI joint\" on Nathalia 3, 2024. Patient states that she has not noticed much improvement in her back pain since having the procedure.   Denies side effects/adverse effects from previous Belatacept infusions.      Diagnosis: kidney transplant on August 30, 2016    Patient Active Problem List   Diagnosis    Kidney replaced by transplant    Hypercalcemia    High serum parathyroid hormone (PTH)    Hypophosphatemia     Allergies   Allergen Reactions    Dilaudid [Hydromorphone Hcl]     Morphine     Vancomycin Itching    Zithromax [Azithromycin]     Dermabond Rash       Patient needs to be seropositive to EBV prior to Belatacept Infusion. Patient is EBV seropositive?  Yes    Has patient had TB skin test or Quantiferon Gold TB test recently?  No - per Dr Lord, patient does not need to have this done.     Has patient had any vaccines given recently?  No    Has patient been instructed to avoid any live vaccine administrations, such as but not limited to : intranasal influenza, measles, mumps, rubella, oral polio, BCG, yellow fever, varicella, and TY21 typhoid vaccines?  Yes - reviewed with patient and she verbalized understanding    Avoid prolonged sun light, tanning beds or sun lights. Use sun screen. Immunosuppression can increase risk of skin cancers. Yes - reviewed with patient and she verbalized understanding and is aware to always wear sunscreen    Patient is aware that Belatacept is an immunosuppressant therapy and can increase risk of developing  infections and possibly other  Malignancies?  Yes     BP

## 2024-07-24 ENCOUNTER — HOSPITAL ENCOUNTER (OUTPATIENT)
Dept: INFUSION THERAPY | Age: 44
Setting detail: INFUSION SERIES
Discharge: HOME | End: 2024-07-24
Payer: MEDICARE

## 2024-07-24 VITALS
DIASTOLIC BLOOD PRESSURE: 84 MMHG | HEART RATE: 76 BPM | RESPIRATION RATE: 16 BRPM | TEMPERATURE: 97.9 F | SYSTOLIC BLOOD PRESSURE: 124 MMHG | WEIGHT: 158.51 LBS | BODY MASS INDEX: 28.09 KG/M2 | HEIGHT: 63 IN | OXYGEN SATURATION: 98 %

## 2024-07-24 DIAGNOSIS — Z94.0 KIDNEY REPLACED BY TRANSPLANT: Primary | ICD-10-CM

## 2024-07-24 PROCEDURE — 6360000002 HC RX W HCPCS: Performed by: INTERNAL MEDICINE

## 2024-07-24 PROCEDURE — 2580000003 HC RX 258: Performed by: INTERNAL MEDICINE

## 2024-07-24 PROCEDURE — 96365 THER/PROPH/DIAG IV INF INIT: CPT

## 2024-07-24 RX ORDER — SODIUM CHLORIDE 0.9 % (FLUSH) 0.9 %
10 SYRINGE (ML) INJECTION PRN
Status: DISCONTINUED | OUTPATIENT
Start: 2024-07-24 | End: 2024-07-25 | Stop reason: HOSPADM

## 2024-07-24 RX ORDER — SODIUM CHLORIDE 0.9 % (FLUSH) 0.9 %
10 SYRINGE (ML) INJECTION PRN
Start: 2024-08-14

## 2024-07-24 RX ADMIN — SODIUM CHLORIDE, PRESERVATIVE FREE 10 ML: 5 INJECTION INTRAVENOUS at 10:26

## 2024-07-24 RX ADMIN — SODIUM CHLORIDE, PRESERVATIVE FREE 10 ML: 5 INJECTION INTRAVENOUS at 11:32

## 2024-07-24 RX ADMIN — DEXTROSE MONOHYDRATE 350 MG: 50 INJECTION, SOLUTION INTRAVENOUS at 11:32

## 2024-07-24 RX ADMIN — SODIUM CHLORIDE, PRESERVATIVE FREE 10 ML: 5 INJECTION INTRAVENOUS at 12:07

## 2024-07-24 RX ADMIN — SODIUM CHLORIDE, PRESERVATIVE FREE 20 ML: 5 INJECTION INTRAVENOUS at 12:22

## 2024-07-24 NOTE — PROGRESS NOTES
Outpatient Infusion Center  [facility]    Belatacept Visit    NAME:  Fatuma Joy  YOB: 1980  MEDICAL RECORD NUMBER:  [id number]  DATE:  7/24/2024    Patient arrived to Outpatient Infusion Center   [] per wheelchair   [x] ambulatory       Alert and oriented x 4. Patient reports that she has been feeling well since her last visit. Reports that her  has been diagnosed with rectal cancer so she has been very busy and is very tired.   Patient denies side effects/adverse effects from previous Belatacept Infusions and denies signs/symptoms of kidney failure.    Diagnosis: kidney transplant on August 30, 2016    Patient Active Problem List   Diagnosis    Kidney replaced by transplant    Hypercalcemia    High serum parathyroid hormone (PTH)    Hypophosphatemia     Allergies   Allergen Reactions    Dilaudid [Hydromorphone Hcl]     Morphine     Vancomycin Itching    Zithromax [Azithromycin]     Dermabond Rash       Patient needs to be seropositive to EBV prior to Belatacept Infusion. Patient is EBV seropositive?  Yes    Has patient had TB skin test or Quantiferon Gold TB test recently?   No - per Dr Lord, patient does not need to have this done.     Has patient had any vaccines given recently?  No    Has patient been instructed to avoid any live vaccine administrations, such as but not limited to : intranasal influenza, measles, mumps, rubella, oral polio, BCG, yellow fever, varicella, and TY21 typhoid vaccines?  Yes - reviewed and patient verbalized understanding.    Avoid prolonged sun light, tanning beds or sun lights. Use sun screen. Immunosuppression can increase risk of skin cancers. - Reviewed and patient verbalizes understanding. Patient aware to use sunscreen when she is in her pool.    Patient is aware that Belatacept is an immunosuppressant therapy and can increase risk of developing  infections and possibly other  Malignancies?  Yes     /83   Pulse 78   Temp 98 °F

## 2024-07-24 NOTE — DISCHARGE INSTRUCTIONS
Outpatient Infusion Discharge Instructions  [facility]  [address] [place], [place] [address]  Telephone: [phone]      FAX [phone]    NAME:  Fatuma Joy  YOB: 1980  MEDICAL RECORD NUMBER:  [id number]  DATE:  July 24, 2024    Reason for Outpatient Infusion Visit: Belatacept infusion    If you develop any these symptoms please contact you Doctor    [] Nausea and/or vomiting not relieved with medication   [x] Swelling, redness, and/or bleeding at injection or IV site    [x] Fever or chills  [x] Rash or itching   [x] Shortness of breath  [x] Please review After Visit Summary (AVS) information on: Belatacept and kidney transplant    [x] Other: Next appt - August 28, 2024    Discharge Instructions for Belatacept    Please report any of these symptoms to your doctor right away:  Changes in mood or usual behavior  Confusion, problems thinking, loss of memory  Changes in walking or talking  Decreased strength or weakness on one side of body  Changes in vision  Fever, night sweats or tiredness that does not go away  Weight loss, swollen glands  Flu, cold symptoms or cough  Stomach are pain  Vomiting or diarrhea  Tenderness over your transplanted kidney  Change in the amount of urine that you make, blood in urine, pain or burning on urination.  A new skin lesion or bump, or change is size or color of a mole    Call the infusion Center, [phone] if you are sick or have any of the above symptoms several days before your next appointment.     Outpatient Infusion Center Information: Should you experience any significant changes in your health or have questions about your care please contact the [facility] Infusion Center at [phone] MONDAY - FRIDAY 8:00 am - 4:00 pm.  If you need help outside these hours and cannot wait until we are again available, contact your Primary Care Physician or go to the hospital emergency room.       Electronically signed by Miguelina

## 2024-08-01 NOTE — ADDENDUM NOTE
Encounter addended by: Gely Larson MA on: 8/1/2024 3:23 PM   Actions taken: Document created, Document edited

## 2024-08-29 ENCOUNTER — HOSPITAL ENCOUNTER (OUTPATIENT)
Dept: INFUSION THERAPY | Age: 44
Setting detail: INFUSION SERIES
Discharge: HOME | End: 2024-08-29
Payer: MEDICARE

## 2024-08-29 VITALS
HEIGHT: 63 IN | WEIGHT: 162.26 LBS | SYSTOLIC BLOOD PRESSURE: 99 MMHG | RESPIRATION RATE: 16 BRPM | BODY MASS INDEX: 28.75 KG/M2 | OXYGEN SATURATION: 100 % | HEART RATE: 90 BPM | DIASTOLIC BLOOD PRESSURE: 68 MMHG | TEMPERATURE: 97.5 F

## 2024-08-29 DIAGNOSIS — Z94.0 KIDNEY REPLACED BY TRANSPLANT: Primary | ICD-10-CM

## 2024-08-29 PROCEDURE — 6360000002 HC RX W HCPCS: Performed by: INTERNAL MEDICINE

## 2024-08-29 PROCEDURE — 2580000003 HC RX 258: Performed by: INTERNAL MEDICINE

## 2024-08-29 PROCEDURE — 96365 THER/PROPH/DIAG IV INF INIT: CPT

## 2024-08-29 RX ORDER — SODIUM CHLORIDE 0.9 % (FLUSH) 0.9 %
10 SYRINGE (ML) INJECTION PRN
Start: 2024-09-19

## 2024-08-29 RX ORDER — SODIUM CHLORIDE 0.9 % (FLUSH) 0.9 %
10 SYRINGE (ML) INJECTION PRN
Status: DISCONTINUED | OUTPATIENT
Start: 2024-08-29 | End: 2024-08-30 | Stop reason: HOSPADM

## 2024-08-29 RX ADMIN — DEXTROSE MONOHYDRATE 350 MG: 50 INJECTION, SOLUTION INTRAVENOUS at 08:54

## 2024-08-29 RX ADMIN — Medication 10 ML: at 08:37

## 2024-08-29 NOTE — DISCHARGE INSTRUCTIONS
Outpatient Infusion Discharge Instructions  [facility]  [address] [place], [place] [address]  Telephone: [phone]      FAX [phone]    NAME:  Fatuma Joy  YOB: 1980  MEDICAL RECORD NUMBER:  [id number]  DATE:  @ED@    Reason for Outpatient Infusion Visit: Belatacept    If you develop any these symptoms please contact you Doctor    [x] Nausea and/or vomiting not relieved with medication   [x] Swelling, redness, and/or bleeding at injection or IV site    [x] Fever or chills  [x] Rash or itching   [x] Shortness of breath  [x] Please review After Visit Summary (AVS) information on    [] Other      Outpatient Infusion Center Information: Should you experience any significant changes in your health or have questions about your care please contact the [facility] at [phone] MONDAY - FRIDAY 8:00 am - 4:00 pm.  If you need help outside these hours and cannot wait until we are again available, contact your Primary Care Physician or go to the hospital emergency room.       Electronically signed by Swapna Antoine RN on 8/29/2024 at 8:58 AM

## 2024-08-29 NOTE — PROGRESS NOTES
Outpatient Infusion Center  [facility]    Belatacept Visit    NAME:  Fatuma Joy  YOB: 1980  MEDICAL RECORD NUMBER:  [id number]  DATE:  8/29/2024    Patient arrived to Outpatient Infusion Center   [] per wheelchair   [x] ambulatory     Patient alert and oriented x4.      Diagnosis:   Kidney transplant August 26, 2016    Patient Active Problem List   Diagnosis    Kidney replaced by transplant    Hypercalcemia    High serum parathyroid hormone (PTH)    Hypophosphatemia     Allergies   Allergen Reactions    Dilaudid [Hydromorphone Hcl]     Morphine     Vancomycin Itching    Zithromax [Azithromycin]     Dermabond Rash       Patient needs to be seropositive to EBV prior to Belatacept Infusion. Patient is EBV seropositive?  Yes    Has patient had TB skin test or Quantiferon Gold TB test recently?  No    Has patient had any vaccines given recently?  No    Has patient been instructed to avoid any live vaccine administrations, such as but not limited to : intranasal influenza, measles, mumps, rubella, oral polio, BCG, yellow fever, varicella, and TY21 typhoid vaccines?  Yes    Avoid prolonged sun light, tanning beds or sun lights. Use sun screen. Immunosuppression can increase risk of skin cancers.    Patient is aware that Belatacept is an immunosuppressant therapy and can increase risk of developing  infections and possibly other  Malignancies?  Yes     BP 99/68   Pulse 90   Temp 97.5 °F (36.4 °C) (Oral)   Resp 16   Ht 1.6 m (5' 3\")   Wt 73.6 kg (162 lb 4.1 oz)   SpO2 100%   BMI 28.74 kg/m²     Pre Infusion Checklist       Patient given a copy of the medication guide:  Yes       Patient counseled about increased risk for:    Post-Transplant Lymphoproliferative Disorder (PTLD), predominantly involving the  CNS:  Yes                                                  Increased risk of Progressive Multifocal Leukoencephalopathy (PML), a CNS Infection:  Yes

## 2024-10-03 ENCOUNTER — HOSPITAL ENCOUNTER (OUTPATIENT)
Dept: INFUSION THERAPY | Age: 44
Setting detail: INFUSION SERIES
End: 2024-10-03
Payer: COMMERCIAL

## 2024-10-04 ENCOUNTER — HOSPITAL ENCOUNTER (OUTPATIENT)
Dept: INFUSION THERAPY | Age: 44
Setting detail: INFUSION SERIES
Discharge: HOME | End: 2024-10-04
Payer: COMMERCIAL

## 2024-10-04 VITALS
OXYGEN SATURATION: 100 % | SYSTOLIC BLOOD PRESSURE: 123 MMHG | WEIGHT: 157 LBS | BODY MASS INDEX: 27.81 KG/M2 | DIASTOLIC BLOOD PRESSURE: 77 MMHG | HEART RATE: 96 BPM | RESPIRATION RATE: 16 BRPM

## 2024-10-04 DIAGNOSIS — Z94.0 KIDNEY REPLACED BY TRANSPLANT: Primary | ICD-10-CM

## 2024-10-04 PROCEDURE — 6360000002 HC RX W HCPCS: Performed by: INTERNAL MEDICINE

## 2024-10-04 PROCEDURE — 2580000003 HC RX 258: Performed by: INTERNAL MEDICINE

## 2024-10-04 PROCEDURE — 96365 THER/PROPH/DIAG IV INF INIT: CPT

## 2024-10-04 RX ORDER — SODIUM CHLORIDE 0.9 % (FLUSH) 0.9 %
10 SYRINGE (ML) INJECTION PRN
Status: DISCONTINUED | OUTPATIENT
Start: 2024-10-04 | End: 2024-10-05 | Stop reason: HOSPADM

## 2024-10-04 RX ORDER — SODIUM CHLORIDE 0.9 % (FLUSH) 0.9 %
10 SYRINGE (ML) INJECTION PRN
Start: 2024-10-25

## 2024-10-04 RX ADMIN — Medication 10 ML: at 10:04

## 2024-10-04 RX ADMIN — DEXTROSE MONOHYDRATE 350 MG: 50 INJECTION, SOLUTION INTRAVENOUS at 10:04

## 2024-10-04 NOTE — PROGRESS NOTES
Outpatient Infusion Center  [facility]    Belatacept Visit    NAME:  Fatuma Joy  YOB: 1980  MEDICAL RECORD NUMBER:  [id number]  DATE:  10/4/2024    Patient arrived to Outpatient Infusion Center   [] per wheelchair   [x] ambulatory       Diagnosis: kidney  transplant    Patient Active Problem List   Diagnosis    Kidney replaced by transplant    Hypercalcemia    High serum parathyroid hormone (PTH)    Hypophosphatemia     Allergies   Allergen Reactions    Dilaudid [Hydromorphone Hcl]     Morphine     Vancomycin Itching    Zithromax [Azithromycin]     Dermabond Rash       Patient needs to be seropositive to EBV prior to Belatacept Infusion. Patient is EBV seropositive?  Yes    Has patient had TB skin test or Quantiferon Gold TB test recently?  No    Has patient had any vaccines given recently?  No    Has patient been instructed to avoid any live vaccine administrations, such as but not limited to : intranasal influenza, measles, mumps, rubella, oral polio, BCG, yellow fever, varicella, and TY21 typhoid vaccines?  Yes    Avoid prolonged sun light, tanning beds or sun lights. Use sun screen. Immunosuppression can increase risk of skin cancers.    Patient is aware that Belatacept is an immunosuppressant therapy and can increase risk of developing  infections and possibly other  Malignancies?  Yes     Wt 71.2 kg (157 lb)   BMI 27.81 kg/m²     Pre Infusion Checklist       Patient given a copy of the medication guide:  Yes       Patient counseled about increased risk for:    Post-Transplant Lymphoproliferative Disorder (PTLD), predominantly involving the  CNS:  Yes                                                  Increased risk of Progressive Multifocal Leukoencephalopathy (PML), a CNS Infection:  Yes                                                                                                   Over the past month, have you had any new or worsening medical problems such as a new or

## 2024-11-07 ENCOUNTER — HOSPITAL ENCOUNTER (OUTPATIENT)
Dept: INFUSION THERAPY | Age: 44
Setting detail: INFUSION SERIES
Discharge: HOME | End: 2024-11-07
Payer: MEDICARE

## 2024-11-07 VITALS
WEIGHT: 155 LBS | SYSTOLIC BLOOD PRESSURE: 120 MMHG | HEART RATE: 88 BPM | BODY MASS INDEX: 27.46 KG/M2 | TEMPERATURE: 97.3 F | RESPIRATION RATE: 16 BRPM | DIASTOLIC BLOOD PRESSURE: 85 MMHG

## 2024-11-07 DIAGNOSIS — Z94.0 KIDNEY REPLACED BY TRANSPLANT: Primary | ICD-10-CM

## 2024-11-07 PROCEDURE — 2580000003 HC RX 258: Performed by: INTERNAL MEDICINE

## 2024-11-07 PROCEDURE — 96365 THER/PROPH/DIAG IV INF INIT: CPT

## 2024-11-07 PROCEDURE — 6360000002 HC RX W HCPCS: Performed by: INTERNAL MEDICINE

## 2024-11-07 RX ORDER — SODIUM CHLORIDE 0.9 % (FLUSH) 0.9 %
10 SYRINGE (ML) INJECTION PRN
Start: 2024-11-28

## 2024-11-07 RX ORDER — SODIUM CHLORIDE 0.9 % (FLUSH) 0.9 %
10 SYRINGE (ML) INJECTION PRN
Status: DISCONTINUED | OUTPATIENT
Start: 2024-11-07 | End: 2024-11-08 | Stop reason: HOSPADM

## 2024-11-07 RX ADMIN — SODIUM CHLORIDE, PRESERVATIVE FREE 10 ML: 5 INJECTION INTRAVENOUS at 08:42

## 2024-11-07 RX ADMIN — DEXTROSE MONOHYDRATE 350 MG: 50 INJECTION, SOLUTION INTRAVENOUS at 08:42

## 2024-11-07 NOTE — PROGRESS NOTES
Outpatient Infusion Center  [facility]    Belatacept Visit    NAME:  Fatuma Joy  YOB: 1980  MEDICAL RECORD NUMBER:  [id number]  DATE:  11/7/2024    Patient arrived to Outpatient Infusion Center   [] per wheelchair   [] ambulatory       Diagnosis: kidney transplant    Patient Active Problem List   Diagnosis    Kidney replaced by transplant    Hypercalcemia    High serum parathyroid hormone (PTH)    Hypophosphatemia     Allergies   Allergen Reactions    Dilaudid [Hydromorphone Hcl]     Morphine     Vancomycin Itching    Zithromax [Azithromycin]     Dermabond Rash       Patient needs to be seropositive to EBV prior to Belatacept Infusion. Patient is EBV seropositive?  Yes    Has patient had TB skin test or Quantiferon Gold TB test recently?  No    Has patient had any vaccines given recently?  No    Has patient been instructed to avoid any live vaccine administrations, such as but not limited to : intranasal influenza, measles, mumps, rubella, oral polio, BCG, yellow fever, varicella, and TY21 typhoid vaccines?  Yes    Avoid prolonged sun light, tanning beds or sun lights. Use sun screen. Immunosuppression can increase risk of skin cancers.    Patient is aware that Belatacept is an immunosuppressant therapy and can increase risk of developing  infections and possibly other  Malignancies?  {yes no:241535}     /85   Pulse 88   Temp 97.3 °F (36.3 °C) (Oral)   Resp 16   Wt 71.2 kg (157 lb)   BMI 27.81 kg/m²     Pre Infusion Checklist       Patient given a copy of the medication guide:  Yes       Patient counseled about increased risk for:    Post-Transplant Lymphoproliferative Disorder (PTLD), predominantly involving the  CNS:  Yes                                                  Increased risk of Progressive Multifocal Leukoencephalopathy (PML), a CNS Infection:  Yes                                                                                                   Over the past  month, have you had any new or worsening medical problems such as a new or sudden change in your thinking, memory, speech, mood, behavior, vision, balance, strength, or other problems?  No                         Over the past month, have you had any new or worsening symptoms such as fever, night sweats, tiredness that does not go away, weight loss or swollen glands?  No                   Patient counseled to report any of these symptoms to physician right away.    Patient given Belatacept 350 mg IVPB over 30 min IVPB using a .22 micron filter per protocol.    Response to treatment:  Well tolerated by patient.    Education:    Indicates understanding       Electronically signed by Sherrill Torres RN on 11/7/2024 at 8:55 AM

## 2024-12-11 ENCOUNTER — HOSPITAL ENCOUNTER (OUTPATIENT)
Dept: INFUSION THERAPY | Age: 44
Setting detail: INFUSION SERIES
Discharge: HOME | End: 2024-12-11
Payer: MEDICARE

## 2024-12-11 VITALS
HEART RATE: 84 BPM | WEIGHT: 157.85 LBS | HEIGHT: 63 IN | BODY MASS INDEX: 27.97 KG/M2 | DIASTOLIC BLOOD PRESSURE: 73 MMHG | RESPIRATION RATE: 16 BRPM | SYSTOLIC BLOOD PRESSURE: 118 MMHG | OXYGEN SATURATION: 96 % | TEMPERATURE: 98.2 F

## 2024-12-11 DIAGNOSIS — Z94.0 KIDNEY REPLACED BY TRANSPLANT: Primary | ICD-10-CM

## 2024-12-11 PROCEDURE — 6360000002 HC RX W HCPCS: Performed by: INTERNAL MEDICINE

## 2024-12-11 PROCEDURE — 96365 THER/PROPH/DIAG IV INF INIT: CPT

## 2024-12-11 PROCEDURE — 2580000003 HC RX 258: Performed by: INTERNAL MEDICINE

## 2024-12-11 RX ORDER — OLOPATADINE HYDROCHLORIDE 2 MG/ML
1 SOLUTION/ DROPS OPHTHALMIC PRN
COMMUNITY

## 2024-12-11 RX ORDER — SODIUM CHLORIDE 0.9 % (FLUSH) 0.9 %
10 SYRINGE (ML) INJECTION PRN
Status: DISCONTINUED | OUTPATIENT
Start: 2024-12-11 | End: 2024-12-12 | Stop reason: HOSPADM

## 2024-12-11 RX ORDER — ZOLMITRIPTAN 5 MG/1
5 TABLET, FILM COATED ORAL
COMMUNITY

## 2024-12-11 RX ORDER — SODIUM CHLORIDE 0.9 % (FLUSH) 0.9 %
10 SYRINGE (ML) INJECTION PRN
Start: 2025-01-01

## 2024-12-11 RX ADMIN — SODIUM CHLORIDE, PRESERVATIVE FREE 20 ML: 5 INJECTION INTRAVENOUS at 11:22

## 2024-12-11 RX ADMIN — SODIUM CHLORIDE, PRESERVATIVE FREE 10 ML: 5 INJECTION INTRAVENOUS at 11:09

## 2024-12-11 RX ADMIN — SODIUM CHLORIDE, PRESERVATIVE FREE 10 ML: 5 INJECTION INTRAVENOUS at 10:32

## 2024-12-11 RX ADMIN — DEXTROSE MONOHYDRATE 350 MG: 50 INJECTION, SOLUTION INTRAVENOUS at 10:32

## 2024-12-11 RX ADMIN — SODIUM CHLORIDE, PRESERVATIVE FREE 10 ML: 5 INJECTION INTRAVENOUS at 10:00

## 2024-12-11 NOTE — PROGRESS NOTES
Outpatient Infusion Center  [facility]    Belatacept Visit    NAME:  Fatuma Joy  YOB: 1980  MEDICAL RECORD NUMBER:  [id number]  DATE:  12/11/2024    Patient arrived to Outpatient Infusion Center   [] per wheelchair   [x] ambulatory     Alert and oriented x 4. Patient reports that she has been feeling well since her last visit. Reports that her  continues  with treatment for rectal cancer so she has been very busy and is very tired.   Patient denies side effects/adverse effects from previous Belatacept Infusions and denies signs/symptoms of kidney failure.    Diagnosis: Kidney transplant on August 30, 2016    Patient Active Problem List   Diagnosis    Kidney replaced by transplant    Hypercalcemia    High serum parathyroid hormone (PTH)    Hypophosphatemia     Allergies   Allergen Reactions    Dilaudid [Hydromorphone Hcl]     Morphine     Vancomycin Itching    Zithromax [Azithromycin]     Dermabond Rash       Patient needs to be seropositive to EBV prior to Belatacept Infusion. Patient is EBV seropositive?  Yes    Has patient had TB skin test or Quantiferon Gold TB test recently?  No - per Dr Lord, patient does not need to have this done.     Has patient had any vaccines given recently?  No    Has patient been instructed to avoid any live vaccine administrations, such as but not limited to : intranasal influenza, measles, mumps, rubella, oral polio, BCG, yellow fever, varicella, and TY21 typhoid vaccines?  Yes - reviewed and patient verbalized understanding    Avoid prolonged sun light, tanning beds or sun lights. Use sun screen. Immunosuppression can increase risk of skin cancers.    Patient is aware that Belatacept is an immunosuppressant therapy and can increase risk of developing  infections and possibly other  Malignancies?  Yes     /73   Pulse 84   Temp 98.2 °F (36.8 °C) (Oral)   Resp 16   Ht 1.6 m (5' 3\")   Wt 71.6 kg (157 lb 13.6 oz)   SpO2 96%   BMI

## 2024-12-11 NOTE — DISCHARGE INSTRUCTIONS
Outpatient Infusion Discharge Instructions  [facility]  [address] [place], [place] [address]  Telephone: [phone]      FAX [phone]    NAME:  Fatuma Joy  YOB: 1980  MEDICAL RECORD NUMBER:  [id number]  DATE:  December 11, 2024    Reason for Outpatient Infusion Visit: Belatacept Infusion    If you develop any these symptoms please contact you Doctor    [] Nausea and/or vomiting not relieved with medication   [x] Swelling, redness, and/or bleeding at injection or IV site    [x] Fever or chills  [x] Rash or itching   [x] Shortness of breath  [x] Please review After Visit Summary (AVS) information on: Belatacept and kidney transplant    [x] Other: Next visit January 15, 2025    Discharge Instructions for Belatacept    Please report any of these symptoms to your doctor right away:  Changes in mood or usual behavior  Confusion, problems thinking, loss of memory  Changes in walking or talking  Decreased strength or weakness on one side of body  Changes in vision  Fever, night sweats or tiredness that does not go away  Weight loss, swollen glands  Flu, cold symptoms or cough  Stomach are pain  Vomiting or diarrhea  Tenderness over your transplanted kidney  Change in the amount of urine that you make, blood in urine, pain or burning on urination.  A new skin lesion or bump, or change is size or color of a mole    Call the infusion Center, [phone] if you are sick or have any of the above symptoms several days before your next appointment.     Outpatient Infusion Center Information: Should you experience any significant changes in your health or have questions about your care please contact the [facility] Infusion Center at [phone] MONDAY - FRIDAY 8:00 am - 4:00 pm.  If you need help outside these hours and cannot wait until we are again available, contact your Primary Care Physician or go to the hospital emergency room.       Electronically signed by

## 2025-01-15 ENCOUNTER — HOSPITAL ENCOUNTER (OUTPATIENT)
Dept: INFUSION THERAPY | Age: 45
Setting detail: INFUSION SERIES
Discharge: HOME | End: 2025-01-15
Payer: MEDICARE

## 2025-01-15 VITALS
BODY MASS INDEX: 28 KG/M2 | HEART RATE: 89 BPM | RESPIRATION RATE: 16 BRPM | WEIGHT: 158 LBS | SYSTOLIC BLOOD PRESSURE: 115 MMHG | OXYGEN SATURATION: 99 % | DIASTOLIC BLOOD PRESSURE: 78 MMHG | TEMPERATURE: 98.3 F | HEIGHT: 63 IN

## 2025-01-15 DIAGNOSIS — Z94.0 KIDNEY REPLACED BY TRANSPLANT: Primary | ICD-10-CM

## 2025-01-15 LAB
CHOLEST SERPL-MCNC: 204 MG/DL (ref 0–199)
HDLC SERPL-MCNC: 60 MG/DL (ref 40–60)
LDLC SERPL CALC-MCNC: 116 MG/DL
TRIGL SERPL-MCNC: 139 MG/DL (ref 0–150)
VLDLC SERPL CALC-MCNC: 28 MG/DL

## 2025-01-15 PROCEDURE — 96365 THER/PROPH/DIAG IV INF INIT: CPT

## 2025-01-15 PROCEDURE — 6360000002 HC RX W HCPCS: Performed by: INTERNAL MEDICINE

## 2025-01-15 PROCEDURE — 80061 LIPID PANEL: CPT

## 2025-01-15 PROCEDURE — 2580000003 HC RX 258: Performed by: INTERNAL MEDICINE

## 2025-01-15 PROCEDURE — 2500000003 HC RX 250 WO HCPCS: Performed by: INTERNAL MEDICINE

## 2025-01-15 RX ORDER — ESTRADIOL 0.5 MG/.5G
1 GEL TOPICAL DAILY
COMMUNITY

## 2025-01-15 RX ORDER — SODIUM CHLORIDE 0.9 % (FLUSH) 0.9 %
10 SYRINGE (ML) INJECTION PRN
Status: DISCONTINUED | OUTPATIENT
Start: 2025-01-15 | End: 2025-01-16 | Stop reason: HOSPADM

## 2025-01-15 RX ORDER — SODIUM CHLORIDE 0.9 % (FLUSH) 0.9 %
10 SYRINGE (ML) INJECTION PRN
Start: 2025-02-05

## 2025-01-15 RX ADMIN — DEXTROSE MONOHYDRATE 350 MG: 50 INJECTION, SOLUTION INTRAVENOUS at 10:59

## 2025-01-15 RX ADMIN — SODIUM CHLORIDE, PRESERVATIVE FREE 10 ML: 5 INJECTION INTRAVENOUS at 11:34

## 2025-01-15 RX ADMIN — SODIUM CHLORIDE, PRESERVATIVE FREE 10 ML: 5 INJECTION INTRAVENOUS at 11:46

## 2025-01-15 RX ADMIN — SODIUM CHLORIDE, PRESERVATIVE FREE 10 ML: 5 INJECTION INTRAVENOUS at 10:25

## 2025-01-15 RX ADMIN — SODIUM CHLORIDE, PRESERVATIVE FREE 10 ML: 5 INJECTION INTRAVENOUS at 10:58

## 2025-01-15 NOTE — DISCHARGE INSTRUCTIONS
Outpatient Infusion Discharge Instructions  [facility]  [address] [place], [place] [address]  Telephone: [phone]      FAX [phone]    NAME:  Fatuma Joy  YOB: 1980  MEDICAL RECORD NUMBER:  [id number]  DATE:  January 15, 2025    Reason for Outpatient Infusion Visit: Belatacept Infusion    If you develop any these symptoms please contact you Doctor    [] Nausea and/or vomiting not relieved with medication   [x] Swelling, redness, and/or bleeding at injection or IV site    [x] Fever or chills  [x] Rash or itching   [x] Shortness of breath  [x] Please review After Visit Summary (AVS) information on: Belatacept, Kidney transplant and hyperlipidemia    [x] Other: Next visit - February 19, 2025    Discharge Instructions for Belatacept    Please report any of these symptoms to your doctor right away:  Changes in mood or usual behavior  Confusion, problems thinking, loss of memory  Changes in walking or talking  Decreased strength or weakness on one side of body  Changes in vision  Fever, night sweats or tiredness that does not go away  Weight loss, swollen glands  Flu, cold symptoms or cough  Stomach are pain  Vomiting or diarrhea  Tenderness over your transplanted kidney  Change in the amount of urine that you make, blood in urine, pain or burning on urination.  A new skin lesion or bump, or change is size or color of a mole    Call the infusion Center, [phone] if you are sick or have any of the above symptoms several days before your next appointment.     Outpatient Infusion Center Information: Should you experience any significant changes in your health or have questions about your care please contact the [facility] Infusion Center at [phone] MONDAY - FRIDAY 8:00 am - 4:00 pm.  If you need help outside these hours and cannot wait until we are again available, contact your Primary Care Physician or go to the hospital emergency room.

## 2025-01-15 NOTE — PROGRESS NOTES
Outpatient Infusion Center  [facility]    Belatacept Visit    NAME:  Fatuma Joy  YOB: 1980  MEDICAL RECORD NUMBER:  [id number]  DATE:  1/15/2025    Patient arrived to Outpatient Infusion Center   [] per wheelchair   [x] ambulatory       Alert and oriented x 4. Patient reports that she has been feeling well since her last visit. States that she had a nerve ablation in her back about 2 weeks ago which has not significantly improved her intermittent back pain. Patient reports that she was told it could take up to 4 weeks to feel improvement. Patient reports that currently she is not having any pain because she was able to rest well last night. Patient also reports that her  has completed his chemotherapy treatments and will be re-scanned on January 27, 2025. Patient reports that this has helped her to feel a little better as well.   Patient denies side effects/adverse effects from previous Belatacept infusions.     Diagnosis: Kidney transplant on August 30, 2016    Patient Active Problem List   Diagnosis    Kidney replaced by transplant    Hypercalcemia    High serum parathyroid hormone (PTH)    Hypophosphatemia     Allergies   Allergen Reactions    Dilaudid [Hydromorphone Hcl]     Morphine     Vancomycin Itching    Zithromax [Azithromycin]     Dermabond Rash       Patient needs to be seropositive to EBV prior to Belatacept Infusion. Patient is EBV seropositive?  Yes    Has patient had TB skin test or Quantiferon Gold TB test recently? No - per Dr Lord, patient does not need to have further TB testing done.      Has patient had any vaccines given recently?  No    Has patient been instructed to avoid any live vaccine administrations, such as but not limited to : intranasal influenza, measles, mumps, rubella, oral polio, BCG, yellow fever, varicella, and TY21 typhoid vaccines?  Yes - reviewed and patient verbalized understanding    Avoid prolonged sun light, tanning beds or  sun lights. Use sun screen. Immunosuppression can increase risk of skin cancers. - reviewed and patient verbalized understanding    Patient is aware that Belatacept is an immunosuppressant therapy and can increase risk of developing  infections and possibly other  Malignancies?  Yes     /78   Pulse 89   Temp 98.3 °F (36.8 °C) (Oral)   Resp 16   Ht 1.6 m (5' 3\")   Wt 71.7 kg (158 lb)   SpO2 99%   BMI 27.99 kg/m²     Pre Infusion Checklist       Patient given a copy of the medication guide:  Yes - given at previous visit and via AVS today       Patient counseled about increased risk for:    Post-Transplant Lymphoproliferative Disorder (PTLD), predominantly involving the  CNS:  Yes                                                  Increased risk of Progressive Multifocal Leukoencephalopathy (PML), a CNS Infection:  Yes                                                                                                  Over the past month, have you had any new or worsening medical problems such as a new or sudden change in your thinking, memory, speech, mood, behavior, vision, balance, strength, or other problems?  No - patient denies these symptoms                        Over the past month, have you had any new or worsening symptoms such as fever, night sweats, tiredness that does not go away, weight loss or swollen glands?  No - patient denies these symptoms.                   Patient counseled to report any of these symptoms to physician right away.    Patient given Belatacept 350 mg IVPB over 30 min IVPB using a .22 micron filter per protocol.    Response to treatment:  Well tolerated by patient.    Education: Verbal and written discharge instructions reviewed with patient. Verbalized understanding. Written copy given via AVS     Follow up: Patient to return for next Belatacept infusion on Febrary 19, 2025     Electronically signed by Miguelina Auguste RN on 1/15/2025 at 3:42 PM

## 2025-02-19 ENCOUNTER — HOSPITAL ENCOUNTER (OUTPATIENT)
Dept: INFUSION THERAPY | Age: 45
Setting detail: INFUSION SERIES
Discharge: HOME | End: 2025-02-19
Payer: MEDICARE

## 2025-02-19 VITALS
TEMPERATURE: 98.1 F | DIASTOLIC BLOOD PRESSURE: 80 MMHG | BODY MASS INDEX: 28.59 KG/M2 | RESPIRATION RATE: 16 BRPM | WEIGHT: 161.38 LBS | SYSTOLIC BLOOD PRESSURE: 119 MMHG | HEIGHT: 63 IN | OXYGEN SATURATION: 99 % | HEART RATE: 80 BPM

## 2025-02-19 DIAGNOSIS — Z94.0 KIDNEY REPLACED BY TRANSPLANT: Primary | ICD-10-CM

## 2025-02-19 PROCEDURE — 96365 THER/PROPH/DIAG IV INF INIT: CPT

## 2025-02-19 PROCEDURE — 6360000002 HC RX W HCPCS: Performed by: INTERNAL MEDICINE

## 2025-02-19 PROCEDURE — 2500000003 HC RX 250 WO HCPCS: Performed by: INTERNAL MEDICINE

## 2025-02-19 PROCEDURE — 2580000003 HC RX 258: Performed by: INTERNAL MEDICINE

## 2025-02-19 RX ORDER — ERENUMAB-AOOE 140 MG/ML
140 INJECTION, SOLUTION SUBCUTANEOUS
COMMUNITY

## 2025-02-19 RX ORDER — SODIUM CHLORIDE 0.9 % (FLUSH) 0.9 %
10 SYRINGE (ML) INJECTION PRN
Status: DISCONTINUED | OUTPATIENT
Start: 2025-02-19 | End: 2025-02-20 | Stop reason: HOSPADM

## 2025-02-19 RX ORDER — CANDESARTAN 4 MG/1
4 TABLET ORAL DAILY
COMMUNITY

## 2025-02-19 RX ORDER — SODIUM CHLORIDE 0.9 % (FLUSH) 0.9 %
10 SYRINGE (ML) INJECTION PRN
Start: 2025-03-12

## 2025-02-19 RX ADMIN — DEXTROSE MONOHYDRATE 350 MG: 50 INJECTION, SOLUTION INTRAVENOUS at 12:16

## 2025-02-19 RX ADMIN — SODIUM CHLORIDE, PRESERVATIVE FREE 10 ML: 5 INJECTION INTRAVENOUS at 12:50

## 2025-02-19 RX ADMIN — SODIUM CHLORIDE, PRESERVATIVE FREE 10 ML: 5 INJECTION INTRAVENOUS at 12:12

## 2025-02-19 RX ADMIN — SODIUM CHLORIDE, PRESERVATIVE FREE 20 ML: 5 INJECTION INTRAVENOUS at 13:06

## 2025-02-19 NOTE — PROGRESS NOTES
any live vaccine administrations, such as but not limited to : intranasal influenza, measles, mumps, rubella, oral polio, BCG, yellow fever, varicella, and TY21 typhoid vaccines?  Yes - reviewed and patient verbalizes understanding     Avoid prolonged sun light, tanning beds or sun lights. Use sun screen. Immunosuppression can increase risk of skin cancers. - reviewed and patient verbalizes understanding.     Patient is aware that Belatacept is an immunosuppressant therapy and can increase risk of developing  infections and possibly other  Malignancies?  Yes     /80   Pulse 80   Temp 98.1 °F (36.7 °C) (Oral)   Resp 16   Wt 72.1 kg (159 lb)   SpO2 99%   BMI 28.17 kg/m²     Pre Infusion Checklist       Patient given a copy of the medication guide:  Yes - at previous visit and via AVS today       Patient counseled about increased risk for:    Post-Transplant Lymphoproliferative Disorder (PTLD), predominantly involving the  CNS:  Yes                                                  Increased risk of Progressive Multifocal Leukoencephalopathy (PML), a CNS Infection:  Yes                                                                                                  Over the past month, have you had any new or worsening medical problems such as a new or sudden change in your thinking, memory, speech, mood, behavior, vision, balance, strength, or other problems?  No - patient denies these symptoms                       Over the past month, have you had any new or worsening symptoms such as fever, night sweats, tiredness that does not go away, weight loss or swollen glands?  No - patient denies these symptoms                   Patient counseled to report any of these symptoms to physician right away.    Patient given Belatacept 350 mg IVPB over 30 min IVPB using a .22 micron filter per protocol.    Response to treatment:  Well tolerated by patient.    Education: Verbal and written discharge instructions  reviewed with patient. Verbalized understanding. Written copy given via AVS      Follow up: Patient to return for next Belatacept infusion on March 26, 2025     Electronically signed by Miguelina Auguste RN on 2/19/2025 at 3:11 PM

## 2025-02-19 NOTE — DISCHARGE INSTRUCTIONS
Outpatient Infusion Discharge Instructions  [facility]  [address] [place], [place] [address]  Telephone: [phone]      FAX [phone]    NAME:  Fatuma Joy  YOB: 1980  MEDICAL RECORD NUMBER:  [id number]  DATE:  February 19, 2025    Reason for Outpatient Infusion Visit: Belatacept Infusion    If you develop any these symptoms please contact you Doctor    [] Nausea and/or vomiting not relieved with medication   [x] Swelling, redness, and/or bleeding at injection or IV site    [x] Fever or chills  [x] Rash or itching   [x] Shortness of breath  [x] Please review After Visit Summary (AVS) information on: Belatacept and kidney transplant    [x] Other: Next appt - March 26, 2025    Discharge Instructions for Belatacept    Please report any of these symptoms to your doctor right away:  Changes in mood or usual behavior  Confusion, problems thinking, loss of memory  Changes in walking or talking  Decreased strength or weakness on one side of body  Changes in vision  Fever, night sweats or tiredness that does not go away  Weight loss, swollen glands  Flu, cold symptoms or cough  Stomach are pain  Vomiting or diarrhea  Tenderness over your transplanted kidney  Change in the amount of urine that you make, blood in urine, pain or burning on urination.  A new skin lesion or bump, or change is size or color of a mole    Call the infusion Center, [phone] if you are sick or have any of the above symptoms several days before your next appointment.   Outpatient Infusion Center Information: Should you experience any significant changes in your health or have questions about your care please contact the [facility] Infusion Center at [phone] MONDAY - FRIDAY 8:00 am - 4:00 pm.  If you need help outside these hours and cannot wait until we are again available, contact your Primary Care Physician or go to the hospital emergency room.       Electronically signed by Miguelina  AMY Auguste, RN on 2/19/2025 at 12:59 PM

## 2025-03-26 ENCOUNTER — HOSPITAL ENCOUNTER (OUTPATIENT)
Dept: INFUSION THERAPY | Age: 45
Setting detail: INFUSION SERIES
Discharge: HOME | End: 2025-03-26
Payer: MEDICARE

## 2025-03-26 VITALS
WEIGHT: 155 LBS | SYSTOLIC BLOOD PRESSURE: 113 MMHG | HEART RATE: 78 BPM | TEMPERATURE: 97.9 F | OXYGEN SATURATION: 100 % | BODY MASS INDEX: 27.46 KG/M2 | HEIGHT: 63 IN | RESPIRATION RATE: 16 BRPM | DIASTOLIC BLOOD PRESSURE: 73 MMHG

## 2025-03-26 DIAGNOSIS — Z94.0 KIDNEY REPLACED BY TRANSPLANT: Primary | ICD-10-CM

## 2025-03-26 PROCEDURE — 6360000002 HC RX W HCPCS: Performed by: INTERNAL MEDICINE

## 2025-03-26 PROCEDURE — 96365 THER/PROPH/DIAG IV INF INIT: CPT

## 2025-03-26 PROCEDURE — 2580000003 HC RX 258: Performed by: INTERNAL MEDICINE

## 2025-03-26 PROCEDURE — 2500000003 HC RX 250 WO HCPCS: Performed by: INTERNAL MEDICINE

## 2025-03-26 RX ORDER — SODIUM CHLORIDE 0.9 % (FLUSH) 0.9 %
10 SYRINGE (ML) INJECTION PRN
Status: DISCONTINUED | OUTPATIENT
Start: 2025-03-26 | End: 2025-03-27 | Stop reason: HOSPADM

## 2025-03-26 RX ORDER — SODIUM CHLORIDE 0.9 % (FLUSH) 0.9 %
10 SYRINGE (ML) INJECTION PRN
Start: 2025-04-16

## 2025-03-26 RX ADMIN — DEXTROSE MONOHYDRATE 350 MG: 50 INJECTION, SOLUTION INTRAVENOUS at 10:01

## 2025-03-26 RX ADMIN — SODIUM CHLORIDE, PRESERVATIVE FREE 10 ML: 5 INJECTION INTRAVENOUS at 10:40

## 2025-03-26 NOTE — PROGRESS NOTES
Outpatient Infusion Center  [facility]    Belatacept Visit    NAME:  Fatuma Joy  YOB: 1980  MEDICAL RECORD NUMBER:  [id number]  DATE:  3/26/2025    Patient arrived to Outpatient Infusion Center   [] per wheelchair   [x] ambulatory       Diagnosis: Kidney transplant 2016    Patient Active Problem List   Diagnosis    Kidney replaced by transplant    Hypercalcemia    High serum parathyroid hormone (PTH)    Hypophosphatemia     Allergies   Allergen Reactions    Dilaudid [Hydromorphone Hcl]     Morphine     Vancomycin Itching    Zithromax [Azithromycin]     Dermabond Rash       Patient needs to be seropositive to EBV prior to Belatacept Infusion. Patient is EBV seropositive?  Yes    Has patient had TB skin test or Quantiferon Gold TB test recently?  No/ Dr. Lord stated patient does not need further testing.    Has patient had any vaccines given recently?  No    Has patient been instructed to avoid any live vaccine administrations, such as but not limited to : intranasal influenza, measles, mumps, rubella, oral polio, BCG, yellow fever, varicella, and TY21 typhoid vaccines?  Yes    Avoid prolonged sun light, tanning beds or sun lights. Use sun screen. Immunosuppression can increase risk of skin cancers.    Patient is aware that Belatacept is an immunosuppressant therapy and can increase risk of developing  infections and possibly other  Malignancies?  Yes     /73   Pulse 78   Temp 97.9 °F (36.6 °C) (Oral)   Resp 16   Ht 1.6 m (5' 3\")   Wt 70.3 kg (155 lb)   SpO2 100%   BMI 27.46 kg/m²     Pre Infusion Checklist       Patient given a copy of the medication guide:  Yes       Patient counseled about increased risk for:    Post-Transplant Lymphoproliferative Disorder (PTLD), predominantly involving the  CNS:  Yes                                                  Increased risk of Progressive Multifocal Leukoencephalopathy (PML), a CNS Infection:  Yes

## 2025-03-26 NOTE — DISCHARGE INSTRUCTIONS
Outpatient Infusion Discharge Instructions  [facility]  [address] [place], [place] [address]  Telephone: [phone]      FAX [phone]    NAME:  Fatuma Joy  YOB: 1980  MEDICAL RECORD NUMBER:  [id number]  DATE:  @ED@    Reason for Outpatient Infusion Visit: Belatacept    If you develop any these symptoms please contact you Doctor    [x] Nausea and/or vomiting not relieved with medication   [x] Swelling, redness, and/or bleeding at injection or IV site    [x] Fever or chills  [x] Rash or itching   [x] Shortness of breath  [x] Please review After Visit Summary (AVS) information on    [x] Other / Next appointment Wednesday April 30 10 am      Outpatient Infusion Center Information: Should you experience any significant changes in your health or have questions about your care please contact the [facility] at [phone] MONDAY - FRIDAY 8:00 am - 4:00 pm.  If you need help outside these hours and cannot wait until we are again available, contact your Primary Care Physician or go to the hospital emergency room.       Electronically signed by Swapna Antoine RN on 3/26/2025 at 10:13 AM

## 2025-04-30 ENCOUNTER — HOSPITAL ENCOUNTER (OUTPATIENT)
Dept: INFUSION THERAPY | Age: 45
Setting detail: INFUSION SERIES
Discharge: HOME | End: 2025-04-30
Payer: MEDICARE

## 2025-04-30 VITALS
BODY MASS INDEX: 27.28 KG/M2 | DIASTOLIC BLOOD PRESSURE: 74 MMHG | WEIGHT: 154 LBS | RESPIRATION RATE: 16 BRPM | HEART RATE: 67 BPM | TEMPERATURE: 98.1 F | SYSTOLIC BLOOD PRESSURE: 112 MMHG | OXYGEN SATURATION: 99 %

## 2025-04-30 DIAGNOSIS — Z94.0 KIDNEY REPLACED BY TRANSPLANT: Primary | ICD-10-CM

## 2025-04-30 PROCEDURE — 2580000003 HC RX 258: Performed by: INTERNAL MEDICINE

## 2025-04-30 PROCEDURE — 6360000002 HC RX W HCPCS: Performed by: INTERNAL MEDICINE

## 2025-04-30 PROCEDURE — 96365 THER/PROPH/DIAG IV INF INIT: CPT

## 2025-04-30 PROCEDURE — 2500000003 HC RX 250 WO HCPCS: Performed by: INTERNAL MEDICINE

## 2025-04-30 RX ORDER — FLUTICASONE PROPIONATE 93 UG/1
1 SPRAY, METERED NASAL 2 TIMES DAILY
COMMUNITY

## 2025-04-30 RX ORDER — SODIUM CHLORIDE 0.9 % (FLUSH) 0.9 %
10 SYRINGE (ML) INJECTION PRN
Status: DISCONTINUED | OUTPATIENT
Start: 2025-04-30 | End: 2025-05-01 | Stop reason: HOSPADM

## 2025-04-30 RX ORDER — SODIUM CHLORIDE 0.9 % (FLUSH) 0.9 %
10 SYRINGE (ML) INJECTION PRN
Start: 2025-05-21

## 2025-04-30 RX ORDER — GABAPENTIN 300 MG/1
300 CAPSULE ORAL NIGHTLY
COMMUNITY
Start: 2025-03-31

## 2025-04-30 RX ADMIN — SODIUM CHLORIDE, PRESERVATIVE FREE 10 ML: 5 INJECTION INTRAVENOUS at 10:28

## 2025-04-30 RX ADMIN — DEXTROSE MONOHYDRATE 350 MG: 50 INJECTION, SOLUTION INTRAVENOUS at 10:34

## 2025-04-30 RX ADMIN — SODIUM CHLORIDE, PRESERVATIVE FREE 20 ML: 5 INJECTION INTRAVENOUS at 11:10

## 2025-04-30 NOTE — PROGRESS NOTES
Outpatient Infusion Center  [facility]    Belatacept Visit    NAME:  Fatuma Joy  YOB: 1980  MEDICAL RECORD NUMBER:  [id number]  DATE:  4/30/2025    Patient arrived to Outpatient Infusion Center   [] per wheelchair   [x] ambulatory       Alert and oriented x 4. Patient reports that she is feeling well and has been feeling well since her last appointment.   Patient denies side effects/adverse effects from previous Belatacept infusions    Diagnosis: Kidney replaced by transplant August 30, 2016    Patient Active Problem List   Diagnosis    Kidney replaced by transplant    Hypercalcemia    High serum parathyroid hormone (PTH)    Hypophosphatemia     Allergies   Allergen Reactions    Dilaudid [Hydromorphone Hcl]     Morphine     Vancomycin Itching    Zithromax [Azithromycin]     Dermabond Rash       Patient needs to be seropositive to EBV prior to Belatacept Infusion. Patient is EBV seropositive?  Yes    Has patient had TB skin test or Quantiferon Gold TB test recently?  No - per Dr Lord, patient does not need to have further TB testing done.     Has patient had any vaccines given recently?  No    Has patient been instructed to avoid any live vaccine administrations, such as but not limited to : intranasal influenza, measles, mumps, rubella, oral polio, BCG, yellow fever, varicella, and TY21 typhoid vaccines?  Yes - reviewed and patient verbalizes understanding    Avoid prolonged sun light, tanning beds or sun lights. Use sun screen. Immunosuppression can increase risk of skin cancers. - Reviewed with patient and he verbalizes understanding    Patient is aware that Belatacept is an immunosuppressant therapy and can increase risk of developing  infections and possibly other  Malignancies? - Yes     /79   Pulse 62   Temp 98.2 °F (36.8 °C) (Oral)   Resp 16   Wt 69.9 kg (154 lb)   SpO2 100%   BMI 27.28 kg/m²     Pre Infusion Checklist       Patient given a copy of the  medication guide:  Yes - given at previous visit. Patient declined written copy of AVS today       Patient counseled about increased risk for:    Post-Transplant Lymphoproliferative Disorder (PTLD), predominantly involving the  CNS:  Yes                                                  Increased risk of Progressive Multifocal Leukoencephalopathy (PML), a CNS Infection:  Yes                                                                                                  Over the past month, have you had any new or worsening medical problems such as a new or sudden change in your thinking, memory, speech, mood, behavior, vision, balance, strength, or other problems?  No - patient denies these symptoms but reports that in the last week, she has had difficulty \"coming up with her words\". Patient states she thinks this is due to being overwhelmed with her 's health concerns and concerns about her teenage daughter.                       Over the past month, have you had any new or worsening symptoms such as fever, night sweats, tiredness that does not go away, weight loss or swollen glands?  No - patient denies these symptoms.                   Patient counseled to report any of these symptoms to physician right away. - Reviewed and patient verbalizes understanding    Patient given Belatacept 350 mg IVPB over 30 min IVPB using a .22 micron filter per protocol.    Response to treatment:  Well tolerated by patient.    Education: Verbal discharge instructions reviewed with patient. Verbalized understanding. Patient declined written copy of AVS      Electronically signed by Miguelina Auguste RN on 4/30/2025 at 12:27 PM

## 2025-04-30 NOTE — DISCHARGE INSTRUCTIONS
Outpatient Infusion Discharge Instructions  [facility]  [address] [place], [place] [address]  Telephone: [phone]      FAX [phone]    NAME:  Fatuma Joy  YOB: 1980  MEDICAL RECORD NUMBER:  [id number]  DATE:  April 30 , 2025    Reason for Outpatient Infusion Visit: Belatacept infusion    If you develop any these symptoms please contact you Doctor    [] Nausea and/or vomiting not relieved with medication   [x] Swelling, redness, and/or bleeding at injection or IV site    [x] Fever or chills  [x] Rash or itching   [x] Shortness of breath  [x] Please review After Visit Summary (AVS) information on: Belatacept and kidney transplant    [x] Other: Next visit - June 4, 2025    Discharge Instructions for Belatacept    Please report any of these symptoms to your doctor right away:  Changes in mood or usual behavior  Confusion, problems thinking, loss of memory  Changes in walking or talking  Decreased strength or weakness on one side of body  Changes in vision  Fever, night sweats or tiredness that does not go away  Weight loss, swollen glands  Flu, cold symptoms or cough  Stomach are pain  Vomiting or diarrhea  Tenderness over your transplanted kidney  Change in the amount of urine that you make, blood in urine, pain or burning on urination.  A new skin lesion or bump, or change is size or color of a mole    Call the infusion Center, [phone] if you are sick or have any of the above symptoms several days before your next appointment.     Outpatient Infusion Center Information: Should you experience any significant changes in your health or have questions about your care please contact the [facility] Infusion Center at [phone] MONDAY - FRIDAY 8:00 am - 4:00 pm.  If you need help outside these hours and cannot wait until we are again available, contact your Primary Care Physician or go to the hospital emergency room.       Electronically signed by Miguelina

## 2025-06-04 ENCOUNTER — HOSPITAL ENCOUNTER (OUTPATIENT)
Dept: INFUSION THERAPY | Age: 45
Setting detail: INFUSION SERIES
Discharge: HOME | End: 2025-06-04
Payer: MEDICARE

## 2025-06-04 VITALS
TEMPERATURE: 98.1 F | OXYGEN SATURATION: 100 % | HEIGHT: 63 IN | DIASTOLIC BLOOD PRESSURE: 60 MMHG | HEART RATE: 59 BPM | WEIGHT: 150 LBS | BODY MASS INDEX: 26.58 KG/M2 | RESPIRATION RATE: 16 BRPM | SYSTOLIC BLOOD PRESSURE: 98 MMHG

## 2025-06-04 DIAGNOSIS — Z94.0 KIDNEY REPLACED BY TRANSPLANT: Primary | ICD-10-CM

## 2025-06-04 PROCEDURE — 6360000002 HC RX W HCPCS: Performed by: INTERNAL MEDICINE

## 2025-06-04 PROCEDURE — 96365 THER/PROPH/DIAG IV INF INIT: CPT

## 2025-06-04 PROCEDURE — 2580000003 HC RX 258: Performed by: INTERNAL MEDICINE

## 2025-06-04 PROCEDURE — 2500000003 HC RX 250 WO HCPCS: Performed by: INTERNAL MEDICINE

## 2025-06-04 RX ORDER — SODIUM CHLORIDE 0.9 % (FLUSH) 0.9 %
10 SYRINGE (ML) INJECTION PRN
Status: DISCONTINUED | OUTPATIENT
Start: 2025-06-04 | End: 2025-06-05 | Stop reason: HOSPADM

## 2025-06-04 RX ORDER — SODIUM CHLORIDE 0.9 % (FLUSH) 0.9 %
10 SYRINGE (ML) INJECTION PRN
Start: 2025-06-25

## 2025-06-04 RX ADMIN — Medication 10 ML: at 10:45

## 2025-06-04 RX ADMIN — DEXTROSE MONOHYDRATE 350 MG: 50 INJECTION, SOLUTION INTRAVENOUS at 10:05

## 2025-06-04 NOTE — DISCHARGE INSTRUCTIONS
Outpatient Infusion Discharge Instructions  [facility]  [address] [place], [place] [address]  Telephone: [phone]      FAX [phone]    NAME:  Fatuma Joy  YOB: 1980  MEDICAL RECORD NUMBER:  [id number]  DATE:  @ED@    Reason for Outpatient Infusion Visit: Belatacept    If you develop any these symptoms please contact you Doctor    [x] Nausea and/or vomiting not relieved with medication   [x] Swelling, redness, and/or bleeding at injection or IV site    [x] Fever or chills  [x] Rash or itching   [x] Shortness of breath  [x] Please review After Visit Summary (AVS) information on    [] Other      Outpatient Infusion Center Information: Should you experience any significant changes in your health or have questions about your care please contact the [facility] at [phone] MONDAY - FRIDAY 8:00 am - 4:00 pm.  If you need help outside these hours and cannot wait until we are again available, contact your Primary Care Physician or go to the hospital emergency room.       Electronically signed by Swapna Antoine RN on 6/4/2025 at 10:23 AM

## 2025-06-04 NOTE — PROGRESS NOTES
Outpatient Infusion Center  [facility]    Belatacept Visit    NAME:  Fatuma Joy  YOB: 1980  MEDICAL RECORD NUMBER:  [id number]  DATE:  6/4/2025    Patient arrived to Outpatient Infusion Center   [] per wheelchair   [x] ambulatory     Patient alert and oriented x4.       Diagnosis: Kidney transplant 2016    Patient Active Problem List   Diagnosis    Kidney replaced by transplant    Hypercalcemia    High serum parathyroid hormone (PTH)    Hypophosphatemia     Allergies   Allergen Reactions    Dilaudid [Hydromorphone Hcl]     Morphine     Vancomycin Itching    Zithromax [Azithromycin]     Dermabond Rash       Patient needs to be seropositive to EBV prior to Belatacept Infusion. Patient is EBV seropositive?  Yes    Has patient had TB skin test or Quantiferon Gold TB test recently?  No/ not needed per Dr. Lord    Has patient had any vaccines given recently?  No    Has patient been instructed to avoid any live vaccine administrations, such as but not limited to : intranasal influenza, measles, mumps, rubella, oral polio, BCG, yellow fever, varicella, and TY21 typhoid vaccines?  Yes    Avoid prolonged sun light, tanning beds or sun lights. Use sun screen. Immunosuppression can increase risk of skin cancers.    Patient is aware that Belatacept is an immunosuppressant therapy and can increase risk of developing  infections and possibly other  Malignancies?  Yes     BP 98/60   Pulse 59   Temp 98.1 °F (36.7 °C) (Oral)   Resp 16   Ht 1.6 m (5' 3\")   Wt 68 kg (150 lb)   SpO2 100%   BMI 26.57 kg/m²     Pre Infusion Checklist       Patient given a copy of the medication guide:  Yes       Patient counseled about increased risk for:    Post-Transplant Lymphoproliferative Disorder (PTLD), predominantly involving the  CNS:  Yes                                                  Increased risk of Progressive Multifocal Leukoencephalopathy (PML), a CNS Infection:  Yes                                                                                                    Over the past month, have you had any new or worsening medical problems such as a new or sudden change in your thinking, memory, speech, mood, behavior, vision, balance, strength, or other problems?  No                         Over the past month, have you had any new or worsening symptoms such as fever, night sweats, tiredness that does not go away, weight loss or swollen glands?  No                   Patient counseled to report any of these symptoms to physician right away.    Patient given Belatacept 350 mg IVPB over 30 min IVPB using a .22 micron filter per protocol.    Response to treatment:  Well tolerated by patient.    Education:    Verbalized understanding       Electronically signed by Swapna Antoine RN on 6/4/2025 at 10:04 AM

## 2025-07-08 ENCOUNTER — HOSPITAL ENCOUNTER (OUTPATIENT)
Dept: INFUSION THERAPY | Age: 45
Setting detail: INFUSION SERIES
Discharge: HOME | End: 2025-07-08
Payer: MEDICARE

## 2025-07-08 VITALS
WEIGHT: 148.15 LBS | SYSTOLIC BLOOD PRESSURE: 123 MMHG | TEMPERATURE: 98.2 F | DIASTOLIC BLOOD PRESSURE: 88 MMHG | OXYGEN SATURATION: 100 % | HEART RATE: 69 BPM | RESPIRATION RATE: 16 BRPM | BODY MASS INDEX: 26.24 KG/M2

## 2025-07-08 DIAGNOSIS — Z94.0 KIDNEY REPLACED BY TRANSPLANT: Primary | ICD-10-CM

## 2025-07-08 PROCEDURE — 6360000002 HC RX W HCPCS: Performed by: INTERNAL MEDICINE

## 2025-07-08 PROCEDURE — 96365 THER/PROPH/DIAG IV INF INIT: CPT

## 2025-07-08 PROCEDURE — 2580000003 HC RX 258: Performed by: INTERNAL MEDICINE

## 2025-07-08 PROCEDURE — 2500000003 HC RX 250 WO HCPCS: Performed by: INTERNAL MEDICINE

## 2025-07-08 RX ORDER — SODIUM CHLORIDE 0.9 % (FLUSH) 0.9 %
10 SYRINGE (ML) INJECTION PRN
Status: DISCONTINUED | OUTPATIENT
Start: 2025-07-08 | End: 2025-07-09 | Stop reason: HOSPADM

## 2025-07-08 RX ORDER — SODIUM CHLORIDE 0.9 % (FLUSH) 0.9 %
10 SYRINGE (ML) INJECTION PRN
Start: 2025-07-29

## 2025-07-08 RX ADMIN — SODIUM CHLORIDE, PRESERVATIVE FREE 10 ML: 5 INJECTION INTRAVENOUS at 10:21

## 2025-07-08 RX ADMIN — DEXTROSE 350 MG: 50 INJECTION, SOLUTION INTRAVENOUS at 10:25

## 2025-07-08 RX ADMIN — SODIUM CHLORIDE, PRESERVATIVE FREE 10 ML: 5 INJECTION INTRAVENOUS at 11:01

## 2025-07-08 ASSESSMENT — PAIN SCALES - GENERAL: PAINLEVEL_OUTOF10: 0

## 2025-07-08 NOTE — PROGRESS NOTES
Outpatient Infusion Center  [facility]    Belatacept Visit    NAME:  Fatuma Joy  YOB: 1980  MEDICAL RECORD NUMBER:  [id number]  DATE:  7/8/2025    Patient arrived to Outpatient Infusion Center   [] per wheelchair   [x] ambulatory       Diagnosis: Kidney Transplant    Patient Active Problem List   Diagnosis    Kidney replaced by transplant    Hypercalcemia    High serum parathyroid hormone (PTH)    Hypophosphatemia     Allergies   Allergen Reactions    Dilaudid [Hydromorphone Hcl]     Morphine     Vancomycin Itching    Zithromax [Azithromycin]     Dermabond Rash       Patient needs to be seropositive to EBV prior to Belatacept Infusion. Patient is EBV seropositive?  Yes    Has patient had TB skin test or Quantiferon Gold TB test recently?  No    Has patient had any vaccines given recently?  No    Has patient been instructed to avoid any live vaccine administrations, such as but not limited to : intranasal influenza, measles, mumps, rubella, oral polio, BCG, yellow fever, varicella, and TY21 typhoid vaccines?  Yes    Avoid prolonged sun light, tanning beds or sun lights. Use sun screen. Immunosuppression can increase risk of skin cancers.    Patient is aware that Belatacept is an immunosuppressant therapy and can increase risk of developing  infections and possibly other  Malignancies?  Yes     /88   Pulse 69   Temp 98.2 °F (36.8 °C) (Oral)   Resp 16   Wt 67.2 kg (148 lb 2.4 oz)   SpO2 100%   BMI 26.24 kg/m²     Pre Infusion Checklist       Patient given a copy of the medication guide:  Yes       Patient counseled about increased risk for:    Post-Transplant Lymphoproliferative Disorder (PTLD), predominantly involving the  CNS:  Yes                                                  Increased risk of Progressive Multifocal Leukoencephalopathy (PML), a CNS Infection:  Yes                                                                                                   Over  the past month, have you had any new or worsening medical problems such as a new or sudden change in your thinking, memory, speech, mood, behavior, vision, balance, strength, or other problems?  No                         Over the past month, have you had any new or worsening symptoms such as fever, night sweats, tiredness that does not go away, weight loss or swollen glands?  No                   Patient counseled to report any of these symptoms to physician right away.    Patient given Belatacept 350 mg IVPB over 30 min IVPB using a .22 micron filter per protocol.    Response to treatment:  Well tolerated by patient.    Education:    Verbalized understanding       Electronically signed by Vera Huerta RN on 7/8/2025 at 11:05 AM

## 2025-07-08 NOTE — DISCHARGE INSTRUCTIONS
tells you briefly how to take your medicine, but it does not tell you all there is to know about it. Your doctor or pharmacist may give you other documents about your medicine. Please talk to them if you have any questions. Always follow their advice.  There is a more complete description of this medicine available in English. Scan this code on your smartphone or tablet or use the web address below. You can also ask your pharmacist for a printout. If you have any questions, please ask your pharmacist.  The display and use of this drug information is subject to Terms of Use.  More information about BELATACEPT - INJECTION      Copyright(c) 2023 [employer]  Selected from data included with permission and copyright by [employer]. This copyrighted material has been downloaded from a licensed data provider and is not for distribution, except as may be authorized by the applicable terms of use.  Conditions of Use: The information in this database is intended to supplement, not substitute for the expertise and judgment of healthcare professionals. The information is not intended to cover all possible uses, directions, precautions, drug interactions or adverse effects nor should it be construed to indicate that use of a particular drug is safe, appropriate or effective for you or anyone else. A healthcare professional should be consulted before taking any drug, changing any diet or commencing or discontinuing any course of treatment. The display and use of this drug information is subject to express Terms of Use.  Care instructions adapted under license by [employer]. If you have questions about a medical condition or this instruction, always ask your healthcare professional. [employer] disclaims any warranty or liability for your use of this information. Please review After Visit Summary (AVS) information on    [] Other      Outpatient Infusion Center Information: Should you experience any

## 2025-08-12 ENCOUNTER — HOSPITAL ENCOUNTER (OUTPATIENT)
Dept: INFUSION THERAPY | Age: 45
Setting detail: INFUSION SERIES
Discharge: HOME | End: 2025-08-12
Payer: MEDICARE

## 2025-08-12 VITALS
HEART RATE: 73 BPM | SYSTOLIC BLOOD PRESSURE: 100 MMHG | WEIGHT: 144 LBS | DIASTOLIC BLOOD PRESSURE: 65 MMHG | RESPIRATION RATE: 16 BRPM | TEMPERATURE: 98.2 F | BODY MASS INDEX: 25.51 KG/M2

## 2025-08-12 DIAGNOSIS — Z94.0 KIDNEY REPLACED BY TRANSPLANT: Primary | ICD-10-CM

## 2025-08-12 PROCEDURE — 6360000002 HC RX W HCPCS: Performed by: INTERNAL MEDICINE

## 2025-08-12 PROCEDURE — 2500000003 HC RX 250 WO HCPCS: Performed by: INTERNAL MEDICINE

## 2025-08-12 PROCEDURE — 2580000003 HC RX 258: Performed by: INTERNAL MEDICINE

## 2025-08-12 PROCEDURE — 96365 THER/PROPH/DIAG IV INF INIT: CPT

## 2025-08-12 RX ORDER — SODIUM CHLORIDE 0.9 % (FLUSH) 0.9 %
10 SYRINGE (ML) INJECTION PRN
Start: 2025-09-02

## 2025-08-12 RX ORDER — SODIUM CHLORIDE 0.9 % (FLUSH) 0.9 %
10 SYRINGE (ML) INJECTION PRN
Status: DISCONTINUED | OUTPATIENT
Start: 2025-08-12 | End: 2025-08-13 | Stop reason: HOSPADM

## 2025-08-12 RX ADMIN — SODIUM CHLORIDE, PRESERVATIVE FREE 10 ML: 5 INJECTION INTRAVENOUS at 09:55

## 2025-08-12 RX ADMIN — SODIUM CHLORIDE, PRESERVATIVE FREE 10 ML: 5 INJECTION INTRAVENOUS at 09:13

## 2025-08-12 RX ADMIN — DEXTROSE MONOHYDRATE 350 MG: 50 INJECTION, SOLUTION INTRAVENOUS at 09:14

## 2025-08-13 ENCOUNTER — TELEPHONE (OUTPATIENT)
Dept: INFUSION THERAPY | Age: 45
End: 2025-08-13